# Patient Record
Sex: MALE | Race: WHITE | HISPANIC OR LATINO
[De-identification: names, ages, dates, MRNs, and addresses within clinical notes are randomized per-mention and may not be internally consistent; named-entity substitution may affect disease eponyms.]

---

## 2017-02-17 ENCOUNTER — APPOINTMENT (OUTPATIENT)
Dept: NEUROLOGY | Facility: CLINIC | Age: 82
End: 2017-02-17

## 2017-02-17 VITALS
WEIGHT: 192 LBS | SYSTOLIC BLOOD PRESSURE: 112 MMHG | TEMPERATURE: 97.6 F | HEIGHT: 66 IN | OXYGEN SATURATION: 98 % | HEART RATE: 52 BPM | BODY MASS INDEX: 30.86 KG/M2 | DIASTOLIC BLOOD PRESSURE: 76 MMHG

## 2017-04-18 ENCOUNTER — RX RENEWAL (OUTPATIENT)
Age: 82
End: 2017-04-18

## 2017-06-30 ENCOUNTER — APPOINTMENT (OUTPATIENT)
Dept: NEUROLOGY | Facility: CLINIC | Age: 82
End: 2017-06-30

## 2017-06-30 VITALS
HEIGHT: 66 IN | BODY MASS INDEX: 31.5 KG/M2 | SYSTOLIC BLOOD PRESSURE: 100 MMHG | HEART RATE: 70 BPM | DIASTOLIC BLOOD PRESSURE: 76 MMHG | WEIGHT: 196 LBS | TEMPERATURE: 97.3 F

## 2017-06-30 RX ORDER — CHOLECALCIFEROL (VITAMIN D3) 1250 MCG
1.25 MG CAPSULE ORAL
Qty: 12 | Refills: 0 | Status: ACTIVE | COMMUNITY
Start: 2016-12-29

## 2017-07-18 ENCOUNTER — RX RENEWAL (OUTPATIENT)
Age: 82
End: 2017-07-18

## 2017-09-06 ENCOUNTER — OUTPATIENT (OUTPATIENT)
Dept: OUTPATIENT SERVICES | Facility: HOSPITAL | Age: 82
LOS: 1 days | End: 2017-09-06

## 2017-09-06 ENCOUNTER — APPOINTMENT (OUTPATIENT)
Dept: MRI IMAGING | Facility: HOSPITAL | Age: 82
End: 2017-09-06

## 2017-09-06 DIAGNOSIS — M54.5 LOW BACK PAIN: ICD-10-CM

## 2017-09-08 ENCOUNTER — APPOINTMENT (OUTPATIENT)
Dept: NEUROLOGY | Facility: CLINIC | Age: 82
End: 2017-09-08
Payer: MEDICARE

## 2017-09-08 PROCEDURE — 95886 MUSC TEST DONE W/N TEST COMP: CPT

## 2017-09-08 PROCEDURE — 95911 NRV CNDJ TEST 9-10 STUDIES: CPT

## 2017-09-11 ENCOUNTER — OTHER (OUTPATIENT)
Age: 82
End: 2017-09-11

## 2017-09-16 ENCOUNTER — OUTPATIENT (OUTPATIENT)
Dept: OUTPATIENT SERVICES | Facility: HOSPITAL | Age: 82
LOS: 1 days | End: 2017-09-16
Payer: MEDICARE

## 2017-09-16 ENCOUNTER — APPOINTMENT (OUTPATIENT)
Dept: MRI IMAGING | Facility: HOSPITAL | Age: 82
End: 2017-09-16
Payer: MEDICARE

## 2017-09-16 DIAGNOSIS — M54.9 DORSALGIA, UNSPECIFIED: ICD-10-CM

## 2017-09-16 PROCEDURE — 72148 MRI LUMBAR SPINE W/O DYE: CPT | Mod: 26

## 2017-09-16 PROCEDURE — 72148 MRI LUMBAR SPINE W/O DYE: CPT

## 2017-10-25 ENCOUNTER — APPOINTMENT (OUTPATIENT)
Dept: NEUROLOGY | Facility: CLINIC | Age: 82
End: 2017-10-25
Payer: MEDICARE

## 2017-10-25 VITALS
DIASTOLIC BLOOD PRESSURE: 86 MMHG | WEIGHT: 185 LBS | TEMPERATURE: 98.6 F | HEIGHT: 66 IN | HEART RATE: 64 BPM | BODY MASS INDEX: 29.73 KG/M2 | SYSTOLIC BLOOD PRESSURE: 132 MMHG

## 2017-10-25 DIAGNOSIS — M54.5 LOW BACK PAIN: ICD-10-CM

## 2017-10-25 DIAGNOSIS — R20.2 ANESTHESIA OF SKIN: ICD-10-CM

## 2017-10-25 DIAGNOSIS — R20.0 ANESTHESIA OF SKIN: ICD-10-CM

## 2017-10-25 PROCEDURE — 99214 OFFICE O/P EST MOD 30 MIN: CPT

## 2017-10-30 ENCOUNTER — APPOINTMENT (OUTPATIENT)
Dept: NEUROLOGY | Facility: CLINIC | Age: 82
End: 2017-10-30
Payer: MEDICARE

## 2017-10-30 PROCEDURE — 95912 NRV CNDJ TEST 11-12 STUDIES: CPT

## 2017-10-30 PROCEDURE — 95885 MUSC TST DONE W/NERV TST LIM: CPT

## 2018-01-09 ENCOUNTER — LABORATORY RESULT (OUTPATIENT)
Age: 83
End: 2018-01-09

## 2018-01-09 ENCOUNTER — APPOINTMENT (OUTPATIENT)
Dept: INTERNAL MEDICINE | Facility: CLINIC | Age: 83
End: 2018-01-09
Payer: MEDICARE

## 2018-01-09 VITALS
SYSTOLIC BLOOD PRESSURE: 130 MMHG | WEIGHT: 191 LBS | HEART RATE: 59 BPM | OXYGEN SATURATION: 98 % | TEMPERATURE: 97.4 F | RESPIRATION RATE: 16 BRPM | BODY MASS INDEX: 30.7 KG/M2 | DIASTOLIC BLOOD PRESSURE: 80 MMHG | HEIGHT: 66 IN

## 2018-01-09 PROCEDURE — 90686 IIV4 VACC NO PRSV 0.5 ML IM: CPT

## 2018-01-09 PROCEDURE — 90670 PCV13 VACCINE IM: CPT

## 2018-01-09 PROCEDURE — G0009: CPT

## 2018-01-09 PROCEDURE — 99213 OFFICE O/P EST LOW 20 MIN: CPT | Mod: 25

## 2018-01-09 PROCEDURE — G0008: CPT

## 2018-01-10 LAB
25(OH)D3 SERPL-MCNC: 48.6 NG/ML
ALBUMIN SERPL ELPH-MCNC: 4.3 G/DL
ALP BLD-CCNC: 75 U/L
ALT SERPL-CCNC: 8 U/L
ANION GAP SERPL CALC-SCNC: 17 MMOL/L
APPEARANCE: CLEAR
AST SERPL-CCNC: 15 U/L
BASOPHILS # BLD AUTO: 0.08 K/UL
BASOPHILS NFR BLD AUTO: 0.9 %
BILIRUB SERPL-MCNC: 0.6 MG/DL
BILIRUBIN URINE: NEGATIVE
BLOOD URINE: ABNORMAL
BUN SERPL-MCNC: 25 MG/DL
CALCIUM SERPL-MCNC: 10.2 MG/DL
CHLORIDE SERPL-SCNC: 99 MMOL/L
CHOLEST SERPL-MCNC: 210 MG/DL
CHOLEST/HDLC SERPL: 3 RATIO
CO2 SERPL-SCNC: 22 MMOL/L
COLOR: YELLOW
CREAT SERPL-MCNC: 1.02 MG/DL
EOSINOPHIL # BLD AUTO: 0.26 K/UL
EOSINOPHIL NFR BLD AUTO: 2.8 %
GLUCOSE QUALITATIVE U: NEGATIVE MG/DL
GLUCOSE SERPL-MCNC: 101 MG/DL
HBA1C MFR BLD HPLC: 6 %
HCT VFR BLD CALC: 42.8 %
HDLC SERPL-MCNC: 70 MG/DL
HGB BLD-MCNC: 13.7 G/DL
IMM GRANULOCYTES NFR BLD AUTO: 0.2 %
KETONES URINE: NEGATIVE
LDLC SERPL CALC-MCNC: 123 MG/DL
LEUKOCYTE ESTERASE URINE: NEGATIVE
LYMPHOCYTES # BLD AUTO: 1.92 K/UL
LYMPHOCYTES NFR BLD AUTO: 20.5 %
MAN DIFF?: NORMAL
MCHC RBC-ENTMCNC: 28.8 PG
MCHC RBC-ENTMCNC: 32 GM/DL
MCV RBC AUTO: 90.1 FL
MONOCYTES # BLD AUTO: 0.57 K/UL
MONOCYTES NFR BLD AUTO: 6.1 %
NEUTROPHILS # BLD AUTO: 6.53 K/UL
NEUTROPHILS NFR BLD AUTO: 69.5 %
NITRITE URINE: NEGATIVE
PH URINE: 5
PLATELET # BLD AUTO: 293 K/UL
POTASSIUM SERPL-SCNC: 4.6 MMOL/L
PROT SERPL-MCNC: 7.3 G/DL
PROTEIN URINE: NEGATIVE MG/DL
RBC # BLD: 4.75 M/UL
RBC # FLD: 13.5 %
SODIUM SERPL-SCNC: 138 MMOL/L
SPECIFIC GRAVITY URINE: 1.02
TRIGL SERPL-MCNC: 85 MG/DL
TSH SERPL-ACNC: 3.57 UIU/ML
UROBILINOGEN URINE: NEGATIVE MG/DL
WBC # FLD AUTO: 9.38 K/UL

## 2018-01-23 ENCOUNTER — APPOINTMENT (OUTPATIENT)
Dept: NEUROLOGY | Facility: CLINIC | Age: 83
End: 2018-01-23
Payer: MEDICARE

## 2018-01-23 VITALS
WEIGHT: 192 LBS | HEART RATE: 68 BPM | TEMPERATURE: 97.4 F | DIASTOLIC BLOOD PRESSURE: 68 MMHG | SYSTOLIC BLOOD PRESSURE: 100 MMHG | HEIGHT: 66 IN | BODY MASS INDEX: 30.86 KG/M2 | OXYGEN SATURATION: 97 %

## 2018-01-23 PROCEDURE — 99213 OFFICE O/P EST LOW 20 MIN: CPT

## 2018-02-06 ENCOUNTER — APPOINTMENT (OUTPATIENT)
Dept: UROLOGY | Facility: CLINIC | Age: 83
End: 2018-02-06
Payer: MEDICARE

## 2018-02-06 VITALS
WEIGHT: 190 LBS | HEART RATE: 71 BPM | HEIGHT: 66 IN | BODY MASS INDEX: 30.53 KG/M2 | SYSTOLIC BLOOD PRESSURE: 140 MMHG | TEMPERATURE: 97.9 F | DIASTOLIC BLOOD PRESSURE: 80 MMHG | RESPIRATION RATE: 16 BRPM

## 2018-02-06 PROCEDURE — 99204 OFFICE O/P NEW MOD 45 MIN: CPT

## 2018-02-26 ENCOUNTER — RX RENEWAL (OUTPATIENT)
Age: 83
End: 2018-02-26

## 2018-03-06 ENCOUNTER — APPOINTMENT (OUTPATIENT)
Dept: UROLOGY | Facility: CLINIC | Age: 83
End: 2018-03-06
Payer: MEDICARE

## 2018-03-06 DIAGNOSIS — G47.00 INSOMNIA, UNSPECIFIED: ICD-10-CM

## 2018-03-06 DIAGNOSIS — M19.90 UNSPECIFIED OSTEOARTHRITIS, UNSPECIFIED SITE: ICD-10-CM

## 2018-03-06 PROCEDURE — 99213 OFFICE O/P EST LOW 20 MIN: CPT

## 2018-04-17 ENCOUNTER — APPOINTMENT (OUTPATIENT)
Dept: UROLOGY | Facility: CLINIC | Age: 83
End: 2018-04-17
Payer: MEDICARE

## 2018-04-17 VITALS
HEART RATE: 66 BPM | OXYGEN SATURATION: 98 % | SYSTOLIC BLOOD PRESSURE: 110 MMHG | BODY MASS INDEX: 30.22 KG/M2 | DIASTOLIC BLOOD PRESSURE: 80 MMHG | WEIGHT: 188 LBS | TEMPERATURE: 98.2 F | HEIGHT: 66 IN

## 2018-04-17 DIAGNOSIS — R31.29 OTHER MICROSCOPIC HEMATURIA: ICD-10-CM

## 2018-04-17 DIAGNOSIS — R35.1 NOCTURIA: ICD-10-CM

## 2018-04-17 PROCEDURE — 99213 OFFICE O/P EST LOW 20 MIN: CPT

## 2018-04-17 PROCEDURE — 51741 ELECTRO-UROFLOWMETRY FIRST: CPT

## 2018-07-18 ENCOUNTER — APPOINTMENT (OUTPATIENT)
Dept: NEUROLOGY | Facility: CLINIC | Age: 83
End: 2018-07-18
Payer: MEDICARE

## 2018-07-18 VITALS
TEMPERATURE: 97.3 F | HEIGHT: 66 IN | SYSTOLIC BLOOD PRESSURE: 116 MMHG | BODY MASS INDEX: 30.22 KG/M2 | WEIGHT: 188 LBS | HEART RATE: 56 BPM | OXYGEN SATURATION: 98 % | DIASTOLIC BLOOD PRESSURE: 66 MMHG

## 2018-07-18 DIAGNOSIS — G20 PARKINSON'S DISEASE: ICD-10-CM

## 2018-07-18 DIAGNOSIS — K59.00 CONSTIPATION, UNSPECIFIED: ICD-10-CM

## 2018-07-18 DIAGNOSIS — M25.562 PAIN IN LEFT KNEE: ICD-10-CM

## 2018-07-18 DIAGNOSIS — M54.9 DORSALGIA, UNSPECIFIED: ICD-10-CM

## 2018-07-18 DIAGNOSIS — G89.29 PAIN IN LEFT KNEE: ICD-10-CM

## 2018-07-18 PROCEDURE — 99213 OFFICE O/P EST LOW 20 MIN: CPT

## 2018-08-30 ENCOUNTER — RX RENEWAL (OUTPATIENT)
Age: 83
End: 2018-08-30

## 2018-11-13 ENCOUNTER — APPOINTMENT (OUTPATIENT)
Dept: INTERNAL MEDICINE | Facility: CLINIC | Age: 83
End: 2018-11-13
Payer: MEDICARE

## 2018-11-13 ENCOUNTER — LABORATORY RESULT (OUTPATIENT)
Age: 83
End: 2018-11-13

## 2018-11-13 ENCOUNTER — APPOINTMENT (OUTPATIENT)
Dept: UROLOGY | Facility: CLINIC | Age: 83
End: 2018-11-13
Payer: MEDICARE

## 2018-11-13 VITALS
HEIGHT: 66 IN | DIASTOLIC BLOOD PRESSURE: 80 MMHG | WEIGHT: 185 LBS | SYSTOLIC BLOOD PRESSURE: 128 MMHG | OXYGEN SATURATION: 98 % | HEART RATE: 57 BPM | BODY MASS INDEX: 29.73 KG/M2

## 2018-11-13 VITALS
DIASTOLIC BLOOD PRESSURE: 90 MMHG | WEIGHT: 185 LBS | OXYGEN SATURATION: 98 % | SYSTOLIC BLOOD PRESSURE: 156 MMHG | BODY MASS INDEX: 29.73 KG/M2 | HEART RATE: 56 BPM | TEMPERATURE: 97.2 F | HEIGHT: 66 IN

## 2018-11-13 DIAGNOSIS — E78.5 HYPERLIPIDEMIA, UNSPECIFIED: ICD-10-CM

## 2018-11-13 DIAGNOSIS — N13.8 BENIGN PROSTATIC HYPERPLASIA WITH LOWER URINARY TRACT SYMPMS: ICD-10-CM

## 2018-11-13 DIAGNOSIS — R79.89 OTHER SPECIFIED ABNORMAL FINDINGS OF BLOOD CHEMISTRY: ICD-10-CM

## 2018-11-13 DIAGNOSIS — I10 ESSENTIAL (PRIMARY) HYPERTENSION: ICD-10-CM

## 2018-11-13 DIAGNOSIS — G47.00 INSOMNIA, UNSPECIFIED: ICD-10-CM

## 2018-11-13 DIAGNOSIS — N40.1 BENIGN PROSTATIC HYPERPLASIA WITH LOWER URINARY TRACT SYMPMS: ICD-10-CM

## 2018-11-13 DIAGNOSIS — E03.9 HYPOTHYROIDISM, UNSPECIFIED: ICD-10-CM

## 2018-11-13 DIAGNOSIS — R73.02 IMPAIRED GLUCOSE TOLERANCE (ORAL): ICD-10-CM

## 2018-11-13 DIAGNOSIS — Z00.00 ENCOUNTER FOR GENERAL ADULT MEDICAL EXAMINATION W/OUT ABNORMAL FINDINGS: ICD-10-CM

## 2018-11-13 DIAGNOSIS — R35.0 FREQUENCY OF MICTURITION: ICD-10-CM

## 2018-11-13 DIAGNOSIS — G25.9 EXTRAPYRAMIDAL AND MOVEMENT DISORDER, UNSPECIFIED: ICD-10-CM

## 2018-11-13 PROCEDURE — 99213 OFFICE O/P EST LOW 20 MIN: CPT

## 2018-11-13 RX ORDER — TAMSULOSIN HYDROCHLORIDE 0.4 MG/1
0.4 CAPSULE ORAL
Qty: 30 | Refills: 5 | Status: ACTIVE | OUTPATIENT
Start: 2018-02-06

## 2018-11-13 NOTE — ASSESSMENT
[FreeTextEntry1] : 83-year-old patient with well-controlled hypertension, mild hyperlipidemia controlled on statin, mild hypothyroidism, on adequate replacement therapy,  Parkinson disease, partial response to carbidopa levodopa.\par Physical exam is remarkable for obesity. Labs taken.\par Patient had colonoscopy 2 years ago, negative.\par Will follow up.

## 2018-11-13 NOTE — REVIEW OF SYSTEMS
[Constipation] : constipation [Nocturia] : nocturia [see HPI] : see HPI [Difficulty Walking] : difficulty walking [Sleep Disturbances] : sleep disturbances [Negative] : Heme/Lymph [Headache] : no headache [Confusion] : no confusion [Unsteady Walk] : ataxia [Memory Loss] : no memory loss [de-identified] : rigidity [Confused] : no confusion [Convulsions] : no convulsions [Dizziness] : no dizziness [Fainting] : no fainting [Limb Weakness] : no limb weakness [Suicidal] : not suicidal [Anxiety] : no anxiety [Depression] : no depression [Change In Personality] : no personality change [Emotional Problems] : no emotional problems

## 2018-11-13 NOTE — COUNSELING
[Healthy eating counseling provided] : healthy eating [Activity counseling provided] : activity [Low Salt Diet] : Low salt diet [___ min/wk activity recommended] : [unfilled] min/wk activity recommended [Walking] : Walking [None] : None [Good understanding] : Patient has a good understanding of lifestyle changes and the steps needed to achieve self management goals [Weight management counseling provided] : Weight management [Low Fat Diet] : Low fat diet

## 2018-11-13 NOTE — HISTORY OF PRESENT ILLNESS
[Spouse] : spouse [FreeTextEntry1] : f/u [de-identified] : 83-year-old patient for routine exam. Presents  no systemic, no cardiovascular symptoms.history of well-controlled hypertension, hypothyroidism, hyperlipidemia, glucose intolerance,  obesity. No evidence of significant CAD.\par On levodopa carbidopa for mild Parkinson disease with partial therapeutic effect.

## 2018-11-13 NOTE — PHYSICAL EXAM
[General Appearance - Alert] : alert [General Appearance - In No Acute Distress] : in no acute distress [Sclera] : the sclera and conjunctiva were normal [PERRL With Normal Accommodation] : pupils were equal in size, round, and reactive to light [Extraocular Movements] : extraocular movements were intact [Outer Ear] : the ears and nose were normal in appearance [Oropharynx] : the oropharynx was normal [Neck Appearance] : the appearance of the neck was normal [Neck Cervical Mass (___cm)] : no neck mass was observed [Jugular Venous Distention Increased] : there was no jugular-venous distention [Thyroid Diffuse Enlargement] : the thyroid was not enlarged [Thyroid Nodule] : there were no palpable thyroid nodules [Auscultation Breath Sounds / Voice Sounds] : lungs were clear to auscultation bilaterally [Heart Sounds] : normal S1 and S2 [Full Pulse] : the pedal pulses are present [Edema] : there was no peripheral edema [Bowel Sounds] : normal bowel sounds [Abdomen Soft] : soft [Abdomen Tenderness] : non-tender [Abdomen Mass (___ Cm)] : no abdominal mass palpated [Patient Refused] : rectal exam was refused by the patient [Cervical Lymph Nodes Enlarged Posterior Bilaterally] : posterior cervical [Cervical Lymph Nodes Enlarged Anterior Bilaterally] : anterior cervical [Supraclavicular Lymph Nodes Enlarged Bilaterally] : supraclavicular [Axillary Lymph Nodes Enlarged Bilaterally] : axillary [Femoral Lymph Nodes Enlarged Bilaterally] : femoral [Inguinal Lymph Nodes Enlarged Bilaterally] : inguinal [No CVA Tenderness] : no ~M costovertebral angle tenderness [No Spinal Tenderness] : no spinal tenderness [Abnormal Walk] : normal gait [Nail Clubbing] : no clubbing  or cyanosis of the fingernails [Musculoskeletal - Swelling] : no joint swelling seen [Motor Tone] : muscle strength and tone were normal [Skin Color & Pigmentation] : normal skin color and pigmentation [Skin Turgor] : normal skin turgor [] : no rash [Skin Lesions] : no skin lesions [No Focal Deficits] : no focal deficits [Oriented To Time, Place, And Person] : oriented to person, place, and time [Impaired Insight] : insight and judgment were intact [Affect] : the affect was normal [FreeTextEntry1] : slow motion,amimia [Over the Past 2 Weeks, Have You Felt Down, Depressed, or Hopeless?] : 1.) Over the past 2 weeks, have you felt down, depressed, or hopeless? No [Over the Past 2 Weeks, Have You Felt Little Interest or Pleasure Doing Things?] : 2.) Over the past 2 weeks, have you felt little interest or pleasure doing things? No

## 2018-11-14 PROBLEM — R79.89 ABNORMAL CBC: Status: ACTIVE | Noted: 2018-11-14

## 2018-11-14 LAB
25(OH)D3 SERPL-MCNC: 32.7 NG/ML
ALBUMIN SERPL ELPH-MCNC: 4.2 G/DL
ALP BLD-CCNC: 119 U/L
ALT SERPL-CCNC: 9 U/L
ANION GAP SERPL CALC-SCNC: 14 MMOL/L
APPEARANCE: CLEAR
AST SERPL-CCNC: 16 U/L
BASOPHILS # BLD AUTO: 0.14 K/UL
BASOPHILS NFR BLD AUTO: 1.2 %
BILIRUB SERPL-MCNC: 0.5 MG/DL
BILIRUBIN URINE: NEGATIVE
BLOOD URINE: ABNORMAL
BUN SERPL-MCNC: 24 MG/DL
CALCIUM SERPL-MCNC: 10.1 MG/DL
CHLORIDE SERPL-SCNC: 101 MMOL/L
CHOLEST SERPL-MCNC: 168 MG/DL
CHOLEST/HDLC SERPL: 2.4 RATIO
CO2 SERPL-SCNC: 23 MMOL/L
COLOR: YELLOW
CREAT SERPL-MCNC: 0.9 MG/DL
EOSINOPHIL # BLD AUTO: 0.3 K/UL
EOSINOPHIL NFR BLD AUTO: 2.6 %
GLUCOSE QUALITATIVE U: NEGATIVE MG/DL
GLUCOSE SERPL-MCNC: 98 MG/DL
HBA1C MFR BLD HPLC: 6.3 %
HCT VFR BLD CALC: 39.7 %
HDLC SERPL-MCNC: 69 MG/DL
HGB BLD-MCNC: 12.5 G/DL
IMM GRANULOCYTES NFR BLD AUTO: 0.3 %
KETONES URINE: NEGATIVE
LDLC SERPL CALC-MCNC: 85 MG/DL
LEUKOCYTE ESTERASE URINE: NEGATIVE
LYMPHOCYTES # BLD AUTO: 2.48 K/UL
LYMPHOCYTES NFR BLD AUTO: 21.3 %
MAN DIFF?: NORMAL
MCHC RBC-ENTMCNC: 26.4 PG
MCHC RBC-ENTMCNC: 31.5 GM/DL
MCV RBC AUTO: 83.8 FL
MONOCYTES # BLD AUTO: 0.86 K/UL
MONOCYTES NFR BLD AUTO: 7.4 %
NEUTROPHILS # BLD AUTO: 7.81 K/UL
NEUTROPHILS NFR BLD AUTO: 67.2 %
NITRITE URINE: NEGATIVE
PH URINE: 5.5
PLATELET # BLD AUTO: 413 K/UL
POTASSIUM SERPL-SCNC: 4.4 MMOL/L
PROT SERPL-MCNC: 8.1 G/DL
PROTEIN URINE: NEGATIVE MG/DL
RBC # BLD: 4.74 M/UL
RBC # FLD: 14.4 %
SODIUM SERPL-SCNC: 138 MMOL/L
SPECIFIC GRAVITY URINE: 1.02
TRIGL SERPL-MCNC: 72 MG/DL
TSH SERPL-ACNC: 2.49 UIU/ML
UROBILINOGEN URINE: NEGATIVE MG/DL
WBC # FLD AUTO: 11.63 K/UL

## 2018-12-03 ENCOUNTER — MEDICATION RENEWAL (OUTPATIENT)
Age: 83
End: 2018-12-03

## 2019-01-09 ENCOUNTER — OTHER (OUTPATIENT)
Age: 84
End: 2019-01-09

## 2019-03-06 ENCOUNTER — INPATIENT (INPATIENT)
Facility: HOSPITAL | Age: 84
LOS: 12 days | Discharge: NURSING, REHAB OR LONG-TERM CARE | DRG: 374 | End: 2019-03-19
Attending: INTERNAL MEDICINE | Admitting: INTERNAL MEDICINE
Payer: MEDICARE

## 2019-03-06 VITALS
DIASTOLIC BLOOD PRESSURE: 60 MMHG | WEIGHT: 186.07 LBS | HEART RATE: 66 BPM | SYSTOLIC BLOOD PRESSURE: 88 MMHG | RESPIRATION RATE: 17 BRPM

## 2019-03-06 DIAGNOSIS — Z98.890 OTHER SPECIFIED POSTPROCEDURAL STATES: Chronic | ICD-10-CM

## 2019-03-06 DIAGNOSIS — E03.9 HYPOTHYROIDISM, UNSPECIFIED: ICD-10-CM

## 2019-03-06 DIAGNOSIS — Z29.9 ENCOUNTER FOR PROPHYLACTIC MEASURES, UNSPECIFIED: ICD-10-CM

## 2019-03-06 DIAGNOSIS — L89.152 PRESSURE ULCER OF SACRAL REGION, STAGE 2: ICD-10-CM

## 2019-03-06 DIAGNOSIS — I50.9 HEART FAILURE, UNSPECIFIED: ICD-10-CM

## 2019-03-06 DIAGNOSIS — G20 PARKINSON'S DISEASE: ICD-10-CM

## 2019-03-06 DIAGNOSIS — E78.5 HYPERLIPIDEMIA, UNSPECIFIED: ICD-10-CM

## 2019-03-06 DIAGNOSIS — N17.9 ACUTE KIDNEY FAILURE, UNSPECIFIED: ICD-10-CM

## 2019-03-06 DIAGNOSIS — T68.XXXA HYPOTHERMIA, INITIAL ENCOUNTER: ICD-10-CM

## 2019-03-06 DIAGNOSIS — I10 ESSENTIAL (PRIMARY) HYPERTENSION: ICD-10-CM

## 2019-03-06 LAB
ALBUMIN SERPL ELPH-MCNC: 2.1 G/DL — LOW (ref 3.5–5)
ALP SERPL-CCNC: 277 U/L — HIGH (ref 40–120)
ALT FLD-CCNC: 47 U/L DA — SIGNIFICANT CHANGE UP (ref 10–60)
ANION GAP SERPL CALC-SCNC: 9 MMOL/L — SIGNIFICANT CHANGE UP (ref 5–17)
APPEARANCE UR: CLEAR — SIGNIFICANT CHANGE UP
AST SERPL-CCNC: 49 U/L — HIGH (ref 10–40)
BASE EXCESS BLDV CALC-SCNC: -2.3 MMOL/L — LOW (ref -2–2)
BASOPHILS # BLD AUTO: 0.02 K/UL — SIGNIFICANT CHANGE UP (ref 0–0.2)
BASOPHILS NFR BLD AUTO: 0.2 % — SIGNIFICANT CHANGE UP (ref 0–2)
BILIRUB SERPL-MCNC: 1.2 MG/DL — SIGNIFICANT CHANGE UP (ref 0.2–1.2)
BILIRUB UR-MCNC: NEGATIVE — SIGNIFICANT CHANGE UP
BLOOD GAS COMMENTS, VENOUS: SIGNIFICANT CHANGE UP
BUN SERPL-MCNC: 34 MG/DL — HIGH (ref 7–18)
CALCIUM SERPL-MCNC: 7.7 MG/DL — LOW (ref 8.4–10.5)
CHLORIDE SERPL-SCNC: 107 MMOL/L — SIGNIFICANT CHANGE UP (ref 96–108)
CO2 SERPL-SCNC: 22 MMOL/L — SIGNIFICANT CHANGE UP (ref 22–31)
COLOR SPEC: YELLOW — SIGNIFICANT CHANGE UP
CREAT SERPL-MCNC: 1.18 MG/DL — SIGNIFICANT CHANGE UP (ref 0.5–1.3)
DIFF PNL FLD: ABNORMAL
EOSINOPHIL # BLD AUTO: 0 K/UL — SIGNIFICANT CHANGE UP (ref 0–0.5)
EOSINOPHIL NFR BLD AUTO: 0 % — SIGNIFICANT CHANGE UP (ref 0–6)
GLUCOSE SERPL-MCNC: 151 MG/DL — HIGH (ref 70–99)
GLUCOSE UR QL: 250
HCO3 BLDV-SCNC: 22 MMOL/L — SIGNIFICANT CHANGE UP (ref 21–29)
HCT VFR BLD CALC: 30.9 % — LOW (ref 39–50)
HGB BLD-MCNC: 9.5 G/DL — LOW (ref 13–17)
HOROWITZ INDEX BLDV+IHG-RTO: 21 — SIGNIFICANT CHANGE UP
IMM GRANULOCYTES NFR BLD AUTO: 0.7 % — SIGNIFICANT CHANGE UP (ref 0–1.5)
KETONES UR-MCNC: ABNORMAL
LACTATE SERPL-SCNC: 1.4 MMOL/L — SIGNIFICANT CHANGE UP (ref 0.7–2)
LEUKOCYTE ESTERASE UR-ACNC: NEGATIVE — SIGNIFICANT CHANGE UP
LIDOCAIN IGE QN: 45 U/L — LOW (ref 73–393)
LYMPHOCYTES # BLD AUTO: 0.53 K/UL — LOW (ref 1–3.3)
LYMPHOCYTES # BLD AUTO: 4.5 % — LOW (ref 13–44)
MCHC RBC-ENTMCNC: 24.5 PG — LOW (ref 27–34)
MCHC RBC-ENTMCNC: 30.7 GM/DL — LOW (ref 32–36)
MCV RBC AUTO: 79.6 FL — LOW (ref 80–100)
MONOCYTES # BLD AUTO: 1.15 K/UL — HIGH (ref 0–0.9)
MONOCYTES NFR BLD AUTO: 9.7 % — SIGNIFICANT CHANGE UP (ref 2–14)
NEUTROPHILS # BLD AUTO: 10.06 K/UL — HIGH (ref 1.8–7.4)
NEUTROPHILS NFR BLD AUTO: 84.9 % — HIGH (ref 43–77)
NITRITE UR-MCNC: NEGATIVE — SIGNIFICANT CHANGE UP
NRBC # BLD: 0 /100 WBCS — SIGNIFICANT CHANGE UP (ref 0–0)
NT-PROBNP SERPL-SCNC: 3051 PG/ML — HIGH (ref 0–450)
OB PNL STL: NEGATIVE — SIGNIFICANT CHANGE UP
PCO2 BLDV: 38 MMHG — SIGNIFICANT CHANGE UP (ref 35–50)
PH BLDV: 7.38 — SIGNIFICANT CHANGE UP (ref 7.35–7.45)
PH UR: 5 — SIGNIFICANT CHANGE UP (ref 5–8)
PLATELET # BLD AUTO: 193 K/UL — SIGNIFICANT CHANGE UP (ref 150–400)
PO2 BLDV: 26 MMHG — SIGNIFICANT CHANGE UP (ref 25–45)
POTASSIUM SERPL-MCNC: 3.5 MMOL/L — SIGNIFICANT CHANGE UP (ref 3.5–5.3)
POTASSIUM SERPL-SCNC: 3.5 MMOL/L — SIGNIFICANT CHANGE UP (ref 3.5–5.3)
PROT SERPL-MCNC: 6.3 G/DL — SIGNIFICANT CHANGE UP (ref 6–8.3)
PROT UR-MCNC: 30 MG/DL
RBC # BLD: 3.88 M/UL — LOW (ref 4.2–5.8)
RBC # FLD: 15.5 % — HIGH (ref 10.3–14.5)
SAO2 % BLDV: 36 % — LOW (ref 67–88)
SODIUM SERPL-SCNC: 138 MMOL/L — SIGNIFICANT CHANGE UP (ref 135–145)
SP GR SPEC: 1.01 — SIGNIFICANT CHANGE UP (ref 1.01–1.02)
TROPONIN I SERPL-MCNC: 0.04 NG/ML — SIGNIFICANT CHANGE UP (ref 0–0.04)
TSH SERPL-MCNC: 2.38 UU/ML — SIGNIFICANT CHANGE UP (ref 0.34–4.82)
UROBILINOGEN FLD QL: NEGATIVE — SIGNIFICANT CHANGE UP
WBC # BLD: 11.84 K/UL — HIGH (ref 3.8–10.5)
WBC # FLD AUTO: 11.84 K/UL — HIGH (ref 3.8–10.5)

## 2019-03-06 PROCEDURE — 74177 CT ABD & PELVIS W/CONTRAST: CPT | Mod: 26

## 2019-03-06 PROCEDURE — 70450 CT HEAD/BRAIN W/O DYE: CPT | Mod: 26

## 2019-03-06 PROCEDURE — 93010 ELECTROCARDIOGRAM REPORT: CPT

## 2019-03-06 PROCEDURE — 99285 EMERGENCY DEPT VISIT HI MDM: CPT

## 2019-03-06 PROCEDURE — 71045 X-RAY EXAM CHEST 1 VIEW: CPT | Mod: 26

## 2019-03-06 PROCEDURE — 99223 1ST HOSP IP/OBS HIGH 75: CPT | Mod: GC

## 2019-03-06 RX ORDER — SODIUM CHLORIDE 9 MG/ML
1000 INJECTION, SOLUTION INTRAVENOUS
Qty: 0 | Refills: 0 | Status: DISCONTINUED | OUTPATIENT
Start: 2019-03-06 | End: 2019-03-12

## 2019-03-06 RX ORDER — SODIUM CHLORIDE 9 MG/ML
2000 INJECTION INTRAMUSCULAR; INTRAVENOUS; SUBCUTANEOUS ONCE
Qty: 0 | Refills: 0 | Status: COMPLETED | OUTPATIENT
Start: 2019-03-06 | End: 2019-03-06

## 2019-03-06 RX ORDER — ERYTHROMYCIN BASE 5 MG/GRAM
1 OINTMENT (GRAM) OPHTHALMIC (EYE)
Qty: 0 | Refills: 0 | Status: DISCONTINUED | OUTPATIENT
Start: 2019-03-06 | End: 2019-03-19

## 2019-03-06 RX ORDER — TAMSULOSIN HYDROCHLORIDE 0.4 MG/1
1 CAPSULE ORAL
Qty: 0 | Refills: 0 | COMMUNITY

## 2019-03-06 RX ORDER — CARBIDOPA AND LEVODOPA 25; 100 MG/1; MG/1
1 TABLET ORAL
Qty: 0 | Refills: 0 | Status: DISCONTINUED | OUTPATIENT
Start: 2019-03-06 | End: 2019-03-19

## 2019-03-06 RX ORDER — SODIUM CHLORIDE 9 MG/ML
1000 INJECTION INTRAMUSCULAR; INTRAVENOUS; SUBCUTANEOUS
Qty: 0 | Refills: 0 | Status: DISCONTINUED | OUTPATIENT
Start: 2019-03-06 | End: 2019-03-06

## 2019-03-06 RX ORDER — CARBIDOPA AND LEVODOPA 25; 100 MG/1; MG/1
1 TABLET ORAL
Qty: 0 | Refills: 0 | COMMUNITY

## 2019-03-06 RX ORDER — PIPERACILLIN AND TAZOBACTAM 4; .5 G/20ML; G/20ML
3.38 INJECTION, POWDER, LYOPHILIZED, FOR SOLUTION INTRAVENOUS EVERY 8 HOURS
Qty: 0 | Refills: 0 | Status: DISCONTINUED | OUTPATIENT
Start: 2019-03-06 | End: 2019-03-06

## 2019-03-06 RX ORDER — LEVOTHYROXINE SODIUM 125 MCG
1 TABLET ORAL
Qty: 0 | Refills: 0 | COMMUNITY

## 2019-03-06 RX ORDER — PIPERACILLIN AND TAZOBACTAM 4; .5 G/20ML; G/20ML
3.38 INJECTION, POWDER, LYOPHILIZED, FOR SOLUTION INTRAVENOUS ONCE
Qty: 0 | Refills: 0 | Status: COMPLETED | OUTPATIENT
Start: 2019-03-06 | End: 2019-03-06

## 2019-03-06 RX ORDER — LEVOTHYROXINE SODIUM 125 MCG
50 TABLET ORAL ONCE
Qty: 0 | Refills: 0 | Status: COMPLETED | OUTPATIENT
Start: 2019-03-06 | End: 2019-03-06

## 2019-03-06 RX ORDER — TRAMADOL HYDROCHLORIDE 50 MG/1
25 TABLET ORAL EVERY 8 HOURS
Qty: 0 | Refills: 0 | Status: DISCONTINUED | OUTPATIENT
Start: 2019-03-06 | End: 2019-03-06

## 2019-03-06 RX ORDER — HYDROCORTISONE 20 MG
50 TABLET ORAL ONCE
Qty: 0 | Refills: 0 | Status: COMPLETED | OUTPATIENT
Start: 2019-03-06 | End: 2019-03-06

## 2019-03-06 RX ORDER — TAMSULOSIN HYDROCHLORIDE 0.4 MG/1
0.4 CAPSULE ORAL AT BEDTIME
Qty: 0 | Refills: 0 | Status: DISCONTINUED | OUTPATIENT
Start: 2019-03-06 | End: 2019-03-19

## 2019-03-06 RX ORDER — ASPIRIN/CALCIUM CARB/MAGNESIUM 324 MG
81 TABLET ORAL DAILY
Qty: 0 | Refills: 0 | Status: DISCONTINUED | OUTPATIENT
Start: 2019-03-06 | End: 2019-03-07

## 2019-03-06 RX ORDER — ASPIRIN/CALCIUM CARB/MAGNESIUM 324 MG
1 TABLET ORAL
Qty: 0 | Refills: 0 | COMMUNITY

## 2019-03-06 RX ORDER — HEPARIN SODIUM 5000 [USP'U]/ML
5000 INJECTION INTRAVENOUS; SUBCUTANEOUS EVERY 8 HOURS
Qty: 0 | Refills: 0 | Status: DISCONTINUED | OUTPATIENT
Start: 2019-03-06 | End: 2019-03-07

## 2019-03-06 RX ORDER — LEVOTHYROXINE SODIUM 125 MCG
100 TABLET ORAL DAILY
Qty: 0 | Refills: 0 | Status: DISCONTINUED | OUTPATIENT
Start: 2019-03-07 | End: 2019-03-19

## 2019-03-06 RX ADMIN — Medication 1 APPLICATION(S): at 22:33

## 2019-03-06 RX ADMIN — PIPERACILLIN AND TAZOBACTAM 200 GRAM(S): 4; .5 INJECTION, POWDER, LYOPHILIZED, FOR SOLUTION INTRAVENOUS at 18:31

## 2019-03-06 RX ADMIN — CARBIDOPA AND LEVODOPA 1 TABLET(S): 25; 100 TABLET ORAL at 22:33

## 2019-03-06 RX ADMIN — TAMSULOSIN HYDROCHLORIDE 0.4 MILLIGRAM(S): 0.4 CAPSULE ORAL at 22:33

## 2019-03-06 RX ADMIN — Medication 50 MILLIGRAM(S): at 19:37

## 2019-03-06 RX ADMIN — SODIUM CHLORIDE 75 MILLILITER(S): 9 INJECTION, SOLUTION INTRAVENOUS at 18:48

## 2019-03-06 RX ADMIN — Medication 50 MICROGRAM(S): at 18:29

## 2019-03-06 RX ADMIN — SODIUM CHLORIDE 8000 MILLILITER(S): 9 INJECTION INTRAMUSCULAR; INTRAVENOUS; SUBCUTANEOUS at 14:00

## 2019-03-06 RX ADMIN — Medication 1 DROP(S): at 22:32

## 2019-03-06 RX ADMIN — HEPARIN SODIUM 5000 UNIT(S): 5000 INJECTION INTRAVENOUS; SUBCUTANEOUS at 22:33

## 2019-03-06 RX ADMIN — SODIUM CHLORIDE 2000 MILLILITER(S): 9 INJECTION INTRAMUSCULAR; INTRAVENOUS; SUBCUTANEOUS at 14:35

## 2019-03-06 NOTE — H&P ADULT - PROBLEM SELECTOR PLAN 2
c/w synthroid 100mcg daily PO, if patient unable to tolerate PO, c/w synthroid IV 50mcg daily until able to tolerate  TSH within normal limits

## 2019-03-06 NOTE — H&P ADULT - PROBLEM SELECTOR PLAN 3
BUN 34 and creatinine 1.18 on admission  likely secondary to poor oral intake in the past 2 days  c/w D5NS for now  f/u BMP in AM

## 2019-03-06 NOTE — H&P ADULT - NSHPPHYSICALEXAM_GEN_ALL_CORE
Vital Signs Last 24 Hrs  T(C): 34.8 (06 Mar 2019 13:46), Max: 34.8 (06 Mar 2019 13:46)  T(F): 94.7 (06 Mar 2019 13:46), Max: 94.7 (06 Mar 2019 13:46)  HR: 66 (06 Mar 2019 16:43) (61 - 66)  BP: 98/64 (06 Mar 2019 16:43) (88/60 - 98/64)  RR: 18 (06 Mar 2019 16:43) (17 - 18)  SpO2: 100% (06 Mar 2019 16:43) (100% - 100%)  ________________________________________________  PHYSICAL EXAM:  GENERAL: NAD  HEENT: Normocephalic;  conjunctivae of right eye slightly erythematous with copious thick mucous discharge; dry mucous membranes;   NECK : supple, no JVD  CHEST/LUNG: Clear to auscultation bilaterally with good air entry   HEART: S1 S2  regular; no murmurs, gallops or rubs  ABDOMEN: Soft, Nontender, Nondistended; Bowel sounds present  EXTREMITIES: no cyanosis; no edema; no calf tenderness  NERVOUS SYSTEM:  Awake and alert; Oriented  to place, person

## 2019-03-06 NOTE — ED PROVIDER NOTE - CARE PLAN
Principal Discharge DX:	Hypothermia, initial encounter  Secondary Diagnosis:	Failure to thrive in adult  Secondary Diagnosis:	Pressure injury of sacral region, stage 2

## 2019-03-06 NOTE — H&P ADULT - PROBLEM SELECTOR PLAN 9
IMPROVE VTE Individual Risk Assessment          RISK                                                          Points  [  ] Previous VTE                                                3  [  ] Thrombophilia                                             2  [  ] Lower limb paralysis                                   2        (unable to hold up >15 seconds)    [  ] Current Cancer                                             2         (within 6 months)  [  x] Immobilization > 24 hrs                              1  [  ] ICU/CCU stay > 24 hours                             1  [ x ] Age > 60                                                         1    IMPROVE VTE Score: 2    c/w heparin for vte prophylaxis

## 2019-03-06 NOTE — H&P ADULT - ASSESSMENT
Patient admitted for fatigue, increased lethargy, suspected sepsis given hypothermia and clinical condition, s/p Iv fluids and Zosyn in ED, will obtain cortisol and treat for suspected UTI vs diarrheal illness Patient admitted for fatigue, increased lethargy, suspected sepsis given hypothermia and clinical condition, s/p Iv fluids and Zosyn in ED, will obtain cortisol and treat for suspected hypothyroid state, continue with Parkinson medication; patient also noted to have colonic mass with multiple masses in liver, suspected for liver metastasis

## 2019-03-06 NOTE — H&P ADULT - PROBLEM SELECTOR PLAN 1
may be adrenal crisis vs hypothyroid state vs sepsis  s/p 1 dose of Zosyn, IVF in ED  f/u blood cultures, urine culture  UA and CXR negative on admission  patient has not been able to take medications for the last 2 days  f/u PM cortisol level  will try 1 dose of IV hydrocortisone, IV synthroid 50mcg  TSH within normal limits - however may be acute hypothyroid state without sufficient TSH adjustment  c/w synthroid 100mcg daily  f/u GI consult - Dr. Abreu - patient may need colonoscopy for further evaluation of mass  **** PRIMARY TEAM TO CONSULT IR IN AM FOR BIOPSY OF MASS

## 2019-03-06 NOTE — ED PROVIDER NOTE - SKIN, MLM
Skin normal color for race, warm, dry. No evidence of rash. stage 2 decubitus ulcer. no surrounding redness or fluctuance

## 2019-03-06 NOTE — ED ADULT TRIAGE NOTE - WEIGHT IN LBS
Pt sitting in semi-fowlers position in bed, with  visiting at the bedside. All 
comfort and safety measures met. All personal belongings and call light within reach. Pt 
clean and dry. Pt reports pain being manageable through scheduled and PRN medications. Will 
endorse to on coming night shift. 330

## 2019-03-06 NOTE — ED ADULT NURSE REASSESSMENT NOTE - NS ED NURSE REASSESS COMMENT FT1
Pt's blood pressure improved slightly after 2 L from EMS and 2 L in the ED. Pt is lethargic but responsive to verbal stimuli. Pt speaks softly due to parkinson's affecting speech. Wife and Son at bedside.

## 2019-03-06 NOTE — ED ADULT NURSE NOTE - PMH
CHF (congestive heart failure)    HLD (hyperlipidemia)    Hypertension    Hypothyroidism    Parkinson's disease    Sacral decubitus ulcer, stage II

## 2019-03-06 NOTE — H&P ADULT - HISTORY OF PRESENT ILLNESS
83M with h/o HTN, HL, CHF with preserved EF, comes in by EMS after visiting phlebotomist called 911. Per chart, patient was noted to be very dehydrated, and very weak. Wife at bedside shares that patient has not been eating or drinking liquids for the past several days and has been having waxing/waning confusion for the past day. Patient has had decreased movement, however unable to bear weight. Patient baseline. ED physician on admission initiated goals of care however wife Initiated goals of care, but wife has not decided. Pt too sick to have discussion about goals of care at present. 83M with PMH of HTN, HLD, hypothyroidism, CHF with preserved EF, Parkinson's disease, comes in by EMS after visiting phlebotomist called 911. Per chart, patient was noted to be very dehydrated, and very weak. Wife at bedside shares that patient has not been eating or drinking liquids for the past several days and has been having waxing/waning confusion for the past day. Patient has had decreased movement, however unable to bear weight. Pt has been able to whispering, however overall much more weak, has not taken medication for the past 2 days. ED physician on admission initiated goals of care however wife Initiated goals of care, but wife has not decided. Pt too sick to have discussion about goals of care at present, will defer until patient more improved.

## 2019-03-06 NOTE — ED PROVIDER NOTE - OBJECTIVE STATEMENT
83M with h/o HTN, HL, CHF with preserved EF, comes in by ems after visiting phlebotomist called 911. Pt dehydrated and very weak. Wife at bedside shares that pt has not been eating or drinking liquids. Waxing and waning confusion. Moves all extremities but unable to bear weight. Initiated goals of care, but wife has not decided. Pt too sick to have discussion about goals of care at present.

## 2019-03-06 NOTE — ED ADULT NURSE NOTE - OBJECTIVE STATEMENT
Pt came found unresponsive on the couch. hypotensive, diaphoretic. Pt had 2 incidents of diarrhea. Pt has a hx of parkinson's, as per son pt was ambulating with a walker till a few days ago. For 3 days pt has not be able to walk. Pt has a sacral hematoma with right and left buttock excoriation.

## 2019-03-06 NOTE — H&P ADULT - NSHPLABSRESULTS_GEN_ALL_CORE
LABS:      CBC Full  -  ( 06 Mar 2019 14:24 )  WBC Count : 11.84 K/uL  Hemoglobin : 9.5 g/dL  Hematocrit : 30.9 %  Platelet Count - Automated : 193 K/uL  Mean Cell Volume : 79.6 fl  Mean Cell Hemoglobin : 24.5 pg  Mean Cell Hemoglobin Concentration : 30.7 gm/dL  Auto Neutrophil # : 10.06 K/uL  Auto Lymphocyte # : 0.53 K/uL  Auto Monocyte # : 1.15 K/uL  Auto Eosinophil # : 0.00 K/uL  Auto Basophil # : 0.02 K/uL  Auto Neutrophil % : 84.9 %  Auto Lymphocyte % : 4.5 %  Auto Monocyte % : 9.7 %  Auto Eosinophil % : 0.0 %  Auto Basophil % : 0.2 %        138  |  107  |  34<H>  ----------------------------<  151<H>  3.5   |  22  |  1.18    Ca    7.7<L>      06 Mar 2019 14:24    TPro  6.3  /  Alb  2.1<L>  /  TBili  1.2  /  DBili  x   /  AST  49<H>  /  ALT  47  /  AlkPhos  277<H>      Urinalysis Basic - ( 06 Mar 2019 16:48 )    Color: Yellow / Appearance: Clear / S.010 / pH: x  Gluc: x / Ketone: Small  / Bili: Negative / Urobili: Negative   Blood: x / Protein: 30 mg/dL / Nitrite: Negative   Leuk Esterase: Negative / RBC: x / WBC x   Sq Epi: x / Non Sq Epi: x / Bacteria: x    RADIOLOGY & ADDITIONAL STUDIES (The following images were personally reviewed):    EXAM:  CT BRAIN                        PROCEDURE DATE:  2019    INTERPRETATION:  HISTORY: Confusion  Comparison 11/01/15  Impression:  1. Unremarkable noncontrast CT scan of the brain.        EXAM:  CT ABDOMEN AND PELVIS IV contrast                        PROCEDURE DATE:  2019    INTERPRETATION:  CLINICAL STATEMENT: r/o colitis  COMPARISON: None.    FINDINGS:    The lower chest is unremarkable.    The liver is remarkable for a numerous hypodense lesions consistent with   metastatic disease. The fluid-filled gallbladder is appreciated.    The spleen, pancreas and adrenal glands are unremarkable.The kidneys are   unremarkable. The fluid-filled bladder appears intact.    There is a large heterogeneous mass in the lower abdomen measuring 2.5 cm   transverse x 9.2 cm AP x 8.8 cm in length. This mass circumferentially   envelops the sigmoid colon without intraluminal extension or obstruction.   Differential diagnosis includes lymphoma, just tumor or adenocarcinoma.    There is no intraperitoneal free air.  There is no free fluid. The aorta   is not aneurysmal.    There is no significant abdominal, retroperitoneal or pelvic   lymphadenopathy. The pelvic structures demonstrate enlarged prostate   gland.    The osseous structures demonstrate degenerative changes of the spine and   right total hip replacement.    IMPRESSION: Multiple liver lesions consistent with metastases; large mass in the lower abdomen/upper pelvis surrounding the sigmoid   colon which may be secondary to just tumor, lymphoma or adenocarcinoma.   There is no bowel obstruction.    CXR: No evidence for focal infiltrate or lobar consolidation.   Interstitial prominence is identified bilaterally, likely related to   shallow inspiration.

## 2019-03-06 NOTE — H&P ADULT - PMH
CHF (congestive heart failure)    HLD (hyperlipidemia)    Hypertension    Parkinson's disease    Sacral decubitus ulcer, stage II CHF (congestive heart failure)    HLD (hyperlipidemia)    Hypertension    Hypothyroidism    Parkinson's disease    Sacral decubitus ulcer, stage II

## 2019-03-06 NOTE — H&P ADULT - ATTENDING COMMENTS
Patient seen and examined; Agree with PGY 2 MAR A/P above with editing as needed. Discussed with DONNIE Lee    Plan:  Will give Hydrocortisone 50 mg IV x 1 dose then  Give IV synthroid 50 microgm then oral from tomorrow  IV Fluids with Dextrose  Resume Sinemet    Full note to follow Patient seen and examined; Agree with PGY 2 MAR A/P above with editing as needed. Discussed with DONNIE Lee    83M with PMH of HTN, HLD, hypothyroidism, CHF with preserved EF, Parkinson's disease, comes in by EMS after visiting phlebotomist called 911. Per chart, patient was noted to be very dehydrated, and very weak. Wife at bedside shares that patient has not been eating or drinking liquids for the past several days and has been having waxing/waning confusion for the past day. Patient was found to have a temp of 94.7 rectally on presentation. Code sepsis was called and was given Zosyn and Saline boluses. BP was low 90s systolic.     Patient is extremely weak, lethargic but arousable and oriented but hard to even speak. after review of outpatient records from PCP found patient on Synthroid. As per patient he may not have taken for 4 days and also has missed couple of days of PD medication. I was informed by Radiologist later in the evening of CT Abdomen showing multiple hepatic masses concerning for mets with a mass in abdomen.     ROS: As above  FH: Unable to obtain at this time  SH: No smoking, lives with wife and Son; occasionally use wine; ambulate with cane/walker    Vitals; Reviewed as above    Labs:                        9.5    11.84 )-----------( 193      ( 06 Mar 2019 14:24 )             30.9   03-06    138  |  107  |  34<H>  ----------------------------<  151<H>  3.5   |  22  |  1.18    Ca    7.7<L>      06 Mar 2019 14:24    TPro  6.3  /  Alb  2.1<L>  /  TBili  1.2  /  DBili  x   /  AST  49<H>  /  ALT  47  /  AlkPhos  277<H>  03-06    < from: CT Abdomen and Pelvis w/ IV Cont (03.06.19 @ 16:07) >    FINDINGS: The lower chest is unremarkable.  The liver is remarkable for a numerous hypodense lesions consistent with metastatic disease. The fluid-filled gallbladder is appreciated.  The spleen, pancreas and adrenal glands are unremarkable. The kidneys are unremarkable. The fluid-filled bladder appears intact.  There is a large heterogeneous mass in the lower abdomen measuring 2.5 cm transverse x 9.2 cm AP x 8.8 cm in length. This mass circumferentially   envelops the sigmoid colon without intraluminal extension or obstruction. Differential diagnosis includes lymphoma, just tumor or adenocarcinoma.  There is no intraperitoneal free air.  There is no free fluid. The aorta is not aneurysmal.  There is no significant abdominal, retroperitoneal or pelvic lymphadenopathy. The pelvic structures demonstrate enlarged prostate gland.  The osseous structures demonstrate degenerative changes of the spine and right total hip replacement.    IMPRESSION: Multiple liver lesions consistent with metastases    large mass in the lower abdomen/upper pelvis surrounding the sigmoid colon which may be secondary to just tumor, lymphoma or adenocarcinoma.   There is no bowel obstruction.    P/E:  Gen: Elderly male, appears severely dehydrated with dry mucosa  Neuro: Lethargic but arousable  Eyes: Right eye appears congested and erythema  CVS: S1S2 present  Resp: BLAE+, No wheeze or Rhonchi  GI: Soft. 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 Abreu  Add Ensure MH if patient able to tolerate  TSH could be just normal as it takes few weeks to affect the TSH levels. May get Free T4 in AM  Hypothermia could also be attributed to Liver mets and underlying malignancy  Need Oncology evaluation in AM    Discussed with Patient wife and Son (visiting from [place]) at bedside at length findings, likely etiology and plan of care  Patient lives with other Son who is more uptodate with details about patient medical condition

## 2019-03-07 DIAGNOSIS — E11.9 TYPE 2 DIABETES MELLITUS WITHOUT COMPLICATIONS: ICD-10-CM

## 2019-03-07 DIAGNOSIS — D64.9 ANEMIA, UNSPECIFIED: ICD-10-CM

## 2019-03-07 DIAGNOSIS — E87.6 HYPOKALEMIA: ICD-10-CM

## 2019-03-07 DIAGNOSIS — K63.9 DISEASE OF INTESTINE, UNSPECIFIED: ICD-10-CM

## 2019-03-07 DIAGNOSIS — R78.81 BACTEREMIA: ICD-10-CM

## 2019-03-07 LAB
-  CANDIDA ALBICANS: SIGNIFICANT CHANGE UP
-  CANDIDA GLABRATA: SIGNIFICANT CHANGE UP
-  CANDIDA KRUSEI: SIGNIFICANT CHANGE UP
-  CANDIDA PARAPSILOSIS: SIGNIFICANT CHANGE UP
-  CANDIDA TROPICALIS: SIGNIFICANT CHANGE UP
-  COAGULASE NEGATIVE STAPHYLOCOCCUS: SIGNIFICANT CHANGE UP
-  K. PNEUMONIAE GROUP: SIGNIFICANT CHANGE UP
-  KPC RESISTANCE GENE: SIGNIFICANT CHANGE UP
-  STREPTOCOCCUS SP. (NOT GRP A, B OR S PNEUMONIAE): SIGNIFICANT CHANGE UP
24R-OH-CALCIDIOL SERPL-MCNC: 28.5 NG/ML — LOW (ref 30–80)
A BAUMANNII DNA SPEC QL NAA+PROBE: SIGNIFICANT CHANGE UP
ANION GAP SERPL CALC-SCNC: 7 MMOL/L — SIGNIFICANT CHANGE UP (ref 5–17)
BUN SERPL-MCNC: 27 MG/DL — HIGH (ref 7–18)
CALCIUM SERPL-MCNC: 8.3 MG/DL — LOW (ref 8.4–10.5)
CHLORIDE SERPL-SCNC: 108 MMOL/L — SIGNIFICANT CHANGE UP (ref 96–108)
CK SERPL-CCNC: 264 U/L — HIGH (ref 35–232)
CO2 SERPL-SCNC: 22 MMOL/L — SIGNIFICANT CHANGE UP (ref 22–31)
CORTIS F PM SERPL-MCNC: 32.7 UG/DL — HIGH (ref 2.7–10.5)
CREAT SERPL-MCNC: 0.75 MG/DL — SIGNIFICANT CHANGE UP (ref 0.5–1.3)
CULTURE RESULTS: NO GROWTH — SIGNIFICANT CHANGE UP
E CLOAC COMP DNA BLD POS QL NAA+PROBE: SIGNIFICANT CHANGE UP
E COLI DNA BLD POS QL NAA+NON-PROBE: SIGNIFICANT CHANGE UP
ENTEROCOC DNA BLD POS QL NAA+NON-PROBE: SIGNIFICANT CHANGE UP
ENTEROCOC DNA BLD POS QL NAA+NON-PROBE: SIGNIFICANT CHANGE UP
FERRITIN SERPL-MCNC: 653 NG/ML — HIGH (ref 30–400)
GLUCOSE SERPL-MCNC: 178 MG/DL — HIGH (ref 70–99)
GP B STREP DNA BLD POS QL NAA+NON-PROBE: SIGNIFICANT CHANGE UP
GRAM STN FLD: SIGNIFICANT CHANGE UP
GRAM STN FLD: SIGNIFICANT CHANGE UP
HAEM INFLU DNA BLD POS QL NAA+NON-PROBE: SIGNIFICANT CHANGE UP
HAPTOGLOB SERPL-MCNC: 327 MG/DL — HIGH (ref 34–200)
HBA1C BLD-MCNC: 6.7 % — HIGH (ref 4–5.6)
HCT VFR BLD CALC: 27.6 % — LOW (ref 39–50)
HGB BLD-MCNC: 8.8 G/DL — LOW (ref 13–17)
IRON SATN MFR SERPL: 10 UG/DL — LOW (ref 65–170)
IRON SATN MFR SERPL: 6 % — LOW (ref 20–55)
K OXYTOCA DNA BLD POS QL NAA+NON-PROBE: SIGNIFICANT CHANGE UP
L MONOCYTOG DNA BLD POS QL NAA+NON-PROBE: SIGNIFICANT CHANGE UP
LDH SERPL L TO P-CCNC: 204 U/L — SIGNIFICANT CHANGE UP (ref 120–225)
MAGNESIUM SERPL-MCNC: 2.4 MG/DL — SIGNIFICANT CHANGE UP (ref 1.6–2.6)
MCHC RBC-ENTMCNC: 24.9 PG — LOW (ref 27–34)
MCHC RBC-ENTMCNC: 31.9 GM/DL — LOW (ref 32–36)
MCV RBC AUTO: 78 FL — LOW (ref 80–100)
METHOD TYPE: SIGNIFICANT CHANGE UP
MRSA SPEC QL CULT: SIGNIFICANT CHANGE UP
MSSA DNA SPEC QL NAA+PROBE: SIGNIFICANT CHANGE UP
N MEN ISLT CULT: SIGNIFICANT CHANGE UP
NRBC # BLD: 0 /100 WBCS — SIGNIFICANT CHANGE UP (ref 0–0)
P AERUGINOSA DNA BLD POS NAA+NON-PROBE: SIGNIFICANT CHANGE UP
PHOSPHATE SERPL-MCNC: 2.5 MG/DL — SIGNIFICANT CHANGE UP (ref 2.5–4.5)
PLATELET # BLD AUTO: 188 K/UL — SIGNIFICANT CHANGE UP (ref 150–400)
POTASSIUM SERPL-MCNC: 3.1 MMOL/L — LOW (ref 3.5–5.3)
POTASSIUM SERPL-SCNC: 3.1 MMOL/L — LOW (ref 3.5–5.3)
RBC # BLD: 3.54 M/UL — LOW (ref 4.2–5.8)
RBC # BLD: 3.54 M/UL — LOW (ref 4.2–5.8)
RBC # FLD: 15.6 % — HIGH (ref 10.3–14.5)
RETICS #: 14.5 K/UL — LOW (ref 25–125)
RETICS/RBC NFR: 0.4 % — LOW (ref 0.5–2.5)
S MARCESCENS DNA BLD POS NAA+NON-PROBE: SIGNIFICANT CHANGE UP
S PNEUM DNA BLD POS QL NAA+NON-PROBE: SIGNIFICANT CHANGE UP
S PYO DNA BLD POS QL NAA+NON-PROBE: SIGNIFICANT CHANGE UP
SODIUM SERPL-SCNC: 137 MMOL/L — SIGNIFICANT CHANGE UP (ref 135–145)
SPECIMEN SOURCE: SIGNIFICANT CHANGE UP
TIBC SERPL-MCNC: 162 UG/DL — LOW (ref 250–450)
TRANSFERRIN SERPL-MCNC: 128 MG/DL — LOW (ref 200–360)
UIBC SERPL-MCNC: 152 UG/DL — SIGNIFICANT CHANGE UP (ref 110–370)
VIT B12 SERPL-MCNC: 524 PG/ML — SIGNIFICANT CHANGE UP (ref 232–1245)
WBC # BLD: 14.29 K/UL — HIGH (ref 3.8–10.5)
WBC # FLD AUTO: 14.29 K/UL — HIGH (ref 3.8–10.5)

## 2019-03-07 PROCEDURE — 99223 1ST HOSP IP/OBS HIGH 75: CPT

## 2019-03-07 PROCEDURE — 93306 TTE W/DOPPLER COMPLETE: CPT | Mod: 26

## 2019-03-07 PROCEDURE — 99233 SBSQ HOSP IP/OBS HIGH 50: CPT | Mod: GC

## 2019-03-07 RX ORDER — PIPERACILLIN AND TAZOBACTAM 4; .5 G/20ML; G/20ML
3.38 INJECTION, POWDER, LYOPHILIZED, FOR SOLUTION INTRAVENOUS EVERY 8 HOURS
Qty: 0 | Refills: 0 | Status: DISCONTINUED | OUTPATIENT
Start: 2019-03-07 | End: 2019-03-19

## 2019-03-07 RX ORDER — POTASSIUM CHLORIDE 20 MEQ
40 PACKET (EA) ORAL ONCE
Qty: 0 | Refills: 0 | Status: COMPLETED | OUTPATIENT
Start: 2019-03-07 | End: 2019-03-07

## 2019-03-07 RX ORDER — ENOXAPARIN SODIUM 100 MG/ML
40 INJECTION SUBCUTANEOUS DAILY
Qty: 0 | Refills: 0 | Status: DISCONTINUED | OUTPATIENT
Start: 2019-03-07 | End: 2019-03-11

## 2019-03-07 RX ORDER — IRON SUCROSE 20 MG/ML
200 INJECTION, SOLUTION INTRAVENOUS EVERY 24 HOURS
Qty: 0 | Refills: 0 | Status: DISCONTINUED | OUTPATIENT
Start: 2019-03-07 | End: 2019-03-08

## 2019-03-07 RX ORDER — CEFTRIAXONE 500 MG/1
1 INJECTION, POWDER, FOR SOLUTION INTRAMUSCULAR; INTRAVENOUS ONCE
Qty: 0 | Refills: 0 | Status: DISCONTINUED | OUTPATIENT
Start: 2019-03-07 | End: 2019-03-07

## 2019-03-07 RX ORDER — POTASSIUM CHLORIDE 20 MEQ
40 PACKET (EA) ORAL ONCE
Qty: 0 | Refills: 0 | Status: DISCONTINUED | OUTPATIENT
Start: 2019-03-07 | End: 2019-03-07

## 2019-03-07 RX ORDER — INSULIN LISPRO 100/ML
VIAL (ML) SUBCUTANEOUS
Qty: 0 | Refills: 0 | Status: DISCONTINUED | OUTPATIENT
Start: 2019-03-07 | End: 2019-03-19

## 2019-03-07 RX ORDER — CEFTRIAXONE 500 MG/1
INJECTION, POWDER, FOR SOLUTION INTRAMUSCULAR; INTRAVENOUS
Qty: 0 | Refills: 0 | Status: DISCONTINUED | OUTPATIENT
Start: 2019-03-07 | End: 2019-03-07

## 2019-03-07 RX ADMIN — PIPERACILLIN AND TAZOBACTAM 25 GRAM(S): 4; .5 INJECTION, POWDER, LYOPHILIZED, FOR SOLUTION INTRAVENOUS at 18:25

## 2019-03-07 RX ADMIN — TAMSULOSIN HYDROCHLORIDE 0.4 MILLIGRAM(S): 0.4 CAPSULE ORAL at 22:52

## 2019-03-07 RX ADMIN — Medication 1 APPLICATION(S): at 05:32

## 2019-03-07 RX ADMIN — Medication 1 DROP(S): at 05:31

## 2019-03-07 RX ADMIN — HEPARIN SODIUM 5000 UNIT(S): 5000 INJECTION INTRAVENOUS; SUBCUTANEOUS at 05:31

## 2019-03-07 RX ADMIN — CARBIDOPA AND LEVODOPA 1 TABLET(S): 25; 100 TABLET ORAL at 05:31

## 2019-03-07 RX ADMIN — Medication 30 MILLILITER(S): at 22:52

## 2019-03-07 RX ADMIN — CARBIDOPA AND LEVODOPA 1 TABLET(S): 25; 100 TABLET ORAL at 18:26

## 2019-03-07 RX ADMIN — Medication 100 MICROGRAM(S): at 05:31

## 2019-03-07 RX ADMIN — Medication 1 DROP(S): at 14:10

## 2019-03-07 RX ADMIN — PIPERACILLIN AND TAZOBACTAM 25 GRAM(S): 4; .5 INJECTION, POWDER, LYOPHILIZED, FOR SOLUTION INTRAVENOUS at 22:52

## 2019-03-07 RX ADMIN — Medication 1 DROP(S): at 22:56

## 2019-03-07 RX ADMIN — IRON SUCROSE 110 MILLIGRAM(S): 20 INJECTION, SOLUTION INTRAVENOUS at 12:39

## 2019-03-07 RX ADMIN — CARBIDOPA AND LEVODOPA 1 TABLET(S): 25; 100 TABLET ORAL at 14:11

## 2019-03-07 RX ADMIN — Medication 40 MILLIEQUIVALENT(S): at 12:41

## 2019-03-07 RX ADMIN — Medication 1 APPLICATION(S): at 18:26

## 2019-03-07 RX ADMIN — Medication 40 MILLIEQUIVALENT(S): at 22:53

## 2019-03-07 NOTE — ADVANCED PRACTICE NURSE CONSULT - RECOMMEDATIONS
-Clean the affected areas with normal saline and apply skin prep to the surrounding skin  -Apply Adaptic gauze to the wound bed and cover with a Foam dressing Q 72hrs PRN  -Elevate/float the patients heels using heel protectors and reposition the patient Q 2hrs using wedges or pillows

## 2019-03-07 NOTE — PROGRESS NOTE ADULT - PROBLEM SELECTOR PLAN 5
patient on atenolol at home, will hold for now in setting of hypotension  f/u echocardiogram continue with sinemet if patient able to tolerate PO at home dose  sinemet 10/100 TID

## 2019-03-07 NOTE — PROGRESS NOTE ADULT - PROBLEM SELECTOR PLAN 3
continue with sinemet if patient able to tolerate PO at home dose  sinemet 10/100 TID p/w hypothermia of  temp of 94.7 rectally and hypotension, likely 2/2 hypothyroid state precipitated by missed dose and malignancy  s/p Hydrocortisone 50 mg IV x 1 dose then IV synthroid 50 microgm,  c/w synthroid 100mcg daily PO, (home dose)  pt. missed dose for 4 days  TSH within normal limits  FU free T4

## 2019-03-07 NOTE — PROGRESS NOTE ADULT - SUBJECTIVE AND OBJECTIVE BOX
PGY 1 Note discussed with supervising resident and primary attending    Patient is a 83y old  Male who presents with a chief complaint of weakness, lethargy (07 Mar 2019 11:40)      INTERVAL HPI/ OVERNIGHT EVENTS: no acute overnight events, AAO X3, still weak and lethargic, complaints of mild pain in Rt. lower quadrant of abdomen.    MEDICATIONS  (STANDING):  artificial  tears Solution 1 Drop(s) Left EYE three times a day  carbidopa/levodopa  10/100 1 Tablet(s) Oral <User Schedule>  dextrose 5% + sodium chloride 0.9%. 1000 milliLiter(s) (75 mL/Hr) IV Continuous <Continuous>  erythromycin   Ointment 1 Application(s) Right EYE two times a day  iron sucrose IVPB 200 milliGRAM(s) IV Intermittent every 24 hours  levothyroxine 100 MICROGram(s) Oral daily  potassium chloride   Powder 40 milliEquivalent(s) Oral once  tamsulosin 0.4 milliGRAM(s) Oral at bedtime    MEDICATIONS  (PRN):      __________________________________________________  REVIEW OF SYSTEMS:    CONSTITUTIONAL: No fever,   EYES: no acute visual disturbances  NECK: No pain or stiffness  RESPIRATORY: No cough; No shortness of breath  CARDIOVASCULAR: No chest pain, no palpitations  GASTROINTESTINAL: +mild pain on Rt. lower quadrant. No nausea or vomiting; No diarrhea   NEUROLOGICAL: No headache or numbness, no tremors  MUSCULOSKELETAL: No joint pain, no muscle pain  GENITOURINARY: no dysuria, no frequency, no hesitancy  PSYCHIATRY: no depression , no anxiety  ALL OTHER  ROS negative        Vital Signs Last 24 Hrs  T(C): 36.3 (07 Mar 2019 07:54), Max: 36.5 (07 Mar 2019 02:37)  T(F): 97.3 (07 Mar 2019 07:54), Max: 97.7 (07 Mar 2019 02:37)  HR: 60 (07 Mar 2019 11:42) (56 - 71)  BP: 105/55 (07 Mar 2019 11:42) (91/48 - 105/55)  BP(mean): --  RR: 16 (07 Mar 2019 07:54) (16 - 18)  SpO2: 99% (07 Mar 2019 11:42) (95% - 100%)    ________________________________________________  PHYSICAL EXAM:  GENERAL: NAD  HEENT: Normocephalic; conjunctivae and sclerae clear; moist mucous membranes;   NECK : supple  CHEST/LUNG: Clear to auscultation bilaterally with good air entry   HEART: S1 S2  regular; no murmurs, gallops or rubs  ABDOMEN: Soft, + mild tender and mass on Rt. lower quadrant, Nondistended; Bowel sounds present  EXTREMITIES: no cyanosis; no edema; no calf tenderness  SKIN: warm and dry; no rash, DTI encompassing the Bilateral Gluteus and Sacrum;  NERVOUS SYSTEM:  Awake and alert; Oriented  to place, person and time ; no new deficits    _________________________________________________  LABS:                        8.8    14.29 )-----------( 188      ( 07 Mar 2019 06:57 )             27.6     03-07    137  |  108  |  27<H>  ----------------------------<  178<H>  3.1<L>   |  22  |  0.75    Ca    8.3<L>      07 Mar 2019 06:57  Phos  2.5     03-07  Mg     2.4     -07    TPro  6.3  /  Alb  2.1<L>  /  TBili  1.2  /  DBili  x   /  AST  49<H>  /  ALT  47  /  AlkPhos  277<H>  03-      Urinalysis Basic - ( 06 Mar 2019 16:48 )    Color: Yellow / Appearance: Clear / S.010 / pH: x  Gluc: x / Ketone: Small  / Bili: Negative / Urobili: Negative   Blood: x / Protein: 30 mg/dL / Nitrite: Negative   Leuk Esterase: Negative / RBC: 10-25 /HPF / WBC 0-2 /HPF   Sq Epi: x / Non Sq Epi: Few /HPF / Bacteria: Few /HPF      CAPILLARY BLOOD GLUCOSE            RADIOLOGY & ADDITIONAL TESTS:    < from: CT Abdomen and Pelvis w/ IV Cont (19 @ 16:07) >  IMPRESSION:    Multiple liver lesions consistent with metastases    large mass in the lower abdomen/upper pelvis surrounding the sigmoid   colon which may be secondary to just tumor, lymphoma or adenocarcinoma.   There is no bowel obstruction.            Imaging Personally Reviewed:  YES    Consultant(s) Notes Reviewed:  YES  Care Discussed with Consultants : YES    Plan of care was discussed with patient and /or primary care giver; all questions and concerns were addressed and care was aligned with patient's wishes. PGY 1 Note discussed with supervising resident and primary attending    Patient is a 83y old  Male who presents with a chief complaint of weakness, lethargy (07 Mar 2019 11:40)      INTERVAL HPI/ OVERNIGHT EVENTS: no acute overnight events, AAO X3, still weak and lethargic, complaints of mild pain in Rt. lower quadrant of abdomen.    MEDICATIONS  (STANDING):  artificial  tears Solution 1 Drop(s) Left EYE three times a day  carbidopa/levodopa  10/100 1 Tablet(s) Oral <User Schedule>  dextrose 5% + sodium chloride 0.9%. 1000 milliLiter(s) (75 mL/Hr) IV Continuous <Continuous>  erythromycin   Ointment 1 Application(s) Right EYE two times a day  iron sucrose IVPB 200 milliGRAM(s) IV Intermittent every 24 hours  levothyroxine 100 MICROGram(s) Oral daily  potassium chloride   Powder 40 milliEquivalent(s) Oral once  tamsulosin 0.4 milliGRAM(s) Oral at bedtime    MEDICATIONS  (PRN):      __________________________________________________  REVIEW OF SYSTEMS:    CONSTITUTIONAL: No fever,   EYES: no acute visual disturbances  NECK: No pain or stiffness  RESPIRATORY: No cough; No shortness of breath  CARDIOVASCULAR: No chest pain, no palpitations  GASTROINTESTINAL: +mild pain on Rt. lower quadrant. No nausea or vomiting; No diarrhea   NEUROLOGICAL: No headache or numbness, no tremors  MUSCULOSKELETAL: No joint pain, no muscle pain  GENITOURINARY: no dysuria, no frequency, no hesitancy  PSYCHIATRY: no depression , no anxiety  ALL OTHER  ROS negative        Vital Signs Last 24 Hrs  T(C): 36.3 (07 Mar 2019 07:54), Max: 36.5 (07 Mar 2019 02:37)  T(F): 97.3 (07 Mar 2019 07:54), Max: 97.7 (07 Mar 2019 02:37)  HR: 60 (07 Mar 2019 11:42) (56 - 71)  BP: 105/55 (07 Mar 2019 11:42) (91/48 - 105/55)  BP(mean): --  RR: 16 (07 Mar 2019 07:54) (16 - 18)  SpO2: 99% (07 Mar 2019 11:42) (95% - 100%)    ________________________________________________  PHYSICAL EXAM:  GENERAL: NAD  HEENT: Normocephalic; conjunctivae and sclerae clear; moist mucous membranes;   NECK : supple  CHEST/LUNG: Clear to auscultation bilaterally with good air entry   HEART: S1 S2  regular; no murmurs, gallops or rubs  ABDOMEN: Soft, + mild tender and mass on Rt. lower quadrant, Nondistended; Bowel sounds present  EXTREMITIES: no cyanosis; no edema; no calf tenderness  SKIN: warm and dry; no rash, DTI encompassing the Bilateral Gluteus and Sacrum;  NERVOUS SYSTEM:  Awake and alert; Oriented  to place, person and time ; no new deficits    _________________________________________________  LABS:                        8.8    14.29 )-----------( 188      ( 07 Mar 2019 06:57 )             27.6     03-07    137  |  108  |  27<H>  ----------------------------<  178<H>  3.1<L>   |  22  |  0.75    Ca    8.3<L>      07 Mar 2019 06:57  Phos  2.5     03-07  Mg     2.4     -07    TPro  6.3  /  Alb  2.1<L>  /  TBili  1.2  /  DBili  x   /  AST  49<H>  /  ALT  47  /  AlkPhos  277<H>  03-      Urinalysis Basic - ( 06 Mar 2019 16:48 )    Color: Yellow / Appearance: Clear / S.010 / pH: x  Gluc: x / Ketone: Small  / Bili: Negative / Urobili: Negative   Blood: x / Protein: 30 mg/dL / Nitrite: Negative   Leuk Esterase: Negative / RBC: 10-25 /HPF / WBC 0-2 /HPF   Sq Epi: x / Non Sq Epi: Few /HPF / Bacteria: Few /HPF      CAPILLARY BLOOD GLUCOSE            RADIOLOGY & ADDITIONAL TESTS:    < from: CT Abdomen and Pelvis w/ IV Cont (19 @ 16:07) >    FINDINGS:    The lower chest is unremarkable.    The liver is remarkable fora numerous hypodense lesions consistent with   metastatic disease. The fluid-filled gallbladder is appreciated.    The spleen, pancreas and adrenal glands are unremarkable.The kidneys are   unremarkable. The fluid-filled bladder appears intact.    There is a large heterogeneous mass in the lower abdomen measuring 2.5 cm   transverse x 9.2 cm AP x 8.8 cm in length. This mass circumferentially   envelops the sigmoid colon without intraluminal extension or obstruction.   Differential diagnosis includes lymphoma, just tumor or adenocarcinoma.    There is no intraperitoneal free air.  There is no free fluid. The aorta   is not aneurysmal.    There is no significant abdominal, retroperitoneal or pelvic   lymphadenopathy. The pelvic structures demonstrate enlarged prostate   gland.    The osseous structures demonstrate degenerative changes of the spine and   right total hip replacement.    IMPRESSION:    Multiple liver lesions consistent with metastases    large mass in the lower abdomen/upper pelvis surrounding the sigmoid   colon which may be secondary to just tumor, lymphoma or adenocarcinoma.   There is no bowel obstruction.              Imaging Personally Reviewed:  YES    Consultant(s) Notes Reviewed:  YES  Care Discussed with Consultants : YES    Plan of care was discussed with patient and /or primary care giver; all questions and concerns were addressed and care was aligned with patient's wishes.

## 2019-03-07 NOTE — CONSULT NOTE ADULT - SUBJECTIVE AND OBJECTIVE BOX
Patient is a 83y old  Male who presents with a chief complaint of weakness, lethargy (07 Mar 2019 11:40)    HPI: 83M with PMH of HTN, HLD, hypothyroidism, CHF with preserved EF, Parkinson's disease, comes in by EMS after visiting phlebotomist called 911 as patient has not been eating or drinking for the past several days and has been having waxing/waning confusion. Wife reports that patient with decreased movement and unable to walk. Pt has also been unable to talk just whispering, and overall much more weak and hasn't taken medication for the past 2 days.      REVIEW OF SYSTEMS  Constitutional:   No fever, +fatigue, no pallor, no night sweats, no weight loss.  HEENT:   No eye pain, no vision changes, no icterus, no mouth ulcers.  Respiratory:   No shortness of breath, no cough, no respiratory distress.   Cardiovascular:   No chest pain, no palpitations.   Gastrointestinal: No abdominal pain, no nausea, no vomiting , no diarrhea,  no constipation, + hematochezia, no melena.  Skin:   No rashes, no jaundice, no eczema.   Musculoskeletal:   No joint pain, no swelling, no myalgia.   Neurologic:   No headache, no seizure, no weakness.   Genitourinary:   No dysuria, no decreased urine output.  Psychiatric:  No depression, no anxiety,   Endocrine:   No thyroid disease, no diabetes.  Heme/Lymphatic:   No anemia, no blood transfusions, no lymph node enlargement, no bleeding, no bruising.  ___________________________________________________________________________________________  Allergies    No Known Allergies    Intolerances      MEDICATIONS  (STANDING):  artificial  tears Solution 1 Drop(s) Left EYE three times a day  carbidopa/levodopa  10/100 1 Tablet(s) Oral <User Schedule>  dextrose 5% + sodium chloride 0.9%. 1000 milliLiter(s) (75 mL/Hr) IV Continuous <Continuous>  erythromycin   Ointment 1 Application(s) Right EYE two times a day  iron sucrose IVPB 200 milliGRAM(s) IV Intermittent every 24 hours  levothyroxine 100 MICROGram(s) Oral daily  potassium chloride   Powder 40 milliEquivalent(s) Oral once  tamsulosin 0.4 milliGRAM(s) Oral at bedtime    MEDICATIONS  (PRN):      PAST MEDICAL & SURGICAL HISTORY:  Hypothyroidism  Sacral decubitus ulcer, stage II  HLD (hyperlipidemia)  CHF (congestive heart failure)  Parkinson's disease  Hypertension  H/O hernia repair    FAMILY HISTORY:  No pertinent family history in first degree relatives    Social History: No hsitory of : Tobacco use, IVDA, EToH  ______________________________________________________________________________________    PHYSICAL EXAM    Daily     Daily Weight in k.7 (07 Mar 2019 07:42)  BMI:   Change in Weight:  Vital Signs Last 24 Hrs  T(C): 36.3 (07 Mar 2019 07:54), Max: 36.5 (07 Mar 2019 02:37)  T(F): 97.3 (07 Mar 2019 07:54), Max: 97.7 (07 Mar 2019 02:37)  HR: 60 (07 Mar 2019 11:42) (56 - 71)  BP: 105/55 (07 Mar 2019 11:42) (91/48 - 105/55)  BP(mean): --  RR: 16 (07 Mar 2019 07:54) (16 - 18)  SpO2: 99% (07 Mar 2019 11:42) (95% - 100%)    General:  alert, awake, chronically ill appearing in NAD.  HEENT:    Normal appearance of conjunctiva, ears, nose, lips, oropharynx, and oral mucosa, anicteric.  Neck:  No masses, no asymmetry.  Lymph Nodes:  No lymphadenopathy.   Cardiovascular:  RRR normal S1/S2, no murmur.  Respiratory:  CTA B/L, normal respiratory effort.   Abdominal:   soft, no masses or tenderness, normoactive BS, NT/ND, no HSM.  Extremities:   No clubbing or cyanosis, normal capillary refill, no edema.   Skin:   No rash, jaundice, lesions, eczema.   Musculoskeletal:  No joint swelling, erythema or tenderness.   Neuro: No focal deficits.   Other:   _______________________________________________________________________________________________  Lab Results:                          8.8    14.29 )-----------( 188      ( 07 Mar 2019 06:57 )             27.6     03-07    137  |  108  |  27<H>  ----------------------------<  178<H>  3.1<L>   |  22  |  0.75    Ca    8.3<L>      07 Mar 2019 06:57  Phos  2.5       Mg     2.4         TPro  6.3  /  Alb  2.1<L>  /  TBili  1.2  /  DBili  x   /  AST  49<H>  /  ALT  47  /  AlkPhos  277<H>  03    LIVER FUNCTIONS - ( 06 Mar 2019 14:24 )  Alb: 2.1 g/dL / Pro: 6.3 g/dL / ALK PHOS: 277 U/L / ALT: 47 U/L DA / AST: 49 U/L / GGT: x               CARDIAC MARKERS ( 07 Mar 2019 09:43 )  x     / x     / 264 U/L / x     / x      CARDIAC MARKERS ( 06 Mar 2019 14:24 )  0.042 ng/mL / x     / x     / x     / x          Stool Results:          RADIOLOGY RESULTS:    SURGICAL PATHOLOGY:

## 2019-03-07 NOTE — PROGRESS NOTE ADULT - PROBLEM SELECTOR PLAN 7
does not look infected, cover daily, clean with NS  wound care consulted not in exacerbation  patient on atenolol at home, will hold for now in setting of hypotension  normal echo with GII DD

## 2019-03-07 NOTE — PROGRESS NOTE ADULT - PROBLEM SELECTOR PLAN 8
IMPROVE VTE Individual Risk Assessment          RISK                                                          Points  [  ] Previous VTE                                                3  [  ] Thrombophilia                                             2  [  ] Lower limb paralysis                                   2        (unable to hold up >15 seconds)    [  ] Current Cancer                                             2         (within 6 months)  [  x] Immobilization > 24 hrs                              1  [  ] ICU/CCU stay > 24 hours                             1  [ x ] Age > 60                                                         1    IMPROVE VTE Score: 2    heparin for vte prophylaxis does not look infected, cover daily, clean with NS  wound care consulted

## 2019-03-07 NOTE — CONSULT NOTE ADULT - SUBJECTIVE AND OBJECTIVE BOX
MEDICAL ONCOLOGY CONSULTATION    HPI: Wife at bedside. Both patient and wife are poor historian. Pt is a 82y/o man with PMH of HTN, HLD, hypothyroidism, CHF, Parkinson's disease (bedbound), after home draw phlebotomist found patient to be very ill while seeing the patient. In ED pt found with clinical sepsis and ID work up has found bacteremia with positive blood culture. Pt also found with CT image c/w metastatic disease with possible mass.  In ED patient was noted to be dehydrated and weak with poor appetite. Wife at bedside shares that patient has not been eating or drinking liquids for the past several days and has been having waxing/waning confusion for the past day.  Pt denies nausea and vomiting, diarrhea and constipation, denies blood in his stools. Last colonoscopy from 2008 w/ no findings.     Currently pt feels weak     NKDA  PAST MEDICAL & SURGICAL HISTORY:  as above  H/O hernia repair  Soc Hx NC  Fm Hx NC  ROS pt is unable to provide detailed review of systems    Home Medications:  aspirin 81 mg oral tablet: 1 tab(s) orally once a day (06 Mar 2019 18:17)  atenolol 25 mg oral tablet: 1 tab(s) orally once a day (06 Mar 2019 18:17)  Flomax 0.4 mg oral capsule: 1 cap(s) orally once a day (06 Mar 2019 18:17)  levothyroxine 100 mcg (0.1 mg) oral tablet: 1 tab(s) orally once a day (06 Mar 2019 18:17)  losartan 100 mg oral tablet: 1 tab(s) orally once a day (06 Mar 2019 18:17)  Sinemet 10 mg-100 mg oral tablet: 1 tab(s) orally 3 times a day (06 Mar 2019 18:17)    03-07    137  |  108  |  27<H>  ----------------------------<  178<H>  3.1<L>   |  22  |  0.75    Ca    8.3<L>      07 Mar 2019 06:57  Phos  2.5     03-07  Mg     2.4     03-07    TPro  6.3  /  Alb  2.1<L>  /  TBili  1.2  /  DBili  x   /  AST  49<H>  /  ALT  47  /  AlkPhos  277<H>  03-06  MEDICATIONS  (STANDING):  artificial  tears Solution 1 Drop(s) Left EYE three times a day  carbidopa/levodopa  10/100 1 Tablet(s) Oral <User Schedule>  dextrose 5% + sodium chloride 0.9%. 1000 milliLiter(s) (75 mL/Hr) IV Continuous <Continuous>  enoxaparin Injectable 40 milliGRAM(s) SubCutaneous daily  erythromycin   Ointment 1 Application(s) Right EYE two times a day  insulin lispro (HumaLOG) corrective regimen sliding scale   SubCutaneous three times a day before meals  iron sucrose IVPB 200 milliGRAM(s) IV Intermittent every 24 hours  levothyroxine 100 MICROGram(s) Oral daily  piperacillin/tazobactam IVPB. 3.375 Gram(s) IV Intermittent every 8 hours  potassium chloride   Powder 40 milliEquivalent(s) Oral once  tamsulosin 0.4 milliGRAM(s) Oral at bedtime    Vital Signs Last 24 Hrs  T(C): 36.2 (07 Mar 2019 15:51), Max: 36.5 (07 Mar 2019 02:37)  T(F): 97.1 (07 Mar 2019 15:51), Max: 97.7 (07 Mar 2019 02:37)  HR: 66 (07 Mar 2019 15:51) (56 - 71)  BP: 108/53 (07 Mar 2019 15:51) (91/48 - 108/53)  RR: 16 (07 Mar 2019 15:51) (16 - 16)  SpO2: 97% (07 Mar 2019 15:51) (95% - 99%)    NC/AT; cachetic; mild distress; alert and obeys simple commands  PERRLA; EOMI  no JVD; supple; MMM  Nl S1 S2 RR; no M  CTA b/l; no W  Ab soft; no guarding; decreased BS  no CCE; warm and dry  motor 3/5 in the RUE and RLE; 2/5 in LUE and LLE    LABS                        8.8    14.29 )-----------( 188      ( 07 Mar 2019 06:57 )             27.6   03-07    137  |  108  |  27<H>  ----------------------------<  178<H>  3.1<L>   |  22  |  0.75    Ca    8.3<L>      07 Mar 2019 06:57  Phos  2.5     03-07  Mg     2.4     03-07    TPro  6.3  /  Alb  2.1<L>  /  TBili  1.2  /  DBili  x   /  AST  49<H>  /  ALT  47  /  AlkPhos  277<H>  03-06  < from: CT Abdomen and Pelvis w/ IV Cont (03.06.19 @ 16:07) >  IMPRESSION:    Multiple liver lesions consistent with metastases    large mass in the lower abdomen/upper pelvis surrounding the sigmoid   colon which may be secondary to just tumor, lymphoma or adenocarcinoma.   There is no bowel obstruction.    < end of copied text >

## 2019-03-07 NOTE — PROGRESS NOTE ADULT - PROBLEM SELECTOR PLAN 2
c/w synthroid 100mcg daily PO, if patient unable to tolerate PO, c/w synthroid IV 50mcg daily until able to tolerate  TSH within normal limits - has palpable mass in Rt. lower abdomen, mildly tender, had bloody bowel movement before 3 nights, now no bowel complaints  - CT abdomen showed Multiple liver lesions consistent with metastases and large mass in the lower abdomen/upper pelvis surrounding the sigmoid   colon which may be secondary to just tumor, lymphoma or adenocarcinoma.   - occult negative  - FU CEA, CA 19-9  - GI Dr. Murdock consulted

## 2019-03-07 NOTE — PROGRESS NOTE ADULT - ATTENDING COMMENTS
Patient seen/evaluated at bedside 3/7/2019. I agree with the resident progress note/outlined plan of care. My independent findings and conclusions are documented.    Chart documents reviewed. Care assumed from Dr. Snow  BP and temperature improved today. No recurrence of hypothermia, fevers/chills, nausea/vomiting  Pt is very soft spoken but appropriate in responses.  Update provided to son at bedside, Terell who would like to coordinate discussion with heme onc and palliative care services    1. Sepsis present on admission (with hypothermia, hypotension)  2. Gram negative bacteremia  3. infectious encephalopathy  4. Sigmoid mass with suspected metastatic disease  5. debility/ deconditioning  6. hypothyroidism  7. DM T II    zosyn, ID consult, f/u ID and sensitivities of cultures  s/p IV synthroid and dose of steroids in the ED  repeat blood cultures tomorrow  appreciate discussion with hematology-oncology  check CT chest  c/w home dose of synthroid  dvt ppx Patient seen/evaluated at bedside 3/7/2019. I agree with the resident progress note/outlined plan of care. My independent findings and conclusions are documented.    Chart documents reviewed. Care assumed from Dr. Snow  BP and temperature improved today. No recurrence of hypothermia, fevers/chills, nausea/vomiting  Pt is very soft spoken but appropriate in responses.  Update provided to son at bedside, Terell who would like to coordinate discussion with heme onc and palliative care services    PE: chronically ill appearing  S1S2 RRR  CTAB/l no accessory muscle use  soft, NT, ND, + BS  significant for mild RUQ and bilateral lower quadrant tenderness on palpation    1. Sepsis present on admission (with hypothermia, hypotension)  2. Gram negative bacteremia  3. infectious encephalopathy  4. Sigmoid mass with suspected metastatic disease  5. debility/ deconditioning  6. hypothyroidism  7. DM T II    zosyn, ID consult, f/u ID and sensitivities of cultures  s/p IV synthroid and dose of steroids in the ED  repeat blood cultures tomorrow  appreciate discussion with hematology-oncology  check CT chest  c/w home dose of synthroid  dvt ppx

## 2019-03-07 NOTE — CONSULT NOTE ADULT - ASSESSMENT
GI asked to evaluate  this 84 yo male with past medical hx as previously mentions admitted for fatigue, increased lethargy and strong suspicion for sepsis.  Patient seen and examined lying in bed with family at D. Denies nausea, vomiting, constipation or abdominal pain. Patient reports on soft BM today and states he had bloody BM 3 days ago. Speech barely audible as patient speaks in whisper.  CT A/P showing colonic mass with multiple masses in liver, suspected for liver metastasis. Patient reports having a colonoscopy 5 years ago. He does not recall abnormal findings. Will need goals of care discussion with patient and family.  Primary team arranging for IR bx tomorrow. GI asked to evaluate  this 82 yo male with past medical hx as previously mentions admitted for fatigue, increased lethargy and strong suspicion for sepsis.  Patient seen and examined lying in bed with family at D. Denies nausea, vomiting, constipation or abdominal pain. Patient reports on soft BM today and states he had bloody BM 3 days ago. Speech barely audible as patient speaks in whisper.  CT A/P showing colonic mass with multiple masses in liver, suspected for liver metastasis. Patient reports having a colonoscopy 5 years ago. He does not recall abnormal findings. Will need goals of care discussion with patient and family.  Primary team arranging for IR bx.

## 2019-03-07 NOTE — CONSULT NOTE ADULT - ASSESSMENT
83 year old man with Parkinson disease, CHF, and multiple co-morbidities p/w sepsis from gram negative bacteremia and found with sigmoid mass and hepatic lesions on CT scan    Problem #1 Sigmoid mass/hepatic mets  -reviewed CT scan and suspicious for malignant disease but tissue confirmation is needed once patient is stable from sepsis  -work up of the metastatic disease including diagnostic colonoscopy (preferred) or CT guided bx can be done once pt recovers completely from the current infection  -obtain CEA and CT chest w/o contrast   -pt may still benefit from palliative chemotherapy despite Parkinson disease but goals of care discussion including tx options as well as hospiece should be planned once pt recovers from the acute infection  -it is appropriate to obtain hospiece evaluation during this hospitalization given gravity of pt's condition    Problem #2 Anemia   -multifactorial including anemia of chronic disease as well as possible GI bleed  -transfuse to keep hg > 7    Problem #3 Sepsis  -continue with abx as per primary team and ID team

## 2019-03-07 NOTE — PHYSICAL THERAPY INITIAL EVALUATION ADULT - CRITERIA FOR SKILLED THERAPEUTIC INTERVENTIONS
predicted duration of therapy intervention/therapy frequency/impairments found/anticipated discharge recommendation/rehab potential

## 2019-03-07 NOTE — CONSULT NOTE ADULT - ASSESSMENT
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 Arteaga

## 2019-03-07 NOTE — PROGRESS NOTE ADULT - PROBLEM SELECTOR PLAN 1
may be adrenal crisis vs hypothyroid state vs sepsis  s/p 1 dose of Zosyn, IVF in ED  f/u blood cultures, urine culture  UA and CXR negative on admission  patient has not been able to take medications for the last 2 days  f/u PM cortisol level  will try 1 dose of IV hydrocortisone, IV synthroid 50mcg  TSH within normal limits - however may be acute hypothyroid state without sufficient TSH adjustment  c/w synthroid 100mcg daily  f/u GI consult - Dr. Abreu - patient may need colonoscopy for further evaluation of mass  **** PRIMARY TEAM TO CONSULT IR IN AM FOR BIOPSY OF MASS - blood culture -p/w hypothermia and hypotension, s/p code sepsis  - mild leukocytosis( also could be 2/2 steroid), afebrile  - blood culture growing gm -ve bacteria, FU final result  - started on zosyn D1  - UA and CXR, CT head negative  - will send repeat blood culture after 48hrs  - ID Dr. Artis

## 2019-03-07 NOTE — ADVANCED PRACTICE NURSE CONSULT - ASSESSMENT
This is a 83yr old male patient admitted for Hypothermia, presenting with a DTI encompassing the Bilateral Gluteus and Sacrum; with epidermal separation, red tissue, and scant drainage

## 2019-03-07 NOTE — PROGRESS NOTE ADULT - ASSESSMENT
Patient admitted for fatigue, increased lethargy, suspected sepsis given hypothermia and clinical condition, s/p Iv fluids and Zosyn in ED, will obtain cortisol and treat for suspected hypothyroid state, continue with Parkinson medication; patient also noted to have colonic mass with multiple masses in liver, suspected for liver metastasis Patient admitted for fatigue, increased lethargy, suspected sepsis given hypothermia and clinical condition, s/p Iv fluids and Zosyn in ED, and treated for suspected hypothyroid state, patient also noted to have colonic mass with multiple masses in liver, suspected for liver metastasis. Blood culture grew gm -ve bacteria, pending final result.

## 2019-03-07 NOTE — PROGRESS NOTE ADULT - PROBLEM SELECTOR PLAN 4
hold off antihypertensives for now   BP getting better  continue to monitor BP occult negative  iron profile shows anemia of chronic disease likely due to malignancy  will give venofer

## 2019-03-07 NOTE — CONSULT NOTE ADULT - PROBLEM SELECTOR RECOMMENDATION 9
Hgb 9.5-->8.8  stool occult blood negative  likely chronic disease vs malignancy  GI ppx  monitor CBC

## 2019-03-07 NOTE — PATIENT PROFILE ADULT - FLU SEASON?
Andres Zamarripa was admitted to  from PACU via cart accompanied by wife.   Reason for hospitalization is Vaporization laser of prostatebladder.   Upon arrival, patient is stable. Patient has history significant for   Past Medical History:   Diagnosis Date   • Acute CHF (CMS/HCC)    • Allergy    • Anesthesia complication     During most recent pacemaker procedure had 2 episodes of apnea- per spouse- had to \"wake pt up a bit\"   • Arthritis    • Atrial fibrillation (CMS/HCC)    • Cardiac pacemaker 08/01/2014    [employer] Advisa   • Cataract    • Chronic kidney disease    • Chronic pain    • Congestive cardiac failure (CMS/HCC)    • Coronary artery disease    • Diabetes mellitus (CMS/HCC)    • Drop foot gait    • Esophageal reflux    • Essential (primary) hypertension    • Malignant neoplasm (CMS/HCC)     Skin cancer   • Other and unspecified hyperlipidemia    • Severe sepsis (CMS/HCC) 12/13/2017   • Sleep apnea     no CPAP   • Sleep apnea    • Thyroid disease      .  Patient oriented to bed, call light, , room and unit.  Patient provided with the following educational materials upon admission:safety, advanced directives, infection control and pain.   Level of understanding patient verbalized understanding.   Admission orders received at this time.   Dr. Del Castillo notified of patient arrival.   See Epic documentation for patient individualized nursing care plan.   Yes...

## 2019-03-07 NOTE — CONSULT NOTE ADULT - SUBJECTIVE AND OBJECTIVE BOX
HPI:  83M with PMH of HTN, HLD, hypothyroidism, CHF with preserved EF, Parkinson's disease, comes in by EMS after visiting phlebotomist called 911. Per chart, patient was noted to be very dehydrated, and very weak. Wife at bedside shares that patient has not been eating or drinking liquids for the past several days and has been having waxing/waning confusion for the past day. Patient has had decreased movement, however unable to bear weight. Pt has been able to whispering, however overall much more weak, has not taken medication for the past 2 days. ED physician on admission initiated goals of care however wife Initiated goals of care, but wife has not decided. Pt too sick to have discussion about goals of care at present, will defer until patient more improved. (06 Mar 2019 16:51)    83 year old male with PMH parkinsons disease, hypothyroidism, HTN, HLD, and CHF seen and examined at bedside for surgical consult due to findings of colon mass on CT scan. Patient presented to hospital due to weakness and lethargy. Patient has had decreased oral intake over the past three days. Denies nausea and vomiting, diarrhea and constipation, denies blood in his stools. Last colonoscopy was approximately 4-5 years ago with no significant findings per patient, states it was done in this hospital. Colonoscopy report from 2008 appreciated in EMR - no findings noted on study from 2008. Denies weight loss. Patient only able to whisper at present time, difficult to obtain full history.     PAST MEDICAL & SURGICAL HISTORY:  Hypothyroidism  Sacral decubitus ulcer, stage II  HLD (hyperlipidemia)  CHF (congestive heart failure)  Parkinson's disease  Hypertension  H/O hernia repair    Review of Systems: Contained within HPI    MEDICATIONS  (STANDING):  artificial  tears Solution 1 Drop(s) Left EYE three times a day  carbidopa/levodopa  10/100 1 Tablet(s) Oral <User Schedule>  dextrose 5% + sodium chloride 0.9%. 1000 milliLiter(s) (75 mL/Hr) IV Continuous <Continuous>  erythromycin   Ointment 1 Application(s) Right EYE two times a day  iron sucrose IVPB 200 milliGRAM(s) IV Intermittent every 24 hours  levothyroxine 100 MICROGram(s) Oral daily  potassium chloride   Powder 40 milliEquivalent(s) Oral once  potassium chloride   Powder 40 milliEquivalent(s) Oral once  tamsulosin 0.4 milliGRAM(s) Oral at bedtime    FAMILY HISTORY:  No pertinent family history in first degree relatives    Vital Signs Last 24 Hrs  T(C): 36.3 (07 Mar 2019 07:54), Max: 36.5 (07 Mar 2019 02:37)  T(F): 97.3 (07 Mar 2019 07:54), Max: 97.7 (07 Mar 2019 02:37)  HR: 56 (07 Mar 2019 07:54) (56 - 71)  BP: 99/62 (07 Mar 2019 07:54) (88/60 - 99/62)  RR: 16 (07 Mar 2019 07:54) (16 - 18)  SpO2: 99% (07 Mar 2019 07:54) (95% - 100%)    Physical Exam:    General:  Appears stated age, well-groomed, well-nourished, no distress  Eyes: EOMI  HENT:  WNL, no JVD  Chest: respirations nonlabored  Abdomen:  Soft, nondistended, palpable right sided abdominal mass slightly tender to palpation  Extremities: no edema bilaterally  Skin: warm and dry  Musculoskeletal: no calf tenderness  Neuro:  Alert, oriented to time, place and person   Psych: normal affect    LABS:                        8.8    14.29 )-----------( 188      ( 07 Mar 2019 06:57 )             27.6     03-07    137  |  108  |  27<H>  ----------------------------<  178<H>  3.1<L>   |  22  |  0.75    Ca    8.3<L>      07 Mar 2019 06:57  Phos  2.5     03-07  Mg     2.4     -07    TPro  6.3  /  Alb  2.1<L>  /  TBili  1.2  /  DBili  x   /  AST  49<H>  /  ALT  47  /  AlkPhos  277<H>  03-06    Urinalysis Basic - ( 06 Mar 2019 16:48 )    Color: Yellow / Appearance: Clear / S.010 / pH: x  Gluc: x / Ketone: Small  / Bili: Negative / Urobili: Negative   Blood: x / Protein: 30 mg/dL / Nitrite: Negative   Leuk Esterase: Negative / RBC: 10-25 /HPF / WBC 0-2 /HPF   Sq Epi: x / Non Sq Epi: Few /HPF / Bacteria: Few /HPF    RADIOLOGY & ADDITIONAL STUDIES:  < from: CT Abdomen and Pelvis w/ IV Cont (19 @ 16:07) >  IMPRESSION:    Multiple liver lesions consistent with metastases    large mass in the lower abdomen/upper pelvis surrounding the sigmoid   colon which may be secondary to just tumor, lymphoma or adenocarcinoma.   There is no bowel obstruction.    Results are discussed with Dr. Snow at 4:40 PM on 3/6/2019.    < end of copied text >

## 2019-03-07 NOTE — PROGRESS NOTE ADULT - PROBLEM SELECTOR PLAN 6
resume statin once patient able to tolerate PO  f/u lipid profile in AM hold off antihypertensives for now   BP getting better  continue to monitor BP

## 2019-03-08 DIAGNOSIS — R53.81 OTHER MALAISE: ICD-10-CM

## 2019-03-08 DIAGNOSIS — Z51.5 ENCOUNTER FOR PALLIATIVE CARE: ICD-10-CM

## 2019-03-08 DIAGNOSIS — E43 UNSPECIFIED SEVERE PROTEIN-CALORIE MALNUTRITION: ICD-10-CM

## 2019-03-08 LAB
ANION GAP SERPL CALC-SCNC: 6 MMOL/L — SIGNIFICANT CHANGE UP (ref 5–17)
BASOPHILS # BLD AUTO: 0.02 K/UL — SIGNIFICANT CHANGE UP (ref 0–0.2)
BASOPHILS NFR BLD AUTO: 0.2 % — SIGNIFICANT CHANGE UP (ref 0–2)
BUN SERPL-MCNC: 26 MG/DL — HIGH (ref 7–18)
CALCIUM SERPL-MCNC: 8.3 MG/DL — LOW (ref 8.4–10.5)
CANCER AG19-9 SERPL-ACNC: 33 U/ML — SIGNIFICANT CHANGE UP
CEA SERPL-MCNC: 2.6 NG/ML — SIGNIFICANT CHANGE UP (ref 0–3.8)
CHLORIDE SERPL-SCNC: 110 MMOL/L — HIGH (ref 96–108)
CHOLEST SERPL-MCNC: 76 MG/DL — SIGNIFICANT CHANGE UP (ref 10–199)
CO2 SERPL-SCNC: 24 MMOL/L — SIGNIFICANT CHANGE UP (ref 22–31)
CREAT SERPL-MCNC: 0.71 MG/DL — SIGNIFICANT CHANGE UP (ref 0.5–1.3)
CULTURE RESULTS: SIGNIFICANT CHANGE UP
CULTURE RESULTS: SIGNIFICANT CHANGE UP
EOSINOPHIL # BLD AUTO: 0.01 K/UL — SIGNIFICANT CHANGE UP (ref 0–0.5)
EOSINOPHIL NFR BLD AUTO: 0.1 % — SIGNIFICANT CHANGE UP (ref 0–6)
GLUCOSE SERPL-MCNC: 167 MG/DL — HIGH (ref 70–99)
HCT VFR BLD CALC: 27.6 % — LOW (ref 39–50)
HDLC SERPL-MCNC: 25 MG/DL — LOW
HGB BLD-MCNC: 8.8 G/DL — LOW (ref 13–17)
IMM GRANULOCYTES NFR BLD AUTO: 0.8 % — SIGNIFICANT CHANGE UP (ref 0–1.5)
INR BLD: 1.28 RATIO — HIGH (ref 0.88–1.16)
LIPID PNL WITH DIRECT LDL SERPL: 32 MG/DL — SIGNIFICANT CHANGE UP
LYMPHOCYTES # BLD AUTO: 0.65 K/UL — LOW (ref 1–3.3)
LYMPHOCYTES # BLD AUTO: 5.5 % — LOW (ref 13–44)
MAGNESIUM SERPL-MCNC: 2.3 MG/DL — SIGNIFICANT CHANGE UP (ref 1.6–2.6)
MCHC RBC-ENTMCNC: 24.9 PG — LOW (ref 27–34)
MCHC RBC-ENTMCNC: 31.9 GM/DL — LOW (ref 32–36)
MCV RBC AUTO: 78 FL — LOW (ref 80–100)
MONOCYTES # BLD AUTO: 0.49 K/UL — SIGNIFICANT CHANGE UP (ref 0–0.9)
MONOCYTES NFR BLD AUTO: 4.1 % — SIGNIFICANT CHANGE UP (ref 2–14)
NEUTROPHILS # BLD AUTO: 10.55 K/UL — HIGH (ref 1.8–7.4)
NEUTROPHILS NFR BLD AUTO: 89.3 % — HIGH (ref 43–77)
NRBC # BLD: 0 /100 WBCS — SIGNIFICANT CHANGE UP (ref 0–0)
ORGANISM # SPEC MICROSCOPIC CNT: SIGNIFICANT CHANGE UP
ORGANISM # SPEC MICROSCOPIC CNT: SIGNIFICANT CHANGE UP
PHOSPHATE SERPL-MCNC: 1.6 MG/DL — LOW (ref 2.5–4.5)
PLATELET # BLD AUTO: 167 K/UL — SIGNIFICANT CHANGE UP (ref 150–400)
POTASSIUM SERPL-MCNC: 3.5 MMOL/L — SIGNIFICANT CHANGE UP (ref 3.5–5.3)
POTASSIUM SERPL-SCNC: 3.5 MMOL/L — SIGNIFICANT CHANGE UP (ref 3.5–5.3)
PROCALCITONIN SERPL-MCNC: 5.68 NG/ML — HIGH (ref 0.02–0.1)
PROTHROM AB SERPL-ACNC: 14.3 SEC — HIGH (ref 10–12.9)
RBC # BLD: 3.54 M/UL — LOW (ref 4.2–5.8)
RBC # FLD: 15.5 % — HIGH (ref 10.3–14.5)
SODIUM SERPL-SCNC: 140 MMOL/L — SIGNIFICANT CHANGE UP (ref 135–145)
SPECIMEN SOURCE: SIGNIFICANT CHANGE UP
SPECIMEN SOURCE: SIGNIFICANT CHANGE UP
T4 FREE SERPL-MCNC: 1.2 NG/DL — SIGNIFICANT CHANGE UP (ref 0.9–1.8)
TOTAL CHOLESTEROL/HDL RATIO MEASUREMENT: 3 RATIO — LOW (ref 3.4–9.6)
TRIGL SERPL-MCNC: 93 MG/DL — SIGNIFICANT CHANGE UP (ref 10–149)
WBC # BLD: 11.82 K/UL — HIGH (ref 3.8–10.5)
WBC # FLD AUTO: 11.82 K/UL — HIGH (ref 3.8–10.5)

## 2019-03-08 PROCEDURE — 99222 1ST HOSP IP/OBS MODERATE 55: CPT

## 2019-03-08 PROCEDURE — 99497 ADVNCD CARE PLAN 30 MIN: CPT | Mod: 25

## 2019-03-08 PROCEDURE — 71250 CT THORAX DX C-: CPT | Mod: 26

## 2019-03-08 PROCEDURE — 99233 SBSQ HOSP IP/OBS HIGH 50: CPT | Mod: GC

## 2019-03-08 PROCEDURE — 99223 1ST HOSP IP/OBS HIGH 75: CPT

## 2019-03-08 PROCEDURE — 99232 SBSQ HOSP IP/OBS MODERATE 35: CPT

## 2019-03-08 RX ORDER — ACETAMINOPHEN 500 MG
650 TABLET ORAL EVERY 6 HOURS
Qty: 0 | Refills: 0 | Status: DISCONTINUED | OUTPATIENT
Start: 2019-03-08 | End: 2019-03-19

## 2019-03-08 RX ORDER — POTASSIUM PHOSPHATE, MONOBASIC POTASSIUM PHOSPHATE, DIBASIC 236; 224 MG/ML; MG/ML
15 INJECTION, SOLUTION INTRAVENOUS ONCE
Qty: 0 | Refills: 0 | Status: COMPLETED | OUTPATIENT
Start: 2019-03-08 | End: 2019-03-08

## 2019-03-08 RX ORDER — CHOLECALCIFEROL (VITAMIN D3) 125 MCG
1000 CAPSULE ORAL DAILY
Qty: 0 | Refills: 0 | Status: DISCONTINUED | OUTPATIENT
Start: 2019-03-08 | End: 2019-03-19

## 2019-03-08 RX ADMIN — Medication 1 APPLICATION(S): at 06:19

## 2019-03-08 RX ADMIN — Medication 100 MICROGRAM(S): at 06:19

## 2019-03-08 RX ADMIN — Medication 1 DROP(S): at 17:29

## 2019-03-08 RX ADMIN — TAMSULOSIN HYDROCHLORIDE 0.4 MILLIGRAM(S): 0.4 CAPSULE ORAL at 21:42

## 2019-03-08 RX ADMIN — CARBIDOPA AND LEVODOPA 1 TABLET(S): 25; 100 TABLET ORAL at 12:20

## 2019-03-08 RX ADMIN — PIPERACILLIN AND TAZOBACTAM 25 GRAM(S): 4; .5 INJECTION, POWDER, LYOPHILIZED, FOR SOLUTION INTRAVENOUS at 14:28

## 2019-03-08 RX ADMIN — Medication 1: at 12:19

## 2019-03-08 RX ADMIN — Medication 650 MILLIGRAM(S): at 01:30

## 2019-03-08 RX ADMIN — Medication 1000 UNIT(S): at 12:20

## 2019-03-08 RX ADMIN — POTASSIUM PHOSPHATE, MONOBASIC POTASSIUM PHOSPHATE, DIBASIC 62.5 MILLIMOLE(S): 236; 224 INJECTION, SOLUTION INTRAVENOUS at 12:19

## 2019-03-08 RX ADMIN — CARBIDOPA AND LEVODOPA 1 TABLET(S): 25; 100 TABLET ORAL at 06:19

## 2019-03-08 RX ADMIN — PIPERACILLIN AND TAZOBACTAM 25 GRAM(S): 4; .5 INJECTION, POWDER, LYOPHILIZED, FOR SOLUTION INTRAVENOUS at 21:42

## 2019-03-08 RX ADMIN — ENOXAPARIN SODIUM 40 MILLIGRAM(S): 100 INJECTION SUBCUTANEOUS at 12:21

## 2019-03-08 RX ADMIN — PIPERACILLIN AND TAZOBACTAM 25 GRAM(S): 4; .5 INJECTION, POWDER, LYOPHILIZED, FOR SOLUTION INTRAVENOUS at 06:19

## 2019-03-08 RX ADMIN — Medication 650 MILLIGRAM(S): at 00:43

## 2019-03-08 RX ADMIN — CARBIDOPA AND LEVODOPA 1 TABLET(S): 25; 100 TABLET ORAL at 17:29

## 2019-03-08 RX ADMIN — Medication 1 DROP(S): at 06:18

## 2019-03-08 RX ADMIN — Medication 1 APPLICATION(S): at 17:29

## 2019-03-08 NOTE — CONSULT NOTE ADULT - PROBLEM SELECTOR RECOMMENDATION 5
Met with the patient and his family at the bedside discussed his current clinical status and incidental finding on CT of colonic mass and possibility of malignancy. Met with the patient and his family at the bedside discussed his current clinical status and incidental finding on CT of colonic mass and possibility of malignancy.  Patient wants further work up to find out definitively.    Regarding goals of care, explained CPR, intubation and artificial feeds.    No CPR or artificial life support as per patient's wishes.  MOLST drafted for DNR/DNI.  Patient does not want PEG.  Role of health care proxy discussed. Patient identifies his son Omar Galloway as health care proxy.  Health care proxy documentation completed.  Will follow for clinical course and support conversations re: care planning as extent of disease burden is clarified.  All questions answered.  Support provided.    Advanced care planning time spent 20 minutes.

## 2019-03-08 NOTE — CONSULT NOTE ADULT - RS GEN PE MLT RESP DETAILS PC
no wheezes/no rales/breath sounds equal/good air movement/clear to auscultation bilaterally/no rhonchi

## 2019-03-08 NOTE — PROGRESS NOTE ADULT - PROBLEM SELECTOR PLAN 2
- has palpable mass in Rt. lower abdomen, mildly tender, had bloody bowel movement before 3 nights, now no bowel complaints  - CT abdomen showed Multiple liver lesions consistent with metastases and large mass in the lower abdomen/upper pelvis surrounding the sigmoid   colon which may be secondary to just tumor, lymphoma or adenocarcinoma.   - occult negative  - FU CEA, CA 19-9, CT chest  - GI Dr. Murdock consulted- advised sigmoidoscopy for diagnosis and biopsy on monday if family agrees  - Hem-onc Dr. Camargo consulted  - Palliative consulted

## 2019-03-08 NOTE — PROGRESS NOTE ADULT - PROBLEM SELECTOR PLAN 4
occult negative  iron profile shows anemia of chronic disease with elevated ferritin likely due to malignancy  transfuse if Hb <7

## 2019-03-08 NOTE — DIETITIAN INITIAL EVALUATION ADULT. - NS AS NUTRI INTERV MEALS SNACK
Fat - modified diet/Mineral - modified diet/change diet to Mechanical soft, carbohydrate consistent , DASH/TLC diet given diabetes

## 2019-03-08 NOTE — CONSULT NOTE ADULT - SUBJECTIVE AND OBJECTIVE BOX
HPI:  83M with PMH of HTN, HLD, hypothyroidism, CHF with preserved EF, Parkinson's disease, comes in by EMS after visiting phlebotomist called 911. Per chart, patient was noted to be very dehydrated, and very weak. Wife at bedside shares that patient has not been eating or drinking liquids for the past several days and has been having waxing/waning confusion for the past day. Patient has had decreased movement, however unable to bear weight. Pt has been able to whispering, however overall much more weak, has not taken medication for the past 2 days. ED physician on admission initiated goals of care however wife Initiated goals of care, but wife has not decided. Pt too sick to have discussion about goals of care at present, will defer until patient more improved. (06 Mar 2019 16:51)      PAST MEDICAL & SURGICAL HISTORY:  Hypothyroidism  Sacral decubitus ulcer, stage II  HLD (hyperlipidemia)  CHF (congestive heart failure)  Parkinson's disease  Hypertension  H/O hernia repair      SOCIAL HISTORY:    Admitted from:  home assisted living [facility]   Substance abuse history:              Tobacco hx:                  Alcohol hx:              Home Opioid hx:  Zoroastrian:                                    Preferred Language:    Surrogate/HCP/Guardian:            Phone#:    FAMILY HISTORY:  No pertinent family history in first degree relatives    Baseline ADLs (prior to admission):    Allergies    No Known Allergies    Intolerances      Present Symptoms:   Dyspnea:   Nausea/Vomiting:   Anxiety:  Depressed   Fatigue:  Loss of appetite:   Pain:                                location:          Review of Systems: [All others negative or Unable to obtain due to poor mentation]    MEDICATIONS  (STANDING):  artificial  tears Solution 1 Drop(s) Left EYE three times a day  carbidopa/levodopa  10/100 1 Tablet(s) Oral <User Schedule>  cholecalciferol 1000 Unit(s) Oral daily  dextrose 5% + sodium chloride 0.9%. 1000 milliLiter(s) (75 mL/Hr) IV Continuous <Continuous>  enoxaparin Injectable 40 milliGRAM(s) SubCutaneous daily  erythromycin   Ointment 1 Application(s) Right EYE two times a day  insulin lispro (HumaLOG) corrective regimen sliding scale   SubCutaneous three times a day before meals  levothyroxine 100 MICROGram(s) Oral daily  piperacillin/tazobactam IVPB. 3.375 Gram(s) IV Intermittent every 8 hours  potassium phosphate IVPB 15 milliMole(s) IV Intermittent once  tamsulosin 0.4 milliGRAM(s) Oral at bedtime    MEDICATIONS  (PRN):  acetaminophen   Tablet .. 650 milliGRAM(s) Oral every 6 hours PRN Temp greater or equal to 38C (100.4F)      PHYSICAL EXAM:    Vital Signs Last 24 Hrs  T(C): 36.3 (08 Mar 2019 07:36), Max: 38.2 (08 Mar 2019 00:42)  T(F): 97.4 (08 Mar 2019 07:36), Max: 100.8 (08 Mar 2019 00:42)  HR: 65 (08 Mar 2019 07:36) (60 - 69)  BP: 106/51 (08 Mar 2019 07:36) (105/55 - 108/53)  BP(mean): --  RR: 16 (08 Mar 2019 07:36) (16 - 16)  SpO2: 100% (08 Mar 2019 07:36) (97% - 100%)    General: alert  oriented x ____    [lethargic distressed cachexia  nonverbal  unarousable verbal]  Karnofsky Performance Score/Palliative Performance Status Version2:     %    HEENT: normal  dry mouth  ET tube/trach oral lesions:  Lungs: comfortable tachypnea/labored breathing  excessive secretions  CV: normal  tachycardia  GI: normal  distended  tender  incontinent               PEG/NG/OG tube  constipation  last BM:   : normal  incontinent  oliguria/anuria  travis  Musculoskeletal: normal  weakness  edema             ambulatory  bedbound/wheelchair bound  Skin: normal  pressure ulcers: stage: edema: other:  Neuro: no deficits cognitive impairment dsyphagia/dysarthria paresis: other:  Oral intake ability: unable/only mouth care [minimal moderate full capability]  Diet: [NPO]    LABS:                        8.8    11.82 )-----------( 167      ( 08 Mar 2019 06:16 )             27.6     03-08    140  |  110<H>  |  26<H>  ----------------------------<  167<H>  3.5   |  24  |  0.71    Ca    8.3<L>      08 Mar 2019 06:16  Phos  1.6     03-08  Mg     2.3     03-08    TPro  6.3  /  Alb  2.1<L>  /  TBili  1.2  /  DBili  x   /  AST  49<H>  /  ALT  47  /  AlkPhos  277<H>  03-06    Urinalysis Basic - ( 06 Mar 2019 16:48 )    Color: Yellow / Appearance: Clear / S.010 / pH: x  Gluc: x / Ketone: Small  / Bili: Negative / Urobili: Negative   Blood: x / Protein: 30 mg/dL / Nitrite: Negative   Leuk Esterase: Negative / RBC: 10-25 /HPF / WBC 0-2 /HPF   Sq Epi: x / Non Sq Epi: Few /HPF / Bacteria: Few /HPF        RADIOLOGY & ADDITIONAL STUDIES:    ADVANCE DIRECTIVES: HPI:  83M with PMH of HTN, HLD, hypothyroidism, CHF with preserved EF, Parkinson's disease, comes in by EMS after visiting phlebotomist called 911. Per chart, patient was noted to be very dehydrated, and very weak. Wife at bedside shares that patient has not been eating or drinking liquids for the past several days and has been having waxing/waning confusion for the past day. Patient has had decreased movement, however unable to bear weight. Pt has been able to whispering, however overall much more weak, has not taken medication for the past 2 days. ED physician on admission initiated goals of care however wife Initiated goals of care, but wife has not decided. Pt too sick to have discussion about goals of care at present, will defer until patient more improved. (06 Mar 2019 16:51)    Interval hx:       PAST MEDICAL & SURGICAL HISTORY:  Hypothyroidism  Sacral decubitus ulcer, stage II  HLD (hyperlipidemia)  CHF (congestive heart failure)  Parkinson's disease  Hypertension  H/O hernia repair      SOCIAL HISTORY:    Admitted from:  Lives at home with his wife      Surrogate/HCP/Guardian:   Omar Mackey (son)         Phone#:[phone]     FAMILY HISTORY:  No pertinent family history in first degree relatives    Baseline ADLs (prior to admission):  AOX3, independent    Allergies    No Known Allergies    Intolerances      Present Symptoms:   Dyspnea: denies  Nausea/Vomiting: denies  Anxiety: denies  Fatigue: denies  Loss of appetite: a little  Pain:    denies                                  Review of Systems: [All others negative     MEDICATIONS  (STANDING):  artificial  tears Solution 1 Drop(s) Left EYE three times a day  carbidopa/levodopa  10/100 1 Tablet(s) Oral <User Schedule>  cholecalciferol 1000 Unit(s) Oral daily  dextrose 5% + sodium chloride 0.9%. 1000 milliLiter(s) (75 mL/Hr) IV Continuous <Continuous>  enoxaparin Injectable 40 milliGRAM(s) SubCutaneous daily  erythromycin   Ointment 1 Application(s) Right EYE two times a day  insulin lispro (HumaLOG) corrective regimen sliding scale   SubCutaneous three times a day before meals  levothyroxine 100 MICROGram(s) Oral daily  piperacillin/tazobactam IVPB. 3.375 Gram(s) IV Intermittent every 8 hours  potassium phosphate IVPB 15 milliMole(s) IV Intermittent once  tamsulosin 0.4 milliGRAM(s) Oral at bedtime    MEDICATIONS  (PRN):  acetaminophen   Tablet .. 650 milliGRAM(s) Oral every 6 hours PRN Temp greater or equal to 38C (100.4F)      PHYSICAL EXAM:    Vital Signs Last 24 Hrs  T(C): 36.3 (08 Mar 2019 07:36), Max: 38.2 (08 Mar 2019 00:42)  T(F): 97.4 (08 Mar 2019 07:36), Max: 100.8 (08 Mar 2019 00:42)  HR: 65 (08 Mar 2019 07:36) (60 - 69)  BP: 106/51 (08 Mar 2019 07:36) (105/55 - 108/53)  BP(mean): --  RR: 16 (08 Mar 2019 07:36) (16 - 16)  SpO2: 100% (08 Mar 2019 07:36) (97% - 100%)    General: alert  oriented x _3__, NAD  Karnofsky Performance Score/Palliative Performance Status Version2: 30     %    HEENT: oropharynx clear  Lungs: unlabored  CV: S1S2, RRR  GI: mass RLQ, tender on palpation  : urinating  Musculoskeletal: no edema  Skin: no rash or lesions  Neuro: no deficits cognitive impairment   Oral intake ability: poor po intake  Diet: soft    LABS:                        8.8    11.82 )-----------( 167      ( 08 Mar 2019 06:16 )             27.6     03-08    140  |  110<H>  |  26<H>  ----------------------------<  167<H>  3.5   |  24  |  0.71    Ca    8.3<L>      08 Mar 2019 06:16  Phos  1.6     03-08  Mg     2.3     03-08    TPro  6.3  /  Alb  2.1<L>  /  TBili  1.2  /  DBili  x   /  AST  49<H>  /  ALT  47  /  AlkPhos  277<H>  03-06    Urinalysis Basic - ( 06 Mar 2019 16:48 )    Color: Yellow / Appearance: Clear / S.010 / pH: x  Gluc: x / Ketone: Small  / Bili: Negative / Urobili: Negative   Blood: x / Protein: 30 mg/dL / Nitrite: Negative   Leuk Esterase: Negative / RBC: 10-25 /HPF / WBC 0-2 /HPF   Sq Epi: x / Non Sq Epi: Few /HPF / Bacteria: Few /HPF        RADIOLOGY & ADDITIONAL STUDIES:  Reviewed    ADVANCE DIRECTIVES: MOLST: DNR/DNI HPI:  83M with PMH of HTN, HLD, hypothyroidism, CHF with preserved EF, Parkinson's disease, comes in by EMS after visiting phlebotomist called 911. Per chart, patient was noted to be very dehydrated, and very weak. Wife at bedside shares that patient has not been eating or drinking liquids for the past several days and has been having waxing/waning confusion for the past day. Patient has had decreased movement, however unable to bear weight. Pt has been able to whispering, however overall much more weak, has not taken medication for the past 2 days. ED physician on admission initiated goals of care however wife Initiated goals of care, but wife has not decided. Pt too sick to have discussion about goals of care at present, will defer until patient more improved. (06 Mar 2019 16:51)    Interval hx: Hospital course- incidental finding on  CT abdomen showed Multiple liver lesions consistent with metastases and large mass in the lower abdomen/upper pelvis surrounding the sigmoid colon which may be secondary to just tumor, lymphoma or adenocarcinoma.  Palliative care consulted to address GOC.      PAST MEDICAL & SURGICAL HISTORY:  Hypothyroidism  Sacral decubitus ulcer, stage II  HLD (hyperlipidemia)  CHF (congestive heart failure)  Parkinson's disease  Hypertension  H/O hernia repair      SOCIAL HISTORY:    Admitted from:  Lives at home with his wife      Surrogate/HCP/Guardian:   Omar Reddalysia (son)         Phone#:[phone]   Terell Galloway  (son)  Phone: [phone]  Mallory Galloway (wife)  Phone #[phone]    FAMILY HISTORY:  No pertinent family history in first degree relatives    Baseline ADLs (prior to admission):  AOX3, independent    Allergies    No Known Allergies    Intolerances      Present Symptoms:   Dyspnea: denies  Nausea/Vomiting: denies  Anxiety: denies  Fatigue: denies  Loss of appetite: a little  Pain:    denies                                  Review of Systems: [All others negative     MEDICATIONS  (STANDING):  artificial  tears Solution 1 Drop(s) Left EYE three times a day  carbidopa/levodopa  10/100 1 Tablet(s) Oral <User Schedule>  cholecalciferol 1000 Unit(s) Oral daily  dextrose 5% + sodium chloride 0.9%. 1000 milliLiter(s) (75 mL/Hr) IV Continuous <Continuous>  enoxaparin Injectable 40 milliGRAM(s) SubCutaneous daily  erythromycin   Ointment 1 Application(s) Right EYE two times a day  insulin lispro (HumaLOG) corrective regimen sliding scale   SubCutaneous three times a day before meals  levothyroxine 100 MICROGram(s) Oral daily  piperacillin/tazobactam IVPB. 3.375 Gram(s) IV Intermittent every 8 hours  potassium phosphate IVPB 15 milliMole(s) IV Intermittent once  tamsulosin 0.4 milliGRAM(s) Oral at bedtime    MEDICATIONS  (PRN):  acetaminophen   Tablet .. 650 milliGRAM(s) Oral every 6 hours PRN Temp greater or equal to 38C (100.4F)      PHYSICAL EXAM:    Vital Signs Last 24 Hrs  T(C): 36.3 (08 Mar 2019 07:36), Max: 38.2 (08 Mar 2019 00:42)  T(F): 97.4 (08 Mar 2019 07:36), Max: 100.8 (08 Mar 2019 00:42)  HR: 65 (08 Mar 2019 07:36) (60 - 69)  BP: 106/51 (08 Mar 2019 07:36) (105/55 - 108/53)  BP(mean): --  RR: 16 (08 Mar 2019 07:36) (16 - 16)  SpO2: 100% (08 Mar 2019 07:36) (97% - 100%)    General: alert  oriented x _3__, NAD  Karnofsky Performance Score/Palliative Performance Status Version2: 30     %    HEENT: oropharynx clear  Lungs: unlabored  CV: S1S2, RRR  GI: mass RLQ, tender on palpation  : urinating  Musculoskeletal: no edema  Skin: no rash or lesions  Neuro: no deficits cognitive impairment   Oral intake ability: poor po intake  Diet: soft    LABS:                        8.8    11.82 )-----------( 167      ( 08 Mar 2019 06:16 )             27.6     03-08    140  |  110<H>  |  26<H>  ----------------------------<  167<H>  3.5   |  24  |  0.71    Ca    8.3<L>      08 Mar 2019 06:16  Phos  1.6     03-08  Mg     2.3     03-08    TPro  6.3  /  Alb  2.1<L>  /  TBili  1.2  /  DBili  x   /  AST  49<H>  /  ALT  47  /  AlkPhos  277<H>  03-06    Urinalysis Basic - ( 06 Mar 2019 16:48 )    Color: Yellow / Appearance: Clear / S.010 / pH: x  Gluc: x / Ketone: Small  / Bili: Negative / Urobili: Negative   Blood: x / Protein: 30 mg/dL / Nitrite: Negative   Leuk Esterase: Negative / RBC: 10-25 /HPF / WBC 0-2 /HPF   Sq Epi: x / Non Sq Epi: Few /HPF / Bacteria: Few /HPF        RADIOLOGY & ADDITIONAL STUDIES:  Reviewed    ADVANCE DIRECTIVES: MOLST: DNR/DNI HPI:  83M with PMH of HTN, HLD, hypothyroidism, CHF with preserved EF, Parkinson's disease, comes in by EMS after visiting phlebotomist called 911. Per chart, patient was noted to be very dehydrated, and very weak. Wife at bedside shares that patient has not been eating or drinking liquids for the past several days and has been having waxing/waning confusion for the past day. Patient has had decreased movement, however unable to bear weight. Pt has been able to whispering, however overall much more weak, has not taken medication for the past 2 days. ED physician on admission initiated goals of care however wife Initiated goals of care, but wife has not decided. Pt too sick to have discussion about goals of care at present, will defer until patient more improved. (06 Mar 2019 16:51)    Interval hx: Hospital course- incidental finding on  CT abdomen showed Multiple liver lesions consistent with metastases and large mass in the lower abdomen/upper pelvis surrounding the sigmoid colon which may be secondary to just tumor, lymphoma or adenocarcinoma.  Palliative care consulted to address GOC.      PAST MEDICAL & SURGICAL HISTORY:  Hypothyroidism  Sacral decubitus ulcer, stage II  HLD (hyperlipidemia)  CHF (congestive heart failure)  Parkinson's disease  Hypertension  H/O hernia repair      SOCIAL HISTORY:    Admitted from:  Lives at home with his wife      Surrogate/HCP/Guardian:   Omar Reddalysia (son)         Phone#:[phone]   Terell Galloway  (son)  Phone: [phone]  Mallory Galloway (wife)  Phone #[phone]    FAMILY HISTORY:  No pertinent family history in first degree relatives    Baseline ADLs (prior to admission):  AOX3, ambulated with cane, able to manage ADL's    Allergies    No Known Allergies    Intolerances      Present Symptoms:   Dyspnea: denies  Nausea/Vomiting: denies  Anxiety: denies  Fatigue: denies  Loss of appetite: a little  Pain:    denies                                  Review of Systems: [All others negative     MEDICATIONS  (STANDING):  artificial  tears Solution 1 Drop(s) Left EYE three times a day  carbidopa/levodopa  10/100 1 Tablet(s) Oral <User Schedule>  cholecalciferol 1000 Unit(s) Oral daily  dextrose 5% + sodium chloride 0.9%. 1000 milliLiter(s) (75 mL/Hr) IV Continuous <Continuous>  enoxaparin Injectable 40 milliGRAM(s) SubCutaneous daily  erythromycin   Ointment 1 Application(s) Right EYE two times a day  insulin lispro (HumaLOG) corrective regimen sliding scale   SubCutaneous three times a day before meals  levothyroxine 100 MICROGram(s) Oral daily  piperacillin/tazobactam IVPB. 3.375 Gram(s) IV Intermittent every 8 hours  potassium phosphate IVPB 15 milliMole(s) IV Intermittent once  tamsulosin 0.4 milliGRAM(s) Oral at bedtime    MEDICATIONS  (PRN):  acetaminophen   Tablet .. 650 milliGRAM(s) Oral every 6 hours PRN Temp greater or equal to 38C (100.4F)      PHYSICAL EXAM:    Vital Signs Last 24 Hrs  T(C): 36.3 (08 Mar 2019 07:36), Max: 38.2 (08 Mar 2019 00:42)  T(F): 97.4 (08 Mar 2019 07:36), Max: 100.8 (08 Mar 2019 00:42)  HR: 65 (08 Mar 2019 07:36) (60 - 69)  BP: 106/51 (08 Mar 2019 07:36) (105/55 - 108/53)  BP(mean): --  RR: 16 (08 Mar 2019 07:36) (16 - 16)  SpO2: 100% (08 Mar 2019 07:36) (97% - 100%)    General: alert  oriented x _3__, NAD  Karnofsky Performance Score/Palliative Performance Status Version2: 30     %    HEENT: oropharynx clear  Lungs: unlabored  CV: S1S2, RRR  GI: mass RLQ, tender on palpation  : urinating  Musculoskeletal: no edema  Skin: no rash or lesions  Neuro: no deficits cognitive impairment   Oral intake ability: poor po intake  Diet: soft    LABS:                        8.8    11.82 )-----------( 167      ( 08 Mar 2019 06:16 )             27.6     03-08    140  |  110<H>  |  26<H>  ----------------------------<  167<H>  3.5   |  24  |  0.71    Ca    8.3<L>      08 Mar 2019 06:16  Phos  1.6     03-08  Mg     2.3     03-08    TPro  6.3  /  Alb  2.1<L>  /  TBili  1.2  /  DBili  x   /  AST  49<H>  /  ALT  47  /  AlkPhos  277<H>  03-06    Urinalysis Basic - ( 06 Mar 2019 16:48 )    Color: Yellow / Appearance: Clear / S.010 / pH: x  Gluc: x / Ketone: Small  / Bili: Negative / Urobili: Negative   Blood: x / Protein: 30 mg/dL / Nitrite: Negative   Leuk Esterase: Negative / RBC: 10-25 /HPF / WBC 0-2 /HPF   Sq Epi: x / Non Sq Epi: Few /HPF / Bacteria: Few /HPF        RADIOLOGY & ADDITIONAL STUDIES:  Reviewed    ADVANCE DIRECTIVES: MOLST: DNR/DNI HPI:  83M with PMH of HTN, HLD, hypothyroidism, CHF with preserved EF, Parkinson's disease, comes in by EMS after visiting phlebotomist called 911. Per chart, patient was noted to be very dehydrated, and very weak. Wife at bedside shares that patient has not been eating or drinking liquids for the past several days and has been having waxing/waning confusion for the past day. Patient has had decreased movement, however unable to bear weight. Pt has been able to whispering, however overall much more weak, has not taken medication for the past 2 days. ED physician on admission initiated goals of care however wife Initiated goals of care, but wife has not decided. Pt too sick to have discussion about goals of care at present, will defer until patient more improved. (06 Mar 2019 16:51)    Interval hx: Hospital course- incidental finding on  CT abdomen showed Multiple liver lesions consistent with metastases and large mass in the lower abdomen/upper pelvis surrounding the sigmoid colon which may be secondary to just tumor, lymphoma or adenocarcinoma.  Palliative care consulted to address GOC.      PAST MEDICAL & SURGICAL HISTORY:  Hypothyroidism  Sacral decubitus ulcer, stage II  HLD (hyperlipidemia)  CHF (congestive heart failure)  Parkinson's disease  Hypertension  H/O hernia repair      SOCIAL HISTORY:    Admitted from:  Lives at home with his wife    HCP:   Omar Key (son)         Phone#:[phone]   HCP:      Terell Laverne  (son)  Phone: [phone]  Mallory Galloway (wife)  Phone #[phone]    FAMILY HISTORY:  No pertinent family history in first degree relatives    Baseline ADLs (prior to admission):  AOX3, ambulated with cane, able to manage ADL's    Allergies    No Known Allergies    Intolerances      Present Symptoms:   Dyspnea: denies  Nausea/Vomiting: denies  Anxiety: denies  Fatigue: denies  Loss of appetite: a little  Pain:    denies                                  Review of Systems: [All others negative     MEDICATIONS  (STANDING):  artificial  tears Solution 1 Drop(s) Left EYE three times a day  carbidopa/levodopa  10/100 1 Tablet(s) Oral <User Schedule>  cholecalciferol 1000 Unit(s) Oral daily  dextrose 5% + sodium chloride 0.9%. 1000 milliLiter(s) (75 mL/Hr) IV Continuous <Continuous>  enoxaparin Injectable 40 milliGRAM(s) SubCutaneous daily  erythromycin   Ointment 1 Application(s) Right EYE two times a day  insulin lispro (HumaLOG) corrective regimen sliding scale   SubCutaneous three times a day before meals  levothyroxine 100 MICROGram(s) Oral daily  piperacillin/tazobactam IVPB. 3.375 Gram(s) IV Intermittent every 8 hours  potassium phosphate IVPB 15 milliMole(s) IV Intermittent once  tamsulosin 0.4 milliGRAM(s) Oral at bedtime    MEDICATIONS  (PRN):  acetaminophen   Tablet .. 650 milliGRAM(s) Oral every 6 hours PRN Temp greater or equal to 38C (100.4F)      PHYSICAL EXAM:    Vital Signs Last 24 Hrs  T(C): 36.3 (08 Mar 2019 07:36), Max: 38.2 (08 Mar 2019 00:42)  T(F): 97.4 (08 Mar 2019 07:36), Max: 100.8 (08 Mar 2019 00:42)  HR: 65 (08 Mar 2019 07:36) (60 - 69)  BP: 106/51 (08 Mar 2019 07:36) (105/55 - 108/53)  BP(mean): --  RR: 16 (08 Mar 2019 07:36) (16 - 16)  SpO2: 100% (08 Mar 2019 07:36) (97% - 100%)    General: alert  oriented x _3__, NAD  Karnofsky Performance Score/Palliative Performance Status Version2: 30     %    HEENT: oropharynx clear  Lungs: unlabored  CV: S1S2, RRR  GI: mass RLQ, tender on palpation  : urinating  Musculoskeletal: no edema  Skin: no rash or lesions  Neuro: no deficits cognitive impairment   Oral intake ability: poor po intake  Diet: soft    LABS:                        8.8    11.82 )-----------( 167      ( 08 Mar 2019 06:16 )             27.6     03-08    140  |  110<H>  |  26<H>  ----------------------------<  167<H>  3.5   |  24  |  0.71    Ca    8.3<L>      08 Mar 2019 06:16  Phos  1.6     03-08  Mg     2.3     03-08    TPro  6.3  /  Alb  2.1<L>  /  TBili  1.2  /  DBili  x   /  AST  49<H>  /  ALT  47  /  AlkPhos  277<H>  03-06    Urinalysis Basic - ( 06 Mar 2019 16:48 )    Color: Yellow / Appearance: Clear / S.010 / pH: x  Gluc: x / Ketone: Small  / Bili: Negative / Urobili: Negative   Blood: x / Protein: 30 mg/dL / Nitrite: Negative   Leuk Esterase: Negative / RBC: 10-25 /HPF / WBC 0-2 /HPF   Sq Epi: x / Non Sq Epi: Few /HPF / Bacteria: Few /HPF        RADIOLOGY & ADDITIONAL STUDIES:  Reviewed    ADVANCE DIRECTIVES: MOLST: DNR/DNI

## 2019-03-08 NOTE — CONSULT NOTE ADULT - ASSESSMENT
Bacteremia - appears to bacteroides as both are growing in the anaerobic bottles, probably from mass eroding colonic mucosa  fevers  Leukocytosis    plan - cont zosyn 3.375 gms iv q8 hrs  will be able to do biopsy on Monday onwards.

## 2019-03-08 NOTE — PROGRESS NOTE ADULT - ATTENDING COMMENTS
Patient seen/evaluated at bedside 3/8/3019. I agree with the resident progress note/outlined plan of care. My independent findings and conclusions are documented.    Pt much more awake and interactive today, though still clearly fatigued and debilitated. Pt denies nausea/vomiting, abdominal pain. Had low grade fever to 100.8 overnight. 2 sons and spouse at bedside with update provided and weekend plan of care provided. They are aware of planned limited sigmoidoscopy on Monday.  Pt is now DNR/DNI    PE: Soft spoken, Awake, alert chronically ill appearing  S1S2 RRR  CTAB/l no accessory muscle use  soft, NT, ND, + BS  significant for mild RUQ and bilateral lower quadrant tenderness on palpation    1. Sepsis present on admission (with hypothermia, hypotension)  2. Gram negative bacteremia  3. acute infectious encephalopathy  4. Sigmoid mass with suspected metastatic disease  5. debility/ deconditioning  6. hypothyroidism  7. DM T II  8. Parkinson's Disease    zosyn, ID consult, f/u ID and sensitivities of cultures--> currently w/ 4/4 bottles of gram negative rods, highly suspect colonic mass as source   CEA not elevated. Tentatively planned for liver bx on Tuesday pending clearance of bacteremia  s/p IV synthroid and dose of steroids in the ED  repeat blood cultures to be drawn tomorrow  appreciate discussion with hematology-oncology and palliative care  input and management from GI and general surgery noted, planned for limited sigmoidoscopy on Monday  c/w home dose of synthroid and sinemet  dysphagia diet  dvt ppx, fall precautions with bed alarm  rest of plan as above

## 2019-03-08 NOTE — DIETITIAN INITIAL EVALUATION ADULT. - MD RECOMMEND
change diet to Mechanical soft, carbohydrate consistent , DASH/TLC diet given diabetes/po supplement po supplement/change diet to Mechanical soft, carbohydrate consistent , DASH/TLC diet given diabetes, add multivitamin/minerals 1 tab/d and vitamin C 500mg bid to assist with healing

## 2019-03-08 NOTE — PROGRESS NOTE ADULT - PROBLEM SELECTOR PLAN 1
- p/w hypothermia and hypotension, s/p code sepsis  - mild leukocytosis( also could be 2/2 steroid) trending down, febrile Tmax 100.8  - blood culture growing gm -ve bacteria, FU final result  - on zosyn D2  - UA and CXR, CT head negative  - will send repeat blood culture in AM  - ID Dr. Artis

## 2019-03-08 NOTE — PROGRESS NOTE ADULT - PROBLEM SELECTOR PLAN 3
p/w hypothermia of  temp of 94.7 rectally and hypotension, likely 2/2 hypothyroid state precipitated by missed dose and malignancy vs sepsis  s/p Hydrocortisone 50 mg IV x 1 dose then IV synthroid 50 microgm,  c/w synthroid 100mcg daily PO, (home dose)  pt. missed dose for 4 days  TSH within normal limits  normal free T4

## 2019-03-08 NOTE — CONSULT NOTE ADULT - PROBLEM SELECTOR RECOMMENDATION 2
CT showing large mass in the lower abdomen/upper pelvis surrounding the sigmoid colon. Will need family discussion with patient and family to decide goals of care.
No behavioral issues presently. On Sinemet

## 2019-03-08 NOTE — PROGRESS NOTE ADULT - SUBJECTIVE AND OBJECTIVE BOX
Subjective:   Patient offers no complaints.     Objective:    MEDICATIONS  (STANDING):  artificial  tears Solution 1 Drop(s) Left EYE three times a day  carbidopa/levodopa  10/100 1 Tablet(s) Oral <User Schedule>  cholecalciferol 1000 Unit(s) Oral daily  dextrose 5% + sodium chloride 0.9%. 1000 milliLiter(s) (75 mL/Hr) IV Continuous <Continuous>  enoxaparin Injectable 40 milliGRAM(s) SubCutaneous daily  erythromycin   Ointment 1 Application(s) Right EYE two times a day  insulin lispro (HumaLOG) corrective regimen sliding scale   SubCutaneous three times a day before meals  iron sucrose IVPB 200 milliGRAM(s) IV Intermittent every 24 hours  levothyroxine 100 MICROGram(s) Oral daily  piperacillin/tazobactam IVPB. 3.375 Gram(s) IV Intermittent every 8 hours  potassium phosphate IVPB 15 milliMole(s) IV Intermittent once  tamsulosin 0.4 milliGRAM(s) Oral at bedtime    MEDICATIONS  (PRN):  acetaminophen   Tablet .. 650 milliGRAM(s) Oral every 6 hours PRN Temp greater or equal to 38C (100.4F)              Vital Signs Last 24 Hrs  T(C): 36.3 (08 Mar 2019 07:36), Max: 38.2 (08 Mar 2019 00:42)  T(F): 97.4 (08 Mar 2019 07:36), Max: 100.8 (08 Mar 2019 00:42)  HR: 65 (08 Mar 2019 07:36) (60 - 69)  BP: 106/51 (08 Mar 2019 07:36) (105/55 - 108/53)  BP(mean): --  RR: 16 (08 Mar 2019 07:36) (16 - 16)  SpO2: 100% (08 Mar 2019 07:36) (97% - 100%)      General:  Well developed, well nourished, alert and active, no pallor, NAD.  HEENT:    Normal appearance of conjunctiva, ears, nose, lips, oropharynx, and oral mucosa, anicteric.  Neck:  No masses, no asymmetry.  Lymph Nodes:  No lymphadenopathy.   Cardiovascular:  RRR normal S1/S2, no murmur.  Respiratory:  CTA B/L, normal respiratory effort.   Abdominal:   soft, no masses or tenderness, normoactive BS, NT/ND, no HSM.  Extremities:   No clubbing or cyanosis, normal capillary refill, no edema.   Skin:   No rash, jaundice, lesions, eczema.   Musculoskeletal:  No joint swelling, erythema or tenderness.   Neuro: No focal deficits.   Other:       LABS:                        8.8    11.82 )-----------( 167      ( 08 Mar 2019 06:16 )             27.6     03-08    140  |  110<H>  |  26<H>  ----------------------------<  167<H>  3.5   |  24  |  0.71    Ca    8.3<L>      08 Mar 2019 06:16  Phos  1.6     03-08  Mg     2.3     -08    TPro  6.3  /  Alb  2.1<L>  /  TBili  1.2  /  DBili  x   /  AST  49<H>  /  ALT  47  /  AlkPhos  277<H>  03-06    PT/INR - ( 08 Mar 2019 06:16 )   PT: 14.3 sec;   INR: 1.28 ratio           Urinalysis Basic - ( 06 Mar 2019 16:48 )    Color: Yellow / Appearance: Clear / S.010 / pH: x  Gluc: x / Ketone: Small  / Bili: Negative / Urobili: Negative   Blood: x / Protein: 30 mg/dL / Nitrite: Negative   Leuk Esterase: Negative / RBC: 10-25 /HPF / WBC 0-2 /HPF   Sq Epi: x / Non Sq Epi: Few /HPF / Bacteria: Few /HPF        RADIOLOGY & ADDITIONAL TESTS:    EXAM:  CT ABDOMEN AND PELVIS IC                            *** ADDENDUM 2019  ***    This addendum is performed to correct a typographical error in the   report. The differential diagnosis includes Gist tumor.      *** END OF ADDENDUM 2019  ***      PROCEDURE DATE:  2019          INTERPRETATION:  CLINICAL STATEMENT: r/o colitis    TECHNIQUE: CT of the abdomen and pelvis was performed with IV contrast.   Oral contrast was administered. Approximately 90 cc of Omnipaque 350   administered. The study is limited by patient's inability to cooperate or   raise the arms above the head    COMPARISON: None.    FINDINGS:    The lower chest is unremarkable.    The liver is remarkable for a numerous hypodense lesions consistent with   metastatic disease. The fluid-filled gallbladder is appreciated.    The spleen, pancreas and adrenal glands are unremarkable.The kidneys are   unremarkable. The fluid-filled bladder appears intact.    There is a large heterogeneous mass in the lower abdomen measuring 2.5 cm   transverse x 9.2 cm AP x 8.8 cm in length. This mass circumferentially   envelops the sigmoid colon without intraluminal extension or obstruction.   Differential diagnosis includes lymphoma, just tumor or adenocarcinoma.    There is no intraperitoneal free air.  There is no free fluid. The aorta   is not aneurysmal.    There is no significant abdominal, retroperitoneal or pelvic   lymphadenopathy. The pelvic structures demonstrate enlarged prostate   gland.    The osseous structures demonstrate degenerative changes of the spine and   right total hip replacement.    IMPRESSION:    Multiple liver lesions consistent with metastases    large mass in the lower abdomen/upper pelvis surrounding the sigmoid   colon which may be secondary to just tumor, lymphoma or adenocarcinoma.   There is no bowel obstruction.    Results are discussed with Dr. Snow at 4:40 PM on 3/6/2019.      ***Please see the addendum at the top of this report. It may contain   additional important information or changes.****          TIFF CARLISLE M.D.,ATTENDING RADIOLOGIST  This document has been electronically signed. Mar  6 2019  4:40PM  Addend:  TIFF CARLISLE M.D.,ATTENDING RADIOLOGIST  This addendum was electronically signed on: Mar  7 2019  4:52PM.

## 2019-03-08 NOTE — CONSULT NOTE ADULT - PROBLEM SELECTOR RECOMMENDATION 3
monitor K and replete lyte prn
Patient reports eating well prior to admission.  Now has poor appetite.  Diet as tolerated.  Nutrition eval noted.

## 2019-03-08 NOTE — DIETITIAN INITIAL EVALUATION ADULT. - NUTRITION INTERVENTION
Meals and Snack/Medical Food Supplements/Collaboration and Referral of Nutrition Care Medical Food Supplements/Vitamin/Meals and Snack/Collaboration and Referral of Nutrition Care

## 2019-03-08 NOTE — CONSULT NOTE ADULT - SUBJECTIVE AND OBJECTIVE BOX
83M with PMH of HTN, HLD, hypothyroidism, CHF with preserved EF, Parkinson's disease, BPH, nocturia who presents to ED with dehydration and weakness. Wife at bedside shares that patient has not been eating or drinking liquids for the past several days and has been having waxing/waning confusion for the past day. Patient has had decreased movement, however unable to bear weight. Pt has been much more weak, has not taken medication for the past 2 days. Reports that he is urinating at baseline,  No difficulty urinating.  CT shows large abdominal mass.    PAST MEDICAL & SURGICAL HISTORY:  Hypothyroidism  Sacral decubitus ulcer, stage II  HLD (hyperlipidemia)  CHF (congestive heart failure)  Parkinson's disease  Hypertension  H/O hernia repair    Family History: No family history of  malignancy  Social History: Does not smoke    ROS: All others negative except as noted above.    Home Medications:  aspirin 81 mg oral tablet: 1 tab(s) orally once a day (06 Mar 2019 18:17)  atenolol 25 mg oral tablet: 1 tab(s) orally once a day (06 Mar 2019 18:17)  Flomax 0.4 mg oral capsule: 1 cap(s) orally once a day (06 Mar 2019 18:17)  levothyroxine 100 mcg (0.1 mg) oral tablet: 1 tab(s) orally once a day (06 Mar 2019 18:17)  losartan 100 mg oral tablet: 1 tab(s) orally once a day (06 Mar 2019 18:17)  Sinemet 10 mg-100 mg oral tablet: 1 tab(s) orally 3 times a day (06 Mar 2019 18:17)    Allergies    No Known Allergies                          8.8    11.82 )-----------( 167      ( 08 Mar 2019 06:16 )             27.6   03-08    140  |  110<H>  |  26<H>  ----------------------------<  167<H>  3.5   |  24  |  0.71    Ca    8.3<L>      08 Mar 2019 06:16  Phos  1.6     03-08  Mg     2.3     03-08    ICU Vital Signs Last 24 Hrs  T(C): 37.2 (08 Mar 2019 15:54), Max: 38.2 (08 Mar 2019 00:42)  T(F): 99 (08 Mar 2019 15:54), Max: 100.8 (08 Mar 2019 00:42)  HR: 66 (08 Mar 2019 15:54) (65 - 69)  BP: 110/54 (08 Mar 2019 15:54) (106/51 - 110/54)  BP(mean): --  ABP: --  ABP(mean): --  RR: 16 (08 Mar 2019 15:54) (16 - 16)  SpO2: 97% (08 Mar 2019 15:54) (97% - 100%)      EXAM:  CT ABDOMEN AND PELVIS IC                            *** ADDENDUM 03/07/2019  ***    This addendum is performed to correct a typographical error in the   report. The differential diagnosis includes Gist tumor.      *** END OF ADDENDUM 03/07/2019  ***      PROCEDURE DATE:  03/06/2019          INTERPRETATION:  CLINICAL STATEMENT: r/o colitis    TECHNIQUE: CT of the abdomen and pelvis was performed with IV contrast.   Oral contrast was administered. Approximately 90 cc of Omnipaque 350   administered. The study is limited by patient's inability to cooperate or   raise the arms above the head    COMPARISON: None.    FINDINGS:    The lower chest is unremarkable.    The liver is remarkable for a numerous hypodense lesions consistent with   metastatic disease. The fluid-filled gallbladder is appreciated.    The spleen, pancreas and adrenal glands are unremarkable.The kidneys are   unremarkable. The fluid-filled bladder appears intact.    There is a large heterogeneous mass in the lower abdomen measuring 2.5 cm   transverse x 9.2 cm AP x 8.8 cm in length. This mass circumferentially   envelops the sigmoid colon without intraluminal extension or obstruction.   Differential diagnosis includes lymphoma, just tumor or adenocarcinoma.    There is no intraperitoneal free air.  There is no free fluid. The aorta   is not aneurysmal.    There is no significant abdominal, retroperitoneal or pelvic   lymphadenopathy. The pelvic structures demonstrate enlarged prostate   gland.    The osseousstructures demonstrate degenerative changes of the spine and   right total hip replacement.    IMPRESSION:    Multiple liver lesions consistent with metastases    large mass in the lower abdomen/upper pelvis surrounding the sigmoid   colon which may be secondary to just tumor, lymphoma or adenocarcinoma.   There is no bowel obstruction.    Results are discussed with Dr. Sonw at 4:40 PM on 3/6/2019.      ***Please see the addendum at the top of this report. It may contain   additional important information or changes.****          TIFF CARLISLE M.D.,ATTENDING RADIOLOGIST  This document has been electronically signed. Mar  6 2019  4:40PM  Addend:  TIFF CARLISLE M.D.,ATTENDING RADIOLOGIST  This addendum was electronically signed on: Mar  7 43989:52PM.

## 2019-03-08 NOTE — PROGRESS NOTE ADULT - PROBLEM SELECTOR PLAN 7
not in exacerbation  patient on atenolol at home, will hold for now in setting of hypotension  normal echo with GII DD

## 2019-03-08 NOTE — PROGRESS NOTE ADULT - ASSESSMENT
Patient admitted for fatigue, increased lethargy, suspected sepsis given hypothermia and clinical condition, s/p Iv fluids and Zosyn in ED, and treated for suspected hypothyroid state, patient also noted to have colonic mass with multiple masses in liver, suspected for liver metastasis. Blood culture grew gm -ve bacteria, pending final result.

## 2019-03-08 NOTE — CONSULT NOTE ADULT - PROBLEM SELECTOR RECOMMENDATION 9
palpable mass in Rt. lower abdomen, mildly tender on palpation, had bloody bowel movements at home.    - CT abdomen showed Multiple liver lesions consistent with metastases and large mass in the lower abdomen/upper pelvis surrounding the sigmoid colon which may be secondary to just tumor, lymphoma or adenocarcinoma.  Family agreeable for biopsy. palpable mass in Rt. lower abdomen, mildly tender on palpation, had 1 bloody bowel movements at home.    - CT abdomen showed Multiple liver lesions consistent with metastases and large mass in the lower abdomen/upper pelvis surrounding the sigmoid colon which may be secondary to just tumor, lymphoma or adenocarcinoma.    Family agreeable for biopsy.  Once extent of disease is known, more detailed discussion re: treatment/ further goals of care will be needed.

## 2019-03-08 NOTE — CONSULT NOTE ADULT - SUBJECTIVE AND OBJECTIVE BOX
HPI:  83M with PMH of HTN, HLD, hypothyroidism, CHF with preserved EF, Parkinson's disease, comes in by EMS after visiting phlebotomist called 911. Per chart, patient was noted to be very dehydrated, and very weak. Wife at bedside shares that patient has not been eating or drinking liquids for the past several days and has been having waxing/waning confusion for the past day. Patient has had decreased movement, however unable to bear weight. Pt has been able to whispering, however overall much more weak, has not taken medication for the past 2 days. ED physician on admission initiated goals of care however wife Initiated goals of care, but wife has not decided. Pt too sick to have discussion about goals of care at present, will defer until patient more improved. (06 Mar 2019 16:51)      PAST MEDICAL & SURGICAL HISTORY:  Hypothyroidism  Sacral decubitus ulcer, stage II  HLD (hyperlipidemia)  CHF (congestive heart failure)  Parkinson's disease  Hypertension  H/O hernia repair      No Known Allergies      Meds:  acetaminophen   Tablet .. 650 milliGRAM(s) Oral every 6 hours PRN  artificial  tears Solution 1 Drop(s) Left EYE three times a day  carbidopa/levodopa  10/100 1 Tablet(s) Oral <User Schedule>  cholecalciferol 1000 Unit(s) Oral daily  dextrose 5% + sodium chloride 0.9%. 1000 milliLiter(s) IV Continuous <Continuous>  enoxaparin Injectable 40 milliGRAM(s) SubCutaneous daily  erythromycin   Ointment 1 Application(s) Right EYE two times a day  insulin lispro (HumaLOG) corrective regimen sliding scale   SubCutaneous three times a day before meals  levothyroxine 100 MICROGram(s) Oral daily  piperacillin/tazobactam IVPB. 3.375 Gram(s) IV Intermittent every 8 hours  tamsulosin 0.4 milliGRAM(s) Oral at bedtime      SOCIAL HISTORY:  Smoker:  YES / NO        PACK YEARS:                         WHEN QUIT?  ETOH use:  YES / NO               FREQUENCY / QUANTITY:  Ilicit Drug use:  YES / NO  Occupation:  Assisted device use (Cane / Walker):  Live with:    FAMILY HISTORY:  No pertinent family history in first degree relatives      VITALS:  Vital Signs Last 24 Hrs  T(C): 36.3 (08 Mar 2019 07:36), Max: 38.2 (08 Mar 2019 00:42)  T(F): 97.4 (08 Mar 2019 07:36), Max: 100.8 (08 Mar 2019 00:42)  HR: 65 (08 Mar 2019 07:36) (65 - 69)  BP: 106/51 (08 Mar 2019 07:36) (106/51 - 108/53)  BP(mean): --  RR: 16 (08 Mar 2019 07:36) (16 - 16)  SpO2: 100% (08 Mar 2019 07:36) (97% - 100%)    LABS/DIAGNOSTIC TESTS:                          8.8    11.82 )-----------( 167      ( 08 Mar 2019 06:16 )             27.6     WBC Count: 11.82 K/uL ( @ 06:16)  WBC Count: 14.29 K/uL ( @ 06:57)  WBC Count: 11.84 K/uL ( @ 14:24)          140  |  110<H>  |  26<H>  ----------------------------<  167<H>  3.5   |  24  |  0.71    Ca    8.3<L>      08 Mar 2019 06:16  Phos  1.6       Mg     2.3         TPro  6.3  /  Alb  2.1<L>  /  TBili  1.2  /  DBili  x   /  AST  49<H>  /  ALT  47  /  AlkPhos  277<H>        Urinalysis Basic - ( 06 Mar 2019 16:48 )    Color: Yellow / Appearance: Clear / S.010 / pH: x  Gluc: x / Ketone: Small  / Bili: Negative / Urobili: Negative   Blood: x / Protein: 30 mg/dL / Nitrite: Negative   Leuk Esterase: Negative / RBC: 10-25 /HPF / WBC 0-2 /HPF   Sq Epi: x / Non Sq Epi: Few /HPF / Bacteria: Few /HPF        LIVER FUNCTIONS - ( 06 Mar 2019 14:24 )  Alb: 2.1 g/dL / Pro: 6.3 g/dL / ALK PHOS: 277 U/L / ALT: 47 U/L DA / AST: 49 U/L / GGT: x             PT/INR - ( 08 Mar 2019 06:16 )   PT: 14.3 sec;   INR: 1.28 ratio             LACTATE:    ABG -     CULTURES:   .Urine   @ 00:49   No growth  --  --      .Blood   @ 00:36   Growth in anaerobic bottle: Bacteroides fragilis "Susceptibilities not  performed"        RADIOLOGY:  < from: CT Abdomen and Pelvis w/ IV Cont (19 @ 16:07) >  EXAM:  CT ABDOMEN AND PELVIS IC                            *** ADDENDUM 2019  ***    This addendum is performed to correct a typographical error in the   report. The differential diagnosis includes Gist tumor.      *** END OF ADDENDUM 2019  ***      PROCEDURE DATE:  2019          INTERPRETATION:  CLINICAL STATEMENT: r/o colitis    TECHNIQUE: CT of the abdomen and pelvis was performed with IV contrast.   Oral contrast was administered. Approximately 90 cc of Omnipaque 350   administered. The study is limited by patient's inability to cooperate or   raise the arms above the head    COMPARISON: None.    FINDINGS:    The lower chest is unremarkable.    The liver is remarkable for a numerous hypodense lesions consistent with   metastatic disease. The fluid-filled gallbladder is appreciated.    The spleen, pancreas and adrenal glands are unremarkable.The kidneys are   unremarkable. The fluid-filled bladder appears intact.    There is a large heterogeneous mass in the lower abdomen measuring 2.5 cm   transverse x 9.2 cm AP x 8.8 cm in length. This mass circumferentially   envelops the sigmoid colon without intraluminal extension or obstruction.   Differential diagnosis includes lymphoma, just tumor or adenocarcinoma.    There is no intraperitoneal free air.  There is no free fluid. The aorta   is not aneurysmal.    There is no significant abdominal, retroperitoneal or pelvic   lymphadenopathy. The pelvic structures demonstrate enlarged prostate   gland.    The osseousstructures demonstrate degenerative changes of the spine and   right total hip replacement.    IMPRESSION:    Multiple liver lesions consistent with metastases    large mass in the lower abdomen/upper pelvis surrounding the sigmoid   colon which may be secondary to just tumor, lymphoma or adenocarcinoma.   There is no bowel obstruction.    Results are discussed with Dr. Snow at 4:40 PM on 3/6/2019.    --------------------------------------------------------------------------------------------------------------------------------------------------------------------    < from: CT Chest No Cont (19 @ 11:27) >  EXAM:  CT CHEST                            PROCEDURE DATE:  2019          INTERPRETATION:  CT CHEST WITHOUT CONTRAST    INDICATION: colon mass with liver mets    TECHNIQUE: Unenhanced helical images were obtained of the chest. Coronal   and sagittal images were reconstructed. Maximum intensity projection   images were generated.     COMPARISON: No prior chest CT.    FINDINGS:     AIRWAYS, LUNGS, PLEURA: Patent central airways. No bronchial wall   thickening or bronchiectasis.    No suspicious pulmonary nodule.    Bilateral pleural effusions and subsegmental atelectasis of basilar right   lower lobe.    MEDIASTINUM, LYMPH NODES: Subcentimeter mediastinal and supraclavicular   lymph nodes. Calcified mediastinal lymph nodes.    HEART ANDVASCULATURE: The heart is enlarged without pericardial   effusion. The thoracic aorta and pulmonary artery are normal in course   and caliber. Mild calcific coronary atherosclerosis. Low attenuation of   blood pool likely representing anemia.    UPPER ABDOMEN: Multiple liver metastatic lesions.    BONES AND SOFT TISSUES: No aggressive osseous lesion. Degenerative   changes of the spine.    LOWER NECK: Within normal limits.    IMPRESSION:     1.  No definite evidence of metastatic disease.    2.  Subcentimeter mediastinal and supraclavicular lymph nodes. These can   be further evaluated with PET/CT or follow-up chest CT in 3 months.    3.  Bilateral pleural effusions and subsegmental atelectasis of basilar   right lower lobe.          < end of copied text >    ROS  [  ] UNABLE TO ELICIT HPI:  83M with PMH of HTN, HLD, hypothyroidism, CHF with preserved EF, Parkinson's disease, comes in by EMS after visiting phlebotomist called 911. Per chart, patient was noted to be very dehydrated, and very weak. Wife at bedside shares that patient has not been eating or drinking liquids for the past several days and has been having waxing/waning confusion for the past day. Patient has had decreased movement, however unable to bear weight. Pt has been able to whispering, however overall much more weak, has not taken medication for the past 2 days.     History as above, asked to see pt as he has gram negative rods in his blood cultures. 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       PAST MEDICAL & SURGICAL HISTORY:  Hypothyroidism  Sacral decubitus ulcer, stage II  HLD (hyperlipidemia)  CHF (congestive heart failure)  Parkinson's disease  Hypertension  H/O hernia repair      No Known Allergies      Meds:  acetaminophen   Tablet .. 650 milliGRAM(s) Oral every 6 hours PRN  artificial  tears Solution 1 Drop(s) Left EYE three times a day  carbidopa/levodopa  10/100 1 Tablet(s) Oral <User Schedule>  cholecalciferol 1000 Unit(s) Oral daily  dextrose 5% + sodium chloride 0.9%. 1000 milliLiter(s) IV Continuous <Continuous>  enoxaparin Injectable 40 milliGRAM(s) SubCutaneous daily  erythromycin   Ointment 1 Application(s) Right EYE two times a day  insulin lispro (HumaLOG) corrective regimen sliding scale   SubCutaneous three times a day before meals  levothyroxine 100 MICROGram(s) Oral daily  piperacillin/tazobactam IVPB. 3.375 Gram(s) IV Intermittent every 8 hours  tamsulosin 0.4 milliGRAM(s) Oral at bedtime      SOCIAL HISTORY:  Smoker:  no  ETOH use:  no    FAMILY HISTORY:  No pertinent family history in first degree relatives      VITALS:  Vital Signs Last 24 Hrs  T(C): 36.3 (08 Mar 2019 07:36), Max: 38.2 (08 Mar 2019 00:42)  T(F): 97.4 (08 Mar 2019 07:36), Max: 100.8 (08 Mar 2019 00:42)  HR: 65 (08 Mar 2019 07:36) (65 - 69)  BP: 106/51 (08 Mar 2019 07:36) (106/51 - 108/53)  BP(mean): --  RR: 16 (08 Mar 2019 07:36) (16 - 16)  SpO2: 100% (08 Mar 2019 07:36) (97% - 100%)    LABS/DIAGNOSTIC TESTS:                          8.8    11.82 )-----------( 167      ( 08 Mar 2019 06:16 )             27.6     WBC Count: 11.82 K/uL ( @ 06:16)  WBC Count: 14.29 K/uL ( @ 06:57)  WBC Count: 11.84 K/uL ( @ 14:24)          140  |  110<H>  |  26<H>  ----------------------------<  167<H>  3.5   |  24  |  0.71    Ca    8.3<L>      08 Mar 2019 06:16  Phos  1.6       Mg     2.3         TPro  6.3  /  Alb  2.1<L>  /  TBili  1.2  /  DBili  x   /  AST  49<H>  /  ALT  47  /  AlkPhos  277<H>        Urinalysis Basic - ( 06 Mar 2019 16:48 )    Color: Yellow / Appearance: Clear / S.010 / pH: x  Gluc: x / Ketone: Small  / Bili: Negative / Urobili: Negative   Blood: x / Protein: 30 mg/dL / Nitrite: Negative   Leuk Esterase: Negative / RBC: 10-25 /HPF / WBC 0-2 /HPF   Sq Epi: x / Non Sq Epi: Few /HPF / Bacteria: Few /HPF        LIVER FUNCTIONS - ( 06 Mar 2019 14:24 )  Alb: 2.1 g/dL / Pro: 6.3 g/dL / ALK PHOS: 277 U/L / ALT: 47 U/L DA / AST: 49 U/L / GGT: x             PT/INR - ( 08 Mar 2019 06:16 )   PT: 14.3 sec;   INR: 1.28 ratio             LACTATE:    ABG -     CULTURES:   .Urine   @ 00:49   No growth  --  --      .Blood   @ 00:36   Growth in anaerobic bottle: Bacteroides fragilis "Susceptibilities not  performed"        RADIOLOGY:  < from: CT Abdomen and Pelvis w/ IV Cont (19 @ 16:07) >  EXAM:  CT ABDOMEN AND PELVIS IC                            *** ADDENDUM 2019  ***    This addendum is performed to correct a typographical error in the   report. The differential diagnosis includes Gist tumor.      *** END OF ADDENDUM 2019  ***      PROCEDURE DATE:  2019          INTERPRETATION:  CLINICAL STATEMENT: r/o colitis    TECHNIQUE: CT of the abdomen and pelvis was performed with IV contrast.   Oral contrast was administered. Approximately 90 cc of Omnipaque 350   administered. The study is limited by patient's inability to cooperate or   raise the arms above the head    COMPARISON: None.    FINDINGS:    The lower chest is unremarkable.    The liver is remarkable for a numerous hypodense lesions consistent with   metastatic disease. The fluid-filled gallbladder is appreciated.    The spleen, pancreas and adrenal glands are unremarkable.The kidneys are   unremarkable. The fluid-filled bladder appears intact.    There is a large heterogeneous mass in the lower abdomen measuring 2.5 cm   transverse x 9.2 cm AP x 8.8 cm in length. This mass circumferentially   envelops the sigmoid colon without intraluminal extension or obstruction.   Differential diagnosis includes lymphoma, just tumor or adenocarcinoma.    There is no intraperitoneal free air.  There is no free fluid. The aorta   is not aneurysmal.    There is no significant abdominal, retroperitoneal or pelvic   lymphadenopathy. The pelvic structures demonstrate enlarged prostate   gland.    The osseousstructures demonstrate degenerative changes of the spine and   right total hip replacement.    IMPRESSION:    Multiple liver lesions consistent with metastases    large mass in the lower abdomen/upper pelvis surrounding the sigmoid   colon which may be secondary to just tumor, lymphoma or adenocarcinoma.   There is no bowel obstruction.    Results are discussed with Dr. Snow at 4:40 PM on 3/6/2019.    --------------------------------------------------------------------------------------------------------------------------------------------------------------------    < from: CT Chest No Cont (19 @ 11:27) >  EXAM:  CT CHEST                            PROCEDURE DATE:  2019          INTERPRETATION:  CT CHEST WITHOUT CONTRAST    INDICATION: colon mass with liver mets    TECHNIQUE: Unenhanced helical images were obtained of the chest. Coronal   and sagittal images were reconstructed. Maximum intensity projection   images were generated.     COMPARISON: No prior chest CT.    FINDINGS:     AIRWAYS, LUNGS, PLEURA: Patent central airways. No bronchial wall   thickening or bronchiectasis.    No suspicious pulmonary nodule.    Bilateral pleural effusions and subsegmental atelectasis of basilar right   lower lobe.    MEDIASTINUM, LYMPH NODES: Subcentimeter mediastinal and supraclavicular   lymph nodes. Calcified mediastinal lymph nodes.    HEART ANDVASCULATURE: The heart is enlarged without pericardial   effusion. The thoracic aorta and pulmonary artery are normal in course   and caliber. Mild calcific coronary atherosclerosis. Low attenuation of   blood pool likely representing anemia.    UPPER ABDOMEN: Multiple liver metastatic lesions.    BONES AND SOFT TISSUES: No aggressive osseous lesion. Degenerative   changes of the spine.    LOWER NECK: Within normal limits.    IMPRESSION:     1.  No definite evidence of metastatic disease.    2.  Subcentimeter mediastinal and supraclavicular lymph nodes. These can   be further evaluated with PET/CT or follow-up chest CT in 3 months.    3.  Bilateral pleural effusions and subsegmental atelectasis of basilar   right lower lobe.          < end of copied text >    ROS  [  ] UNABLE TO ELICIT

## 2019-03-08 NOTE — PROGRESS NOTE ADULT - ASSESSMENT
83  year old male with newly diagnosed abdominal/ sigmoid mass.  I agree that the patient would be suitable  for at least a limited sigmoidoscopy for diagnosis and biopsy Would wait for another few days of antibiotics and  leukocytosis is improved. Family discussion will be needed regarding above.  If they agree then plan tentatively for sigmoidoscopy for Monday

## 2019-03-08 NOTE — CONSULT NOTE ADULT - PROBLEM SELECTOR RECOMMENDATION 4
Prior to admission patient was cognitively and functionally.  Currently bedbound.  Dependent on all ADL's.  PT rec NIDHI Prior to admission patient was fully functional.  Currently bedbound.  Dependent on all ADL's.  PT rec NIDHI Prior to admission patient was fully functional. Had recent fall no trauma.  Currently bedbound.  Dependent on all ADL's.  PT rec NIDHI

## 2019-03-08 NOTE — CONSULT NOTE ADULT - CONSULT REASON
Parkinsons's , Sigmoid mass/hepatic mets? malignancy, GOC Parkinson's , Sigmoid mass/hepatic mets? malignancy, GOC

## 2019-03-08 NOTE — DIETITIAN INITIAL EVALUATION ADULT. - PERTINENT LABORATORY DATA
03-08 Na140 mmol/L Glu 167 mg/dL<H> K+ 3.5 mmol/L Cr  0.71 mg/dL BUN 26 mg/dL<H> 03-08 Phos 1.6 mg/dL<L> 03-06 Alb 2.1 g/dL<L> 03-07 [id number] 6.7 %<H> 03-08 Chol 76 mg/dL LDL 32 mg/dL HDL 25 mg/dL<L> Trig 93 mg/dL

## 2019-03-09 LAB
ANION GAP SERPL CALC-SCNC: 7 MMOL/L — SIGNIFICANT CHANGE UP (ref 5–17)
BUN SERPL-MCNC: 19 MG/DL — HIGH (ref 7–18)
CALCIUM SERPL-MCNC: 8.2 MG/DL — LOW (ref 8.4–10.5)
CHLORIDE SERPL-SCNC: 107 MMOL/L — SIGNIFICANT CHANGE UP (ref 96–108)
CO2 SERPL-SCNC: 23 MMOL/L — SIGNIFICANT CHANGE UP (ref 22–31)
CREAT SERPL-MCNC: 0.64 MG/DL — SIGNIFICANT CHANGE UP (ref 0.5–1.3)
GLUCOSE SERPL-MCNC: 125 MG/DL — HIGH (ref 70–99)
HCT VFR BLD CALC: 29.2 % — LOW (ref 39–50)
HGB BLD-MCNC: 9 G/DL — LOW (ref 13–17)
MCHC RBC-ENTMCNC: 24.1 PG — LOW (ref 27–34)
MCHC RBC-ENTMCNC: 30.8 GM/DL — LOW (ref 32–36)
MCV RBC AUTO: 78.3 FL — LOW (ref 80–100)
NRBC # BLD: 0 /100 WBCS — SIGNIFICANT CHANGE UP (ref 0–0)
PLATELET # BLD AUTO: 177 K/UL — SIGNIFICANT CHANGE UP (ref 150–400)
POTASSIUM SERPL-MCNC: 3.8 MMOL/L — SIGNIFICANT CHANGE UP (ref 3.5–5.3)
POTASSIUM SERPL-SCNC: 3.8 MMOL/L — SIGNIFICANT CHANGE UP (ref 3.5–5.3)
RBC # BLD: 3.73 M/UL — LOW (ref 4.2–5.8)
RBC # FLD: 15.6 % — HIGH (ref 10.3–14.5)
SODIUM SERPL-SCNC: 137 MMOL/L — SIGNIFICANT CHANGE UP (ref 135–145)
WBC # BLD: 14.66 K/UL — HIGH (ref 3.8–10.5)
WBC # FLD AUTO: 14.66 K/UL — HIGH (ref 3.8–10.5)

## 2019-03-09 PROCEDURE — 99232 SBSQ HOSP IP/OBS MODERATE 35: CPT

## 2019-03-09 PROCEDURE — 99231 SBSQ HOSP IP/OBS SF/LOW 25: CPT

## 2019-03-09 RX ORDER — SODIUM CHLORIDE 9 MG/ML
1000 INJECTION, SOLUTION INTRAVENOUS
Qty: 0 | Refills: 0 | Status: DISCONTINUED | OUTPATIENT
Start: 2019-03-09 | End: 2019-03-12

## 2019-03-09 RX ADMIN — PIPERACILLIN AND TAZOBACTAM 25 GRAM(S): 4; .5 INJECTION, POWDER, LYOPHILIZED, FOR SOLUTION INTRAVENOUS at 23:18

## 2019-03-09 RX ADMIN — Medication 1 DROP(S): at 23:17

## 2019-03-09 RX ADMIN — PIPERACILLIN AND TAZOBACTAM 25 GRAM(S): 4; .5 INJECTION, POWDER, LYOPHILIZED, FOR SOLUTION INTRAVENOUS at 15:07

## 2019-03-09 RX ADMIN — Medication 1 DROP(S): at 06:29

## 2019-03-09 RX ADMIN — PIPERACILLIN AND TAZOBACTAM 25 GRAM(S): 4; .5 INJECTION, POWDER, LYOPHILIZED, FOR SOLUTION INTRAVENOUS at 06:29

## 2019-03-09 RX ADMIN — Medication 1 APPLICATION(S): at 17:28

## 2019-03-09 RX ADMIN — CARBIDOPA AND LEVODOPA 1 TABLET(S): 25; 100 TABLET ORAL at 15:08

## 2019-03-09 RX ADMIN — Medication 1: at 17:27

## 2019-03-09 RX ADMIN — CARBIDOPA AND LEVODOPA 1 TABLET(S): 25; 100 TABLET ORAL at 17:28

## 2019-03-09 RX ADMIN — Medication 1000 UNIT(S): at 12:09

## 2019-03-09 RX ADMIN — ENOXAPARIN SODIUM 40 MILLIGRAM(S): 100 INJECTION SUBCUTANEOUS at 12:09

## 2019-03-09 RX ADMIN — Medication 1 APPLICATION(S): at 06:29

## 2019-03-09 RX ADMIN — TAMSULOSIN HYDROCHLORIDE 0.4 MILLIGRAM(S): 0.4 CAPSULE ORAL at 23:17

## 2019-03-09 RX ADMIN — Medication 100 MICROGRAM(S): at 06:29

## 2019-03-09 RX ADMIN — CARBIDOPA AND LEVODOPA 1 TABLET(S): 25; 100 TABLET ORAL at 06:29

## 2019-03-09 RX ADMIN — Medication 1: at 12:10

## 2019-03-09 RX ADMIN — Medication 1 DROP(S): at 15:08

## 2019-03-09 NOTE — PROGRESS NOTE ADULT - PROBLEM SELECTOR PLAN 7
not in exacerbation  patient on atenolol at home, hold ,now in setting of hypotension  normal echo with GII DD

## 2019-03-09 NOTE — PROGRESS NOTE ADULT - SUBJECTIVE AND OBJECTIVE BOX
Patient seen/examined.  Feels weak. No events overnight.    Vital Signs Last 24 Hrs  T(C): 36.8 (09 Mar 2019 15:57), Max: 37.2 (09 Mar 2019 07:52)  T(F): 98.3 (09 Mar 2019 15:57), Max: 99 (09 Mar 2019 07:52)  HR: 75 (09 Mar 2019 15:57) (75 - 84)  BP: 115/67 (09 Mar 2019 15:57) (115/67 - 124/60)  BP(mean): --  RR: 16 (09 Mar 2019 15:57) (16 - 17)  SpO2: 97% (09 Mar 2019 15:57) (97% - 98%)    General: NAD. AAOx3  Abd: soft. NT/ND                          9.0    14.66 )-----------( 177      ( 09 Mar 2019 06:42 )             29.2     03-09    137  |  107  |  19<H>  ----------------------------<  125<H>  3.8   |  23  |  0.64    Ca    8.2<L>      09 Mar 2019 06:42  Phos  1.6     03-08  Mg     2.3     03-08    Culture - Urine (03.07.19 @ 00:49)    Specimen Source: .Urine    Culture Results:   No growth

## 2019-03-09 NOTE — PROGRESS NOTE ADULT - ASSESSMENT
Patient admitted for fatigue, increased lethargy, suspected sepsis given hypothermia and clinical condition, s/p Iv fluids and Zosyn  and treated for suspected hypothyroid state, being worked for colonic mass with multiple masses in liver, suspected for liver metastasis. Blood culture grew gm -ve bacteria, on zosyn.

## 2019-03-09 NOTE — PROGRESS NOTE ADULT - PROBLEM SELECTOR PLAN 2
annette - has palpable mass in Rt. lower abdomen, mildly tender, f/u GI for possible sigmoidscopy  - CT abdomen showed Multiple liver lesions consistent with metastases and large mass in the lower abdomen/upper pelvis surrounding the sigmoid   colon which may be secondary to just tumor, lymphoma or adenocarcinoma.   - occult negative  - FU CEA, CA 19-9, CT chest  - GI Dr. Murdock consulted- advised sigmoidoscopy for diagnosis and biopsy on monday if family agrees  - Hem-onc Dr. Camargo   - Palliative consulted

## 2019-03-09 NOTE — PROGRESS NOTE ADULT - PROBLEM SELECTOR PLAN 1
- mild leukocytosis likely due to steroid) trending up, febrile Tmax 100.8  - blood culture growing gm -ve bacteria,   - on zosyn D3  - UA and CXR, CT head negative  - repeat blood culture if temp spike  -f/u ID  pt is DNR/DNI now

## 2019-03-09 NOTE — PROGRESS NOTE ADULT - PROBLEM SELECTOR PLAN 3
s/p Hydrocortisone 50 mg IV x 1 dose then IV synthroid 50 microgm,  c/w synthroid 100mcg daily PO, (home dose)  TSH within normal limits  normal free T4

## 2019-03-09 NOTE — PROGRESS NOTE ADULT - SUBJECTIVE AND OBJECTIVE BOX
HPI:  83M with PMH of HTN, HLD, hypothyroidism, CHF with preserved EF, Parkinson's disease, admitted for weakness and lethargy, being treated for sepsis and hyopthroidism and mass in CT scan abdomen, pending sigmiodscopy,if family agrees.  positive BC being treated with abx.              INTERVAL HPI/OVERNIGHT EVENTS:  T(C): 37.2 (03-09-19 @ 07:52), Max: 37.2 (03-08-19 @ 15:54)  HR: 84 (03-09-19 @ 07:52) (66 - 84)  BP: 124/60 (03-09-19 @ 07:52) (110/54 - 124/60)  RR: 16 (03-09-19 @ 07:52) (16 - 17)  SpO2: 97% (03-09-19 @ 07:52) (97% - 98%)        REVIEW OF SYSTEMS: denies fever, chills, SOB, palpitations, chest pain, abdominal pain, nausea, vomitting, diarrhea, constipation, dizziness    MEDICATIONS  (STANDING):  artificial  tears Solution 1 Drop(s) Left EYE three times a day  carbidopa/levodopa  10/100 1 Tablet(s) Oral <User Schedule>  cholecalciferol 1000 Unit(s) Oral daily  dextrose 5% + sodium chloride 0.45%. 1000 milliLiter(s) (75 mL/Hr) IV Continuous <Continuous>  dextrose 5% + sodium chloride 0.9%. 1000 milliLiter(s) (75 mL/Hr) IV Continuous <Continuous>  enoxaparin Injectable 40 milliGRAM(s) SubCutaneous daily  erythromycin   Ointment 1 Application(s) Right EYE two times a day  insulin lispro (HumaLOG) corrective regimen sliding scale   SubCutaneous three times a day before meals  levothyroxine 100 MICROGram(s) Oral daily  piperacillin/tazobactam IVPB. 3.375 Gram(s) IV Intermittent every 8 hours  tamsulosin 0.4 milliGRAM(s) Oral at bedtime    MEDICATIONS  (PRN):  acetaminophen   Tablet .. 650 milliGRAM(s) Oral every 6 hours PRN Temp greater or equal to 38C (100.4F)      PHYSICAL EXAM:  GENERAL: NAD, soft spoken  HEENT: Normocephalic;  conjunctivae and sclerae clear; moist mucous membranes;   NECK : supple  CHEST/LUNG: Clear to auscultation bilaterally with good air entry   HEART: S1 S2  regular; no murmurs, gallops or rubs  ABDOMEN: Soft, Nontender, mass in Rt. lower quadrant, Nondistended; Bowel sounds present  EXTREMITIES: no cyanosis; no edema; no calf tenderness  SKIN: warm and dry; no rash  NERVOUS SYSTEM:  pt is sleeping, no new deficits                            9.0    14.66 )-----------( 177      ( 09 Mar 2019 06:42 )             29.2     03-09    137  |  107  |  19<H>  ----------------------------<  125<H>  3.8   |  23  |  0.64    Ca    8.2<L>      09 Mar 2019 06:42  Phos  1.6     03-08  Mg     2.3     03-08      PT/INR - ( 08 Mar 2019 06:16 )   PT: 14.3 sec;   INR: 1.28 ratio             CAPILLARY BLOOD GLUCOSE      POCT Blood Glucose.: 184 mg/dL (09 Mar 2019 11:50)  POCT Blood Glucose.: 138 mg/dL (09 Mar 2019 08:15)  POCT Blood Glucose.: 204 mg/dL (08 Mar 2019 21:17)  POCT Blood Glucose.: 136 mg/dL (08 Mar 2019 16:44)

## 2019-03-09 NOTE — PROGRESS NOTE ADULT - ASSESSMENT
Very pleasant 83 year old gentleman with BPH  -Continue Flomax  -UCx negative  - Follow up with Dr. Bell after discharge by calling [phone] or [phone] to schedule an appointment.   [address] [facility]. [address]  [place] [address]

## 2019-03-10 LAB
ANION GAP SERPL CALC-SCNC: 8 MMOL/L — SIGNIFICANT CHANGE UP (ref 5–17)
BUN SERPL-MCNC: 16 MG/DL — SIGNIFICANT CHANGE UP (ref 7–18)
CALCIUM SERPL-MCNC: 8.1 MG/DL — LOW (ref 8.4–10.5)
CHLORIDE SERPL-SCNC: 102 MMOL/L — SIGNIFICANT CHANGE UP (ref 96–108)
CO2 SERPL-SCNC: 24 MMOL/L — SIGNIFICANT CHANGE UP (ref 22–31)
CREAT SERPL-MCNC: 0.6 MG/DL — SIGNIFICANT CHANGE UP (ref 0.5–1.3)
GLUCOSE SERPL-MCNC: 115 MG/DL — HIGH (ref 70–99)
HCT VFR BLD CALC: 29.4 % — LOW (ref 39–50)
HGB BLD-MCNC: 9.2 G/DL — LOW (ref 13–17)
MCHC RBC-ENTMCNC: 24.5 PG — LOW (ref 27–34)
MCHC RBC-ENTMCNC: 31.3 GM/DL — LOW (ref 32–36)
MCV RBC AUTO: 78.4 FL — LOW (ref 80–100)
NRBC # BLD: 0 /100 WBCS — SIGNIFICANT CHANGE UP (ref 0–0)
PLATELET # BLD AUTO: 216 K/UL — SIGNIFICANT CHANGE UP (ref 150–400)
POTASSIUM SERPL-MCNC: 3.4 MMOL/L — LOW (ref 3.5–5.3)
POTASSIUM SERPL-SCNC: 3.4 MMOL/L — LOW (ref 3.5–5.3)
RBC # BLD: 3.75 M/UL — LOW (ref 4.2–5.8)
RBC # FLD: 15.6 % — HIGH (ref 10.3–14.5)
SODIUM SERPL-SCNC: 134 MMOL/L — LOW (ref 135–145)
WBC # BLD: 17.83 K/UL — HIGH (ref 3.8–10.5)
WBC # FLD AUTO: 17.83 K/UL — HIGH (ref 3.8–10.5)

## 2019-03-10 PROCEDURE — 99233 SBSQ HOSP IP/OBS HIGH 50: CPT | Mod: GC

## 2019-03-10 PROCEDURE — 99232 SBSQ HOSP IP/OBS MODERATE 35: CPT

## 2019-03-10 PROCEDURE — 99231 SBSQ HOSP IP/OBS SF/LOW 25: CPT

## 2019-03-10 RX ORDER — POTASSIUM CHLORIDE 20 MEQ
40 PACKET (EA) ORAL ONCE
Qty: 0 | Refills: 0 | Status: COMPLETED | OUTPATIENT
Start: 2019-03-10 | End: 2019-03-10

## 2019-03-10 RX ADMIN — Medication 1 APPLICATION(S): at 07:39

## 2019-03-10 RX ADMIN — Medication 1 APPLICATION(S): at 17:19

## 2019-03-10 RX ADMIN — PIPERACILLIN AND TAZOBACTAM 25 GRAM(S): 4; .5 INJECTION, POWDER, LYOPHILIZED, FOR SOLUTION INTRAVENOUS at 13:46

## 2019-03-10 RX ADMIN — Medication 1 DROP(S): at 22:15

## 2019-03-10 RX ADMIN — CARBIDOPA AND LEVODOPA 1 TABLET(S): 25; 100 TABLET ORAL at 11:34

## 2019-03-10 RX ADMIN — ENOXAPARIN SODIUM 40 MILLIGRAM(S): 100 INJECTION SUBCUTANEOUS at 11:34

## 2019-03-10 RX ADMIN — Medication 2: at 17:19

## 2019-03-10 RX ADMIN — Medication 1: at 11:33

## 2019-03-10 RX ADMIN — PIPERACILLIN AND TAZOBACTAM 25 GRAM(S): 4; .5 INJECTION, POWDER, LYOPHILIZED, FOR SOLUTION INTRAVENOUS at 07:38

## 2019-03-10 RX ADMIN — SODIUM CHLORIDE 75 MILLILITER(S): 9 INJECTION, SOLUTION INTRAVENOUS at 08:34

## 2019-03-10 RX ADMIN — Medication 1 DROP(S): at 13:46

## 2019-03-10 RX ADMIN — CARBIDOPA AND LEVODOPA 1 TABLET(S): 25; 100 TABLET ORAL at 07:37

## 2019-03-10 RX ADMIN — Medication 1 DROP(S): at 07:38

## 2019-03-10 RX ADMIN — Medication 100 MICROGRAM(S): at 07:38

## 2019-03-10 RX ADMIN — TAMSULOSIN HYDROCHLORIDE 0.4 MILLIGRAM(S): 0.4 CAPSULE ORAL at 22:16

## 2019-03-10 RX ADMIN — PIPERACILLIN AND TAZOBACTAM 25 GRAM(S): 4; .5 INJECTION, POWDER, LYOPHILIZED, FOR SOLUTION INTRAVENOUS at 22:15

## 2019-03-10 RX ADMIN — Medication 1000 UNIT(S): at 11:34

## 2019-03-10 RX ADMIN — Medication 40 MILLIEQUIVALENT(S): at 09:27

## 2019-03-10 RX ADMIN — CARBIDOPA AND LEVODOPA 1 TABLET(S): 25; 100 TABLET ORAL at 17:19

## 2019-03-10 NOTE — PROGRESS NOTE ADULT - SUBJECTIVE AND OBJECTIVE BOX
Subjective:   No event overnight     Objective:    MEDICATIONS  (STANDING):  artificial  tears Solution 1 Drop(s) Left EYE three times a day  carbidopa/levodopa  10/100 1 Tablet(s) Oral <User Schedule>  cholecalciferol 1000 Unit(s) Oral daily  dextrose 5% + sodium chloride 0.45%. 1000 milliLiter(s) (75 mL/Hr) IV Continuous <Continuous>  dextrose 5% + sodium chloride 0.9%. 1000 milliLiter(s) (75 mL/Hr) IV Continuous <Continuous>  enoxaparin Injectable 40 milliGRAM(s) SubCutaneous daily  erythromycin   Ointment 1 Application(s) Right EYE two times a day  insulin lispro (HumaLOG) corrective regimen sliding scale   SubCutaneous three times a day before meals  levothyroxine 100 MICROGram(s) Oral daily  piperacillin/tazobactam IVPB. 3.375 Gram(s) IV Intermittent every 8 hours  tamsulosin 0.4 milliGRAM(s) Oral at bedtime    MEDICATIONS  (PRN):  acetaminophen   Tablet .. 650 milliGRAM(s) Oral every 6 hours PRN Temp greater or equal to 38C (100.4F)              Vital Signs Last 24 Hrs  T(C): 36.3 (10 Mar 2019 08:18), Max: 37.4 (10 Mar 2019 00:13)  T(F): 97.4 (10 Mar 2019 08:18), Max: 99.3 (10 Mar 2019 00:13)  HR: 72 (10 Mar 2019 08:18) (72 - 98)  BP: 120/60 (10 Mar 2019 08:18) (115/67 - 128/67)  BP(mean): --  RR: 16 (10 Mar 2019 08:18) (16 - 16)  SpO2: 97% (10 Mar 2019 08:18) (97% - 98%)      General:  Well developed, well nourished, alert and active, no pallor, NAD.  HEENT:    Normal appearance of conjunctiva, ears, nose, lips, oropharynx, and oral mucosa, anicteric.  Neck:  No masses, no asymmetry.  Lymph Nodes:  No lymphadenopathy.   Cardiovascular:  RRR normal S1/S2, no murmur.  Respiratory:  CTA B/L, normal respiratory effort.   Abdominal:   soft, no masses or tenderness, normoactive BS, NT/ND, no HSM.  Extremities:   No clubbing or cyanosis, normal capillary refill, no edema.   Skin:   No rash, jaundice, lesions, eczema.   Musculoskeletal:  No joint swelling, erythema or tenderness.   Neuro: No focal deficits.   Other:       LABS:                        9.2    17.83 )-----------( 216      ( 10 Mar 2019 06:34 )             29.4     03-10    134<L>  |  102  |  16  ----------------------------<  115<H>  3.4<L>   |  24  |  0.60    Ca    8.1<L>      10 Mar 2019 06:34            RADIOLOGY & ADDITIONAL TESTS:

## 2019-03-10 NOTE — PROGRESS NOTE ADULT - PROBLEM SELECTOR PLAN 1
- bacteroides fragilis bacteremia likely due to colonic mass  -  Continue Zosyn  - repeat blood cultures so far negative  - ID follow up ongoing

## 2019-03-10 NOTE — PROGRESS NOTE ADULT - ASSESSMENT
83 year old male with large abdominal/sigmoid mass.     Plan:  Sigmoidoscopy tomorrow pending consent   NPO post midnight   Two fleet enemas tomorrow morning  if we are proceeding with sigmoidoscopy

## 2019-03-10 NOTE — PROGRESS NOTE ADULT - SUBJECTIVE AND OBJECTIVE BOX
PGY 1 Note discussed with supervising resident and primary attending    Patient is a 83y old  Male who presents with a chief complaint of weakness, lethargy (10 Mar 2019 10:19)      INTERVAL HPI/OVERNIGHT EVENTS: no acute overnight events, says he feels sleepy, otherwise offers no complaints, had normal BM and has fair appetite.    MEDICATIONS  (STANDING):  artificial  tears Solution 1 Drop(s) Left EYE three times a day  carbidopa/levodopa  10/100 1 Tablet(s) Oral <User Schedule>  cholecalciferol 1000 Unit(s) Oral daily  dextrose 5% + sodium chloride 0.45%. 1000 milliLiter(s) (75 mL/Hr) IV Continuous <Continuous>  dextrose 5% + sodium chloride 0.9%. 1000 milliLiter(s) (75 mL/Hr) IV Continuous <Continuous>  enoxaparin Injectable 40 milliGRAM(s) SubCutaneous daily  erythromycin   Ointment 1 Application(s) Right EYE two times a day  insulin lispro (HumaLOG) corrective regimen sliding scale   SubCutaneous three times a day before meals  levothyroxine 100 MICROGram(s) Oral daily  piperacillin/tazobactam IVPB. 3.375 Gram(s) IV Intermittent every 8 hours  tamsulosin 0.4 milliGRAM(s) Oral at bedtime    MEDICATIONS  (PRN):  acetaminophen   Tablet .. 650 milliGRAM(s) Oral every 6 hours PRN Temp greater or equal to 38C (100.4F)      __________________________________________________  REVIEW OF SYSTEMS:    CONSTITUTIONAL: No fever,   EYES: no acute visual disturbances  NECK: No pain or stiffness  RESPIRATORY: No cough; No shortness of breath  CARDIOVASCULAR: No chest pain, no palpitations  GASTROINTESTINAL: No pain. No nausea or vomiting; No diarrhea   NEUROLOGICAL: No headache or numbness, no tremors  MUSCULOSKELETAL: No joint pain, no muscle pain  GENITOURINARY: no dysuria, no frequency, no hesitancy  PSYCHIATRY: no depression , no anxiety  ALL OTHER  ROS negative        Vital Signs Last 24 Hrs  T(C): 36.3 (10 Mar 2019 08:18), Max: 37.4 (10 Mar 2019 00:13)  T(F): 97.4 (10 Mar 2019 08:18), Max: 99.3 (10 Mar 2019 00:13)  HR: 72 (10 Mar 2019 08:18) (72 - 98)  BP: 120/60 (10 Mar 2019 08:18) (115/67 - 128/67)  BP(mean): --  RR: 16 (10 Mar 2019 08:18) (16 - 16)  SpO2: 97% (10 Mar 2019 08:18) (97% - 98%)    ________________________________________________  PHYSICAL EXAM:  GENERAL: NAD, soft spoken  HEENT: Normocephalic;  conjunctivae and sclerae clear; moist mucous membranes;   NECK : supple  CHEST/LUNG: Clear to auscultation bilaterally with good air entry   HEART: S1 S2  regular; no murmurs, gallops or rubs  ABDOMEN: Soft, Nontender, mass in Rt. lower quadrant, Nondistended; Bowel sounds present  EXTREMITIES: no cyanosis; no edema; no calf tenderness  SKIN: warm and dry; no rash  NERVOUS SYSTEM:  Awake and alert; Oriented  to place, person and time ; no new deficits  _________________________________________________    LABS:                        9.2    17.83 )-----------( 216      ( 10 Mar 2019 06:34 )             29.4     03-10    134<L>  |  102  |  16  ----------------------------<  115<H>  3.4<L>   |  24  |  0.60    Ca    8.1<L>      10 Mar 2019 06:34          CAPILLARY BLOOD GLUCOSE      POCT Blood Glucose.: 135 mg/dL (10 Mar 2019 08:38)  POCT Blood Glucose.: 184 mg/dL (09 Mar 2019 21:26)  POCT Blood Glucose.: 177 mg/dL (09 Mar 2019 16:37)  POCT Blood Glucose.: 184 mg/dL (09 Mar 2019 11:50)        RADIOLOGY & ADDITIONAL TESTS:    < from: CT Abdomen and Pelvis w/ IV Cont (03.06.19 @ 16:07) >  IMPRESSION:    Multiple liver lesions consistent with metastases    large mass in the lower abdomen/upper pelvis surrounding the sigmoid   colon which may be secondary to just tumor, lymphoma or adenocarcinoma.   There is no bowel obstruction.    < from: CT Chest No Cont (03.08.19 @ 11:27) >  IMPRESSION:     1.  No definite evidence of metastatic disease.    2.  Subcentimeter mediastinal and supraclavicular lymph nodes. These can   be further evaluated with PET/CT or follow-up chest CT in 3 months.    3.  Bilateral pleural effusions and subsegmental atelectasis of basilar   right lower lobe.          Imaging Personally Reviewed:  YES    Consultant(s) Notes Reviewed:   YES    Care Discussed with Consultants : YES      Plan of care was discussed with patient and /or primary care giver; all questions and concerns were addressed and care was aligned with patient's wishes.

## 2019-03-10 NOTE — PROGRESS NOTE ADULT - PROBLEM SELECTOR PLAN 2
- has palpable mass in Rt. lower abdomen- CT abdomen showed Multiple liver lesions consistent with metastases and large mass in the lower abdomen/upper pelvis surrounding the sigmoid colon which  is presumed  neoplasm.  -  CT chest shows no mets with Bilateral pleural effusions and subsegmental atelectasis of basilar right lower lobe.  - occult negative  - CEA, CA 19-9 within normal range  - GI Dr. Murdock consulted- advised sigmoidoscopy for diagnosis and biopsy on Monday   Keep NPO after midnight with plan for sigmoidoscopy  fleet enema in AM  - Hem-onc Dr. Camargo.  - Palliative consulted- PT. DNR/DNI

## 2019-03-10 NOTE — PROGRESS NOTE ADULT - SUBJECTIVE AND OBJECTIVE BOX
83y Male who is still extremely weak, he has a poor appetite and still speaks very softly. His son and wife are at the bedside. The son has a lot of questions. His repeat blood cultures are negative. No fevers or chills, no diarrhea.    Meds:  piperacillin/tazobactam IVPB. 3.375 Gram(s) IV Intermittent every 8 hours    Allergies    No Known Allergies    Intolerances        VITALS:  Vital Signs Last 24 Hrs  T(C): 36.9 (10 Mar 2019 15:57), Max: 37.4 (10 Mar 2019 00:13)  T(F): 98.5 (10 Mar 2019 15:57), Max: 99.3 (10 Mar 2019 00:13)  HR: 72 (10 Mar 2019 15:57) (72 - 98)  BP: 117/73 (10 Mar 2019 15:57) (117/73 - 128/67)  BP(mean): --  RR: 16 (10 Mar 2019 15:57) (16 - 16)  SpO2: 96% (10 Mar 2019 15:57) (96% - 98%)    LABS/DIAGNOSTIC TESTS:                          9.2    17.83 )-----------( 216      ( 10 Mar 2019 06:34 )             29.4         03-10    134<L>  |  102  |  16  ----------------------------<  115<H>  3.4<L>   |  24  |  0.60    Ca    8.1<L>      10 Mar 2019 06:34            CULTURES: .Blood  03-09 @ 09:56   No growth to date.  --  --      .Urine  03-07 @ 00:49   No growth  --  --      .Blood  03-07 @ 00:36   Growth in anaerobic bottle: Bacteroides fragilis "Susceptibilities not  performed"            RADIOLOGY:      ROS:  [  ] UNABLE TO ELICIT

## 2019-03-10 NOTE — PROGRESS NOTE ADULT - PROBLEM SELECTOR PLAN 1
- p/w hypothermia and hypotension, s/p code sepsis  - leukocytosis trending up, afebrile overnight  - blood culture shows bacteroides fragilis likely due to colonic mass  - on zosyn D4  - UA and CXR, CT head negative  - FU repeat blood culture  - ID Dr. Artis - p/w hypothermia and hypotension, s/p code sepsis  - leukocytosis trending up, afebrile overnight  - blood culture shows bacteroides fragilis likely due to colonic mass  - on zosyn D4  - UA and CXR, CT head negative  - negative repeat blood culture  - ID Dr. Artis

## 2019-03-10 NOTE — PROGRESS NOTE ADULT - SUBJECTIVE AND OBJECTIVE BOX
Patient seen/examined.  No events overnight. Feeling somewhat better.    Vital Signs Last 24 Hrs  T(C): 36.3 (10 Mar 2019 08:18), Max: 37.4 (10 Mar 2019 00:13)  T(F): 97.4 (10 Mar 2019 08:18), Max: 99.3 (10 Mar 2019 00:13)  HR: 72 (10 Mar 2019 08:18) (72 - 98)  BP: 120/60 (10 Mar 2019 08:18) (115/67 - 128/67)  BP(mean): --  RR: 16 (10 Mar 2019 08:18) (16 - 16)  SpO2: 97% (10 Mar 2019 08:18) (97% - 98%)    General: NAD. AAOx3  Abd: soft. NT/ND                          9.2    17.83 )-----------( 216      ( 10 Mar 2019 06:34 )             29.4     03-10    134<L>  |  102  |  16  ----------------------------<  115<H>  3.4<L>   |  24  |  0.60    Ca    8.1<L>      10 Mar 2019 06:34

## 2019-03-10 NOTE — PROGRESS NOTE ADULT - SUBJECTIVE AND OBJECTIVE BOX
MEDICAL ATTENDING NOTE    Patient is a 83y old  Male who presents with a chief complaint of weakness, lethargy (09 Mar 2019 16:33)      INTERVAL HPI/OVERNIGHT EVENTS: no new complaints    MEDICATIONS  (STANDING):  artificial  tears Solution 1 Drop(s) Left EYE three times a day  carbidopa/levodopa  10/100 1 Tablet(s) Oral <User Schedule>  cholecalciferol 1000 Unit(s) Oral daily  dextrose 5% + sodium chloride 0.45%. 1000 milliLiter(s) (75 mL/Hr) IV Continuous <Continuous>  dextrose 5% + sodium chloride 0.9%. 1000 milliLiter(s) (75 mL/Hr) IV Continuous <Continuous>  enoxaparin Injectable 40 milliGRAM(s) SubCutaneous daily  erythromycin   Ointment 1 Application(s) Right EYE two times a day  insulin lispro (HumaLOG) corrective regimen sliding scale   SubCutaneous three times a day before meals  levothyroxine 100 MICROGram(s) Oral daily  piperacillin/tazobactam IVPB. 3.375 Gram(s) IV Intermittent every 8 hours  tamsulosin 0.4 milliGRAM(s) Oral at bedtime    MEDICATIONS  (PRN):  acetaminophen   Tablet .. 650 milliGRAM(s) Oral every 6 hours PRN Temp greater or equal to 38C (100.4F)      __________________________________________________  ----------------------------------------------------------------------------------  REVIEW OF SYSTEMS:  fever+, no SOB, No Chest pain; feels ok      Vital Signs Last 24 Hrs  T(C): 36.3 (10 Mar 2019 08:18), Max: 37.4 (10 Mar 2019 00:13)  T(F): 97.4 (10 Mar 2019 08:18), Max: 99.3 (10 Mar 2019 00:13)  HR: 72 (10 Mar 2019 08:18) (72 - 98)  BP: 120/60 (10 Mar 2019 08:18) (115/67 - 128/67)  BP(mean): --  RR: 16 (10 Mar 2019 08:18) (16 - 16)  SpO2: 97% (10 Mar 2019 08:18) (97% - 98%)    _________________  PHYSICAL EXAM:  ---------------------------   NAD; Normocephalic;   LUNGS - no wheezing  HEART: S1 S2+   ABDOMEN: Soft, Nontender, non distended  EXTREMITIES: no cyanosis; no edema  NERVOUS SYSTEM:  Awake and alert; no new deficits    _________________________________________________  LABS:                        9.2    17.83 )-----------( 216      ( 10 Mar 2019 06:34 )             29.4     03-10    134<L>  |  102  |  16  ----------------------------<  115<H>  3.4<L>   |  24  |  0.60    Ca    8.1<L>      10 Mar 2019 06:34          CAPILLARY BLOOD GLUCOSE      POCT Blood Glucose.: 135 mg/dL (10 Mar 2019 08:38)  POCT Blood Glucose.: 184 mg/dL (09 Mar 2019 21:26)  POCT Blood Glucose.: 177 mg/dL (09 Mar 2019 16:37)  POCT Blood Glucose.: 184 mg/dL (09 Mar 2019 11:50)            Care Discussed with Consultants :     Plan of care was discussed with patient ; all questions and concerns were addressed and care was aligned with patient's wishes. MEDICAL ATTENDING NOTE    Patient is a 83y old  Male who presents with a chief complaint of weakness, lethargy (09 Mar 2019 16:33)      INTERVAL HPI/OVERNIGHT EVENTS: no new complaints    MEDICATIONS  (STANDING):  artificial  tears Solution 1 Drop(s) Left EYE three times a day  carbidopa/levodopa  10/100 1 Tablet(s) Oral <User Schedule>  cholecalciferol 1000 Unit(s) Oral daily  dextrose 5% + sodium chloride 0.45%. 1000 milliLiter(s) (75 mL/Hr) IV Continuous <Continuous>  dextrose 5% + sodium chloride 0.9%. 1000 milliLiter(s) (75 mL/Hr) IV Continuous <Continuous>  enoxaparin Injectable 40 milliGRAM(s) SubCutaneous daily  erythromycin   Ointment 1 Application(s) Right EYE two times a day  insulin lispro (HumaLOG) corrective regimen sliding scale   SubCutaneous three times a day before meals  levothyroxine 100 MICROGram(s) Oral daily  piperacillin/tazobactam IVPB. 3.375 Gram(s) IV Intermittent every 8 hours  tamsulosin 0.4 milliGRAM(s) Oral at bedtime    MEDICATIONS  (PRN):  acetaminophen   Tablet .. 650 milliGRAM(s) Oral every 6 hours PRN Temp greater or equal to 38C (100.4F)      __________________________________________________  ----------------------------------------------------------------------------------  REVIEW OF SYSTEMS:  fever+, no SOB, No Chest pain; feels ok      Vital Signs Last 24 Hrs  T(C): 36.3 (10 Mar 2019 08:18), Max: 37.4 (10 Mar 2019 00:13)  T(F): 97.4 (10 Mar 2019 08:18), Max: 99.3 (10 Mar 2019 00:13)  HR: 72 (10 Mar 2019 08:18) (72 - 98)  BP: 120/60 (10 Mar 2019 08:18) (115/67 - 128/67)  BP(mean): --  RR: 16 (10 Mar 2019 08:18) (16 - 16)  SpO2: 97% (10 Mar 2019 08:18) (97% - 98%)    _________________  PHYSICAL EXAM:  ---------------------------   NAD; Normocephalic;   LUNGS - no wheezing  HEART: S1 S2+   ABDOMEN: Soft, Nontender, non distended  EXTREMITIES: no cyanosis; no edema  NERVOUS SYSTEM:  Awake and alert; no new deficits    _________________________________________________  LABS:                        9.2    17.83 )-----------( 216      ( 10 Mar 2019 06:34 )             29.4     03-10    134<L>  |  102  |  16  ----------------------------<  115<H>  3.4<L>   |  24  |  0.60    Ca    8.1<L>      10 Mar 2019 06:34          Culture Results:   No growth to date. (03.09.19 @ 09:56)    Culture - Blood (03.07.19 @ 00:36)    Gram Stain:   Growth in anaerobic bottle: Gram Negative Rods    Specimen Source: .Blood    Culture Results:   Growth in anaerobic bottle: Bacteroides fragilis  "Susceptibilities not performed"        CAPILLARY BLOOD GLUCOSE      POCT Blood Glucose.: 135 mg/dL (10 Mar 2019 08:38)  POCT Blood Glucose.: 184 mg/dL (09 Mar 2019 21:26)  POCT Blood Glucose.: 177 mg/dL (09 Mar 2019 16:37)  POCT Blood Glucose.: 184 mg/dL (09 Mar 2019 11:50)            Care Discussed with Consultants :     Plan of care was discussed with patient ; all questions and concerns were addressed and care was aligned with patient's wishes.

## 2019-03-10 NOTE — PROGRESS NOTE ADULT - ASSESSMENT
Bacteremia - with bacteroides  fevers - improved  Leukocytosis    plan - cont zosyn 3.375 gms iv q8 hrs  cleared from an ID point of view to get a biopsy and or surgery.

## 2019-03-10 NOTE — PROGRESS NOTE ADULT - ASSESSMENT
Very pleasant 83 year old gentleman with BPH  -Continue Flomax  -UCx negative  - Follow up with Dr. Bell after discharge by calling [phone] or [phone] to schedule an appointment.   [address] [facility] [address]  [place] [address]

## 2019-03-10 NOTE — PROGRESS NOTE ADULT - ASSESSMENT
Patient admitted for fatigue, increased lethargy, suspected sepsis given hypothermia and clinical condition, s/p Iv fluids and Zosyn in ED, and treated for suspected hypothyroid state, patient also noted to have colonic mass with multiple masses in liver, suspected for liver metastasis. Blood culture grew gram negative  bacteremia.        !. Gram negative bacteremia with sepsis on admission- sepsis now resolved  2. Colonic mass  3. Hypokalemia  4. Pressure ulcer  5. Hypothyroidism  6. Liver metastasis  7. Debility  8. Leukocytosis

## 2019-03-10 NOTE — PROGRESS NOTE ADULT - PROBLEM SELECTOR PLAN 2
- has palpable mass in Rt. lower abdomen, mildly tender, had bloody bowel movement before 3 nights, now no bowel complaints  - CT abdomen showed Multiple liver lesions consistent with metastases and large mass in the lower abdomen/upper pelvis surrounding the sigmoid   colon which may be secondary to just tumor, lymphoma or adenocarcinoma.   -  CT chest shows no mets with Bilateral pleural effusions and subsegmental atelectasis of basilar right lower lobe.  - occult negative  - normal CEA, CA 19-9,  - GI Dr. Murdock consulted- advised sigmoidoscopy for diagnosis and biopsy on monday if family agrees, NPO after MN, fleet enema in AM  - Hem-onc Dr. Camargo consulted  - Palliative consulted- PT. DNR/DNI

## 2019-03-11 ENCOUNTER — RESULT REVIEW (OUTPATIENT)
Age: 84
End: 2019-03-11

## 2019-03-11 DIAGNOSIS — C18.7 MALIGNANT NEOPLASM OF SIGMOID COLON: ICD-10-CM

## 2019-03-11 LAB
ANION GAP SERPL CALC-SCNC: 7 MMOL/L — SIGNIFICANT CHANGE UP (ref 5–17)
BUN SERPL-MCNC: 14 MG/DL — SIGNIFICANT CHANGE UP (ref 7–18)
CALCIUM SERPL-MCNC: 8.1 MG/DL — LOW (ref 8.4–10.5)
CHLORIDE SERPL-SCNC: 101 MMOL/L — SIGNIFICANT CHANGE UP (ref 96–108)
CO2 SERPL-SCNC: 25 MMOL/L — SIGNIFICANT CHANGE UP (ref 22–31)
CREAT SERPL-MCNC: 0.51 MG/DL — SIGNIFICANT CHANGE UP (ref 0.5–1.3)
GLUCOSE SERPL-MCNC: 113 MG/DL — HIGH (ref 70–99)
GRAM STN FLD: SIGNIFICANT CHANGE UP
GRAM STN FLD: SIGNIFICANT CHANGE UP
HCT VFR BLD CALC: 29.6 % — LOW (ref 39–50)
HGB BLD-MCNC: 9.5 G/DL — LOW (ref 13–17)
MCHC RBC-ENTMCNC: 24.9 PG — LOW (ref 27–34)
MCHC RBC-ENTMCNC: 32.1 GM/DL — SIGNIFICANT CHANGE UP (ref 32–36)
MCV RBC AUTO: 77.5 FL — LOW (ref 80–100)
NRBC # BLD: 0 /100 WBCS — SIGNIFICANT CHANGE UP (ref 0–0)
PLATELET # BLD AUTO: 245 K/UL — SIGNIFICANT CHANGE UP (ref 150–400)
POTASSIUM SERPL-MCNC: 3.3 MMOL/L — LOW (ref 3.5–5.3)
POTASSIUM SERPL-SCNC: 3.3 MMOL/L — LOW (ref 3.5–5.3)
RBC # BLD: 3.82 M/UL — LOW (ref 4.2–5.8)
RBC # FLD: 15.5 % — HIGH (ref 10.3–14.5)
SODIUM SERPL-SCNC: 133 MMOL/L — LOW (ref 135–145)
WBC # BLD: 15.46 K/UL — HIGH (ref 3.8–10.5)
WBC # FLD AUTO: 15.46 K/UL — HIGH (ref 3.8–10.5)

## 2019-03-11 PROCEDURE — 47000 NEEDLE BIOPSY OF LIVER PERQ: CPT

## 2019-03-11 PROCEDURE — 88307 TISSUE EXAM BY PATHOLOGIST: CPT | Mod: 26

## 2019-03-11 PROCEDURE — 76705 ECHO EXAM OF ABDOMEN: CPT | Mod: 26

## 2019-03-11 PROCEDURE — 99233 SBSQ HOSP IP/OBS HIGH 50: CPT | Mod: GC

## 2019-03-11 PROCEDURE — 76942 ECHO GUIDE FOR BIOPSY: CPT | Mod: 26

## 2019-03-11 RX ORDER — POTASSIUM CHLORIDE 20 MEQ
40 PACKET (EA) ORAL EVERY 4 HOURS
Qty: 0 | Refills: 0 | Status: COMPLETED | OUTPATIENT
Start: 2019-03-11 | End: 2019-03-11

## 2019-03-11 RX ORDER — POTASSIUM CHLORIDE 20 MEQ
40 PACKET (EA) ORAL EVERY 4 HOURS
Qty: 0 | Refills: 0 | Status: DISCONTINUED | OUTPATIENT
Start: 2019-03-11 | End: 2019-03-11

## 2019-03-11 RX ADMIN — PIPERACILLIN AND TAZOBACTAM 25 GRAM(S): 4; .5 INJECTION, POWDER, LYOPHILIZED, FOR SOLUTION INTRAVENOUS at 13:27

## 2019-03-11 RX ADMIN — CARBIDOPA AND LEVODOPA 1 TABLET(S): 25; 100 TABLET ORAL at 17:26

## 2019-03-11 RX ADMIN — Medication 2 ENEMA: at 06:54

## 2019-03-11 RX ADMIN — TAMSULOSIN HYDROCHLORIDE 0.4 MILLIGRAM(S): 0.4 CAPSULE ORAL at 22:03

## 2019-03-11 RX ADMIN — PIPERACILLIN AND TAZOBACTAM 25 GRAM(S): 4; .5 INJECTION, POWDER, LYOPHILIZED, FOR SOLUTION INTRAVENOUS at 06:47

## 2019-03-11 RX ADMIN — Medication 1 DROP(S): at 06:48

## 2019-03-11 RX ADMIN — Medication 1 DROP(S): at 13:27

## 2019-03-11 RX ADMIN — ENOXAPARIN SODIUM 40 MILLIGRAM(S): 100 INJECTION SUBCUTANEOUS at 13:27

## 2019-03-11 RX ADMIN — Medication 40 MILLIEQUIVALENT(S): at 17:26

## 2019-03-11 RX ADMIN — PIPERACILLIN AND TAZOBACTAM 25 GRAM(S): 4; .5 INJECTION, POWDER, LYOPHILIZED, FOR SOLUTION INTRAVENOUS at 22:03

## 2019-03-11 RX ADMIN — CARBIDOPA AND LEVODOPA 1 TABLET(S): 25; 100 TABLET ORAL at 13:27

## 2019-03-11 RX ADMIN — Medication 1000 UNIT(S): at 13:26

## 2019-03-11 RX ADMIN — Medication 100 MICROGRAM(S): at 06:48

## 2019-03-11 RX ADMIN — Medication 1 DROP(S): at 22:04

## 2019-03-11 RX ADMIN — Medication 1 APPLICATION(S): at 06:52

## 2019-03-11 RX ADMIN — Medication 1 APPLICATION(S): at 17:26

## 2019-03-11 RX ADMIN — CARBIDOPA AND LEVODOPA 1 TABLET(S): 25; 100 TABLET ORAL at 06:48

## 2019-03-11 NOTE — PROGRESS NOTE ADULT - PROBLEM SELECTOR PLAN 1
- p/w hypothermia and hypotension, s/p code sepsis  - leukocytosis trending up, afebrile overnight  - blood culture shows bacteroides fragilis likely due to colonic mass  - on zosyn D4  - UA and CXR, CT head negative  - negative repeat blood culture  - ID Dr. Artis - p/w hypothermia and hypotension, s/p code sepsis  - leukocytosis trending up, afebrile overnight  - blood culture shows bacteroides fragilis likely due to colonic mass  - on zosyn D4  - UA and CXR, CT head negative  - repeat blood culture from 03/09 growing gram neg rods.  repeat blood cultures sent today  - ID Dr. Artis

## 2019-03-11 NOTE — PROGRESS NOTE ADULT - PROBLEM SELECTOR PLAN 10
IMPROVE VTE Individual Risk Assessment          RISK                                                          Points  [  ] Previous VTE                                                3  [  ] Thrombophilia                                             2  [  ] Lower limb paralysis                                   2        (unable to hold up >15 seconds)    [  ] Current Cancer                                             2         (within 6 months)  [  x] Immobilization > 24 hrs                              1  [  ] ICU/CCU stay > 24 hours                             1  [ x ] Age > 60                                                         1    IMPROVE VTE Score: 2    lovenox for vte prophylaxis
IMPROVE VTE Individual Risk Assessment          RISK                                                          Points  [  ] Previous VTE                                                3  [  ] Thrombophilia                                             2  [  ] Lower limb paralysis                                   2        (unable to hold up >15 seconds)    [  ] Current Cancer                                             2         (within 6 months)  [  x] Immobilization > 24 hrs                              1  [  ] ICU/CCU stay > 24 hours                             1  [ x ] Age > 60                                                         1    IMPROVE VTE Score: 2    heparin for vte prophylaxis

## 2019-03-11 NOTE — CONSULT NOTE ADULT - SUBJECTIVE AND OBJECTIVE BOX
HPI:  83M with PMH of HTN, HLD, hypothyroidism, CHF with preserved EF, Parkinson's disease, comes in by EMS after visiting phlebotomist called 911. Per chart, patient was noted to be very dehydrated, and very weak. Wife at bedside shares that patient has not been eating or drinking liquids for the past several days and has been having waxing/waning confusion for the past day. Patient has had decreased movement, however unable to bear weight. Pt has been able to whispering, however overall much more weak, has not taken medication for the past 2 days. ED physician on admission initiated goals of care however wife Initiated goals of care, but wife has not decided. Pt too sick to have discussion about goals of care at present, will defer until patient more improved. (06 Mar 2019 16:51)    Patient admitted for fatigue, increased lethargy, suspected sepsis given hypothermia and clinical condition, s/p Iv fluids and Zosyn in ED, and treated for suspected hypothyroid state, patient also noted to have colonic mass with multiple masses in liver, suspected for liver metastasis. Blood culture grew gm -ve bacteria, pending final result.      PAST MEDICAL & SURGICAL HISTORY:  Hypothyroidism  Sacral decubitus ulcer, stage II  HLD (hyperlipidemia)  CHF (congestive heart failure)  Parkinson's disease  Hypertension  H/O hernia repair      MEDICATIONS  (STANDING):  artificial  tears Solution 1 Drop(s) Left EYE three times a day  carbidopa/levodopa  10/100 1 Tablet(s) Oral <User Schedule>  cholecalciferol 1000 Unit(s) Oral daily  dextrose 5% + sodium chloride 0.45%. 1000 milliLiter(s) (75 mL/Hr) IV Continuous <Continuous>  dextrose 5% + sodium chloride 0.9%. 1000 milliLiter(s) (75 mL/Hr) IV Continuous <Continuous>  erythromycin   Ointment 1 Application(s) Right EYE two times a day  insulin lispro (HumaLOG) corrective regimen sliding scale   SubCutaneous three times a day before meals  levothyroxine 100 MICROGram(s) Oral daily  piperacillin/tazobactam IVPB. 3.375 Gram(s) IV Intermittent every 8 hours  tamsulosin 0.4 milliGRAM(s) Oral at bedtime    MEDICATIONS  (PRN):  acetaminophen   Tablet .. 650 milliGRAM(s) Oral every 6 hours PRN Temp greater or equal to 38C (100.4F)      Allergies    No Known Allergies    Intolerances        SOCIAL HISTORY:  No drug use    FAMILY HISTORY:  No pertinent family history in first degree relatives    Father without Cardiac history.    REVIEW OF SYSTEMS:  CONSTITUTIONAL: In no acute distress.        Vital Signs Last 24 Hrs  T(C): 36.6 (11 Mar 2019 15:55), Max: 37.2 (11 Mar 2019 08:25)  T(F): 97.9 (11 Mar 2019 15:55), Max: 98.9 (11 Mar 2019 08:25)  HR: 71 (11 Mar 2019 15:55) (68 - 76)  BP: 100/62 (11 Mar 2019 15:55) (100/62 - 126/60)  BP(mean): --  RR: 16 (11 Mar 2019 15:55) (16 - 16)  SpO2: 97% (11 Mar 2019 15:55) (97% - 98%)    Daily     Daily     PHYSICAL EXAM:  CONSTITIONAL/GENERAL: Very weak, whispering voice  HEAD:  Atraumatic, Normocephalic  VISUAL/EYES: EOMI, PERRL, conjunctiva and sclera clear  ENMT: Moist mucous membranes, Good dentition, No lesions  NECK: Supple, No JVD  NERVOUS SYSTEM:  Alert & Oriented X3, Good concentration  RESPIRATORY/CHEST: Clear to percussion bilaterally; Normal respiratory effort  CARDIOVASCULAR/HEART: Regular rate and rhythm  GASTROINTESTINAL/ABDOMEN: Soft, Nontender, Nondistended; Bowel sounds present, Lower abd fullness  GENITOURINARY: normal external genitalia  BREASTS: no breast lumps  MUSCULOSKELETAL/EXTREMITIES:  No clubbing, cyanosis, or edema  VASCULAR: equal peripheral pulses  LYMPHATIC: No lymphadenopathy noted  SKIN: No rashes or lesions  PSYCHIATRIC: normal affect      LABS:                        9.5    15.46 )-----------( 245      ( 11 Mar 2019 06:51 )             29.6     Neutrophil %:   03-11    133<L>  |  101  |  14  ----------------------------<  113<H>  3.3<L>   |  25  |  0.51    Ca    8.1<L>      11 Mar 2019 06:51        RADIOLOGY & ADDITIONAL STUDIES:  < from: CT Abdomen and Pelvis w/ IV Cont (03.06.19 @ 16:07) >  IMPRESSION:    Multiple liver lesions consistent with metastases    large mass in the lower abdomen/upper pelvis surrounding the sigmoid   colon which may be secondary to just tumor, lymphoma or adenocarcinoma.   There is no bowel obstruction.    < end of copied text >  < from: CT Abdomen and Pelvis w/ IV Cont (03.06.19 @ 16:07) >  IMPRESSION:    Multiple liver lesions consistent with metastases    large mass in the lower abdomen/upper pelvis surrounding the sigmoid   colon which may be secondary to just tumor, lymphoma or adenocarcinoma.   There is no bowel obstruction.    < end of copied text >    < from: US Hepatic & Pancreatic (03.11.19 @ 14:15) >  IMPRESSION:    Multiple hepatic masses consistent with metastatic disease.    Gallbladder sludge.    < end of copied text >

## 2019-03-11 NOTE — PROGRESS NOTE ADULT - ATTENDING COMMENTS
Patient seen/evaluated at bedside 3/11/2019. I agree with the resident progress note/outlined plan of care. My independent findings and conclusions are documented.    Pt denies pain at biopsy site. Still very fatigued and weak. 2nd set of blood cx returned positive for gram negative rods. First set consistent with Bacteroides Fragilis    PE: Soft spoken, Awake, alert chronically ill appearing  S1S2 RRR  CTAB/l no accessory muscle use  soft, NT, ND, + BS, anterior right abd biopsy site clean/dry/intact  significant for mild RUQ and bilateral lower quadrant tenderness on palpation    1. Sepsis present on admission (with hypothermia, hypotension)  2. Bacteroides Fragilis Bacteremia  3. acute infectious encephalopathy  4. Sigmoid mass with suspected metastatic disease  5. debility/ deconditioning  6. hypothyroidism  7. DM T II  8. Parkinson's Disease    zosyn, ID consult appreciated  CEA not elevated. s/p liver biopsy today  -repeat blood cultures today  appreciate discussion with hematology-oncology and palliative care  input and management from GI and general surgery noted, planned for limited sigmoidoscopy for tomorrow  surgical oncology consulted  c/w home dose of synthroid and sinemet  dysphagia diet  dvt ppx, fall precautions with bed alarm  rest of plan as above .   *Interesting article : Association Between Bacteremia From Specific Microbes and Subsequent Diagnosis of Colorectal Cancer. Gastroenterology. 2018 Aug;155(2):383-390.e8. doi: [email or web address]. Epub 2018 May 3.

## 2019-03-11 NOTE — PROGRESS NOTE ADULT - PROBLEM SELECTOR PLAN 2
- has palpable mass in Rt. lower abdomen, mildly tender, had bloody bowel movement before 3 nights, now no bowel complaints  - CT abdomen showed Multiple liver lesions consistent with metastases and large mass in the lower abdomen/upper pelvis surrounding the sigmoid   colon which may be secondary to just tumor, lymphoma or adenocarcinoma.   -  CT chest shows no mets with Bilateral pleural effusions and subsegmental atelectasis of basilar right lower lobe.  - occult negative  - normal CEA, CA 19-9,  - GI Dr. Murdock consulted- advised sigmoidoscopy for diagnosis and biopsy on monday if family agrees, NPO after MN, fleet enema in AM  - Hem-onc Dr. Camargo consulted  - Palliative consulted- PT. DNR/DNI - has palpable mass in Rt. lower abdomen, mildly tender, had bloody bowel movement before 3 nights, now no bowel complaints  - CT abdomen showed Multiple liver lesions consistent with metastases and large mass in the lower abdomen/upper pelvis surrounding the sigmoid   colon which may be secondary to just tumor, lymphoma or adenocarcinoma.   -  CT chest shows no mets with Bilateral pleural effusions and subsegmental atelectasis of basilar right lower lobe.  - occult negative  - normal CEA, CA 19-9,  - GI Dr. Murdock consulted- advised sigmoidoscopy for diagnosis and biopsy on monday if family agrees, NPO after MN, fleet enema in AM  - Hem-onc Dr. Camargo consulted  - Palliative consulted- PT. DNR/DNI  Pt to have Sigmoidoscopy tomorrow.

## 2019-03-11 NOTE — CONSULT NOTE ADULT - PROBLEM SELECTOR RECOMMENDATION 9
Sigmoid mass, c/w Cancer  Lymphoma or GIST.  Discussed with Dr Rowell.  Bacteria unlikely to be from Sigmoid mass.  No obstruction, bleeding, fistula.  FU Onc  FU Palliative Med  FU IR Bx of Hepatic Mets

## 2019-03-11 NOTE — PROGRESS NOTE ADULT - SUBJECTIVE AND OBJECTIVE BOX
PGY1 Note discussed with supervising resident and primary attending.    Patient is a 83y old  Male who presents with a chief complaint of weakness, lethargy (10 Mar 2019 19:37)      INTERVAL HPI/OVERNIGHT EVENTS:     MEDICATIONS  (STANDING):  artificial  tears Solution 1 Drop(s) Left EYE three times a day  carbidopa/levodopa  10/100 1 Tablet(s) Oral <User Schedule>  cholecalciferol 1000 Unit(s) Oral daily  dextrose 5% + sodium chloride 0.45%. 1000 milliLiter(s) (75 mL/Hr) IV Continuous <Continuous>  dextrose 5% + sodium chloride 0.9%. 1000 milliLiter(s) (75 mL/Hr) IV Continuous <Continuous>  erythromycin   Ointment 1 Application(s) Right EYE two times a day  insulin lispro (HumaLOG) corrective regimen sliding scale   SubCutaneous three times a day before meals  levothyroxine 100 MICROGram(s) Oral daily  piperacillin/tazobactam IVPB. 3.375 Gram(s) IV Intermittent every 8 hours  potassium chloride    Tablet ER 40 milliEquivalent(s) Oral every 4 hours  tamsulosin 0.4 milliGRAM(s) Oral at bedtime    MEDICATIONS  (PRN):  acetaminophen   Tablet .. 650 milliGRAM(s) Oral every 6 hours PRN Temp greater or equal to 38C (100.4F)      Allergies    No Known Allergies    Intolerances        REVIEW OF SYSTEMS:  CONSTITUTIONAL: No fever, weight loss, or fatigue  RESPIRATORY: No cough, wheezing, chills or hemoptysis; No shortness of breath  CARDIOVASCULAR: No chest pain, palpitations, dizziness, or leg swelling  GASTROINTESTINAL: No abdominal or epigastric pain. No nausea, vomiting, or hematemesis; No diarrhea or constipation. No melena or hematochezia.  NEUROLOGICAL: No headaches, memory loss, loss of strength, numbness, or tremors  SKIN: No itching, burning, rashes, or lesions     Vital Signs Last 24 Hrs  T(C): 37.2 (11 Mar 2019 08:25), Max: 37.2 (11 Mar 2019 08:25)  T(F): 98.9 (11 Mar 2019 08:25), Max: 98.9 (11 Mar 2019 08:25)  HR: 76 (11 Mar 2019 08:25) (68 - 76)  BP: 126/60 (11 Mar 2019 08:25) (111/58 - 126/60)  BP(mean): --  RR: 16 (11 Mar 2019 08:25) (16 - 16)  SpO2: 98% (11 Mar 2019 08:25) (96% - 98%)    PHYSICAL EXAM:  GENERAL: NAD, well-groomed, well-developed  HEAD:  Atraumatic, Normocephalic  EYES: EOMI, PERRLA, conjunctiva and sclera clear  NECK: Supple, No JVD, Normal thyroid  CHEST/LUNG: Clear to percussion bilaterally; No rales, rhonchi, wheezing, or rubs  HEART: Regular rate and rhythm; No murmurs, rubs, or gallops  ABDOMEN: Soft, Nontender, Nondistended; Bowel sounds present  NERVOUS SYSTEM:  Alert & Oriented X3, Good concentration; Motor Strength 5/5 B/L   EXTREMITIES:  2+ Peripheral Pulses, No clubbing, cyanosis, or edema  SKIN;    LABS:                        9.5    15.46 )-----------( 245      ( 11 Mar 2019 06:51 )             29.6     03-11    133<L>  |  101  |  14  ----------------------------<  113<H>  3.3<L>   |  25  |  0.51    Ca    8.1<L>      11 Mar 2019 06:51          CAPILLARY BLOOD GLUCOSE      POCT Blood Glucose.: 114 mg/dL (11 Mar 2019 13:02)  POCT Blood Glucose.: 129 mg/dL (11 Mar 2019 08:46)  POCT Blood Glucose.: 164 mg/dL (10 Mar 2019 21:22)  POCT Blood Glucose.: 210 mg/dL (10 Mar 2019 16:47)      RADIOLOGY & ADDITIONAL TESTS:    Imaging Personally Reviewed:  [ ] YES  [ ] NO    Consultant(s) Notes Reviewed:  [ ] YES  [ ] NO PGY1 Note discussed with supervising resident and primary attending.    Patient is a 83y old  Male who presents with a chief complaint of weakness, lethargy (10 Mar 2019 19:37)      INTERVAL HPI/OVERNIGHT EVENTS:   pt seen and examined at bedside.  Pt underwent IR guided liver biopsy today.  we will hold lovenox for 48 hours.  Pt to undergo sigmoidoscopy tomorrow.  NPO after midnight.  Blood cultures from 03/09/2019 is growing gram negative rods.  pt to continue zosyn and have repeat blood cultures drawn today.  we will monitor site for hematoma.    MEDICATIONS  (STANDING):  artificial  tears Solution 1 Drop(s) Left EYE three times a day  carbidopa/levodopa  10/100 1 Tablet(s) Oral <User Schedule>  cholecalciferol 1000 Unit(s) Oral daily  dextrose 5% + sodium chloride 0.45%. 1000 milliLiter(s) (75 mL/Hr) IV Continuous <Continuous>  dextrose 5% + sodium chloride 0.9%. 1000 milliLiter(s) (75 mL/Hr) IV Continuous <Continuous>  erythromycin   Ointment 1 Application(s) Right EYE two times a day  insulin lispro (HumaLOG) corrective regimen sliding scale   SubCutaneous three times a day before meals  levothyroxine 100 MICROGram(s) Oral daily  piperacillin/tazobactam IVPB. 3.375 Gram(s) IV Intermittent every 8 hours  potassium chloride    Tablet ER 40 milliEquivalent(s) Oral every 4 hours  tamsulosin 0.4 milliGRAM(s) Oral at bedtime    MEDICATIONS  (PRN):  acetaminophen   Tablet .. 650 milliGRAM(s) Oral every 6 hours PRN Temp greater or equal to 38C (100.4F)      Allergies    No Known Allergies    Intolerances        REVIEW OF SYSTEMS:  CONSTITUTIONAL: No fever, weight loss, or fatigue  RESPIRATORY: No cough, wheezing, chills or hemoptysis; No shortness of breath  CARDIOVASCULAR: No chest pain, palpitations, dizziness, or leg swelling  GASTROINTESTINAL: No abdominal or epigastric pain. No nausea, vomiting, or hematemesis; No diarrhea or constipation. No melena or hematochezia.  NEUROLOGICAL: No headaches, memory loss, loss of strength, numbness, or tremors  SKIN: No itching, burning, rashes, or lesions     Vital Signs Last 24 Hrs  T(C): 37.2 (11 Mar 2019 08:25), Max: 37.2 (11 Mar 2019 08:25)  T(F): 98.9 (11 Mar 2019 08:25), Max: 98.9 (11 Mar 2019 08:25)  HR: 76 (11 Mar 2019 08:25) (68 - 76)  BP: 126/60 (11 Mar 2019 08:25) (111/58 - 126/60)  BP(mean): --  RR: 16 (11 Mar 2019 08:25) (16 - 16)  SpO2: 98% (11 Mar 2019 08:25) (96% - 98%)    PHYSICAL EXAM:  GENERAL: NAD,   HEAD:  Atraumatic, Normocephalic  EYES: EOMI, PERRLA, conjunctiva and sclera clear  NECK: Supple, No JVD, Normal thyroid  CHEST/LUNG: Clear to percussion bilaterally;  HEART: Regular rate and rhythm; No murmurs, rubs, or gallops  ABDOMEN: Soft, Nontender, Nondistended; Bowel sounds present  NERVOUS SYSTEM:  Alert & Oriented X3,  EXTREMITIES:  2+ Peripheral Pulses, No clubbing, cyanosis, or edema      LABS:                        9.5    15.46 )-----------( 245      ( 11 Mar 2019 06:51 )             29.6     03-11    133<L>  |  101  |  14  ----------------------------<  113<H>  3.3<L>   |  25  |  0.51    Ca    8.1<L>      11 Mar 2019 06:51          CAPILLARY BLOOD GLUCOSE      POCT Blood Glucose.: 114 mg/dL (11 Mar 2019 13:02)  POCT Blood Glucose.: 129 mg/dL (11 Mar 2019 08:46)  POCT Blood Glucose.: 164 mg/dL (10 Mar 2019 21:22)  POCT Blood Glucose.: 210 mg/dL (10 Mar 2019 16:47)      RADIOLOGY & ADDITIONAL TESTS:< from: IR US Guided Needle Placement (03.11.19 @ 13:18) >    EXAM:  IR PROCEDURE                          EXAM:  SP US GUID PLC NDL SI                            PROCEDURE DATE:  03/11/2019          INTERPRETATION:  DICTATING PHYSICIAN: Marcin Tellez MD    PROCEDURE: Percutaneous ultrasound-guided targeted liver biopsy biopsy     DATE: 3/11/2019  COMPARISON: CT abdomen 3/6/2019     CLINICAL HISTORY:  83-year-old male with a large colonic mass multiple   metastatic lesions throughout the liver.    Physicians:   1.  Marcin Tellez MD     FINDINGS:  1.  Multiple target-like lesions throughout the liver under limited   ultrasound examination.    MEDICATIONS: Lidocaine 1% subcutaneous    TECHNIQUE:   Written informed consent was obtained from the patient after thorough   discussion of the risks, benefits, and alternatives to the procedure. All   questions from the patient were answered appropriately and thoroughly.   The patient was then brought back to the interventional radiology suite   and placed supine on the table. A timeout was performed prior to the   procedure to verify the patient's name, birthdate, and site and type of   procedure to be performed.    The right upper quadrant was studied using ultrasound and demonstrated   multiple intrahepatic lesions. A suitable site for skin puncture was   identified. The skin of the right upper quadrant was then prepped and   draped in the usual sterile fashion. Maximum sterile barrier technique   was observed throughout the procedure. The skin was anesthetized with 1%   lidocaine. Using ultrasound guidance, a 17-gauge coaxial needle was   advanced into a target lesion. Multiple passes were made through the mass   with an 18-gauge spring-loaded biopsy device. Representative ultrasound   images were obtained for documentation. The sample was provided to the   on-site pathologist who determined the adequacy of the sample. A total of   3 core samples were acquired in similar fashion until a technically   adequate sample was obtained. D-Stat hemostatic agent was injected into   theneedle and the needle was subsequently removed. Hemostasis was   achieved through manual compression and a skin adhesive bandage was   applied at the skin puncture site.    There were no immediate complications and the patient tolerated the   procedure well.    Conclusion:  1.  Successful and uncomplicated percutaneous ultrasound-guided biopsy of   an intrahepatic lesion within the right hepatic lobe with a technically   adequate sample provided for analysis.    < end of copied text >      Imaging Personally Reviewed:  [x] YES  [ ] NO    Consultant(s) Notes Reviewed:  [x] YES  [ ] NO

## 2019-03-11 NOTE — CHART NOTE - NSCHARTNOTEFT_GEN_A_CORE
Interventional Radiology Brief- Operative Note    Operators: Marcin Tellez MD    Procedure: US guided targeted liver biopsy    Post-op Dx: Colonic mass with multiple metastatic hepatic lesions    EBL: 5 mL    Medications: 1% lidocaine    Contrast: 0 mL    Complications: no immediate complications    Findings/Plan:  Limited US examination demonstrates multiple hepatic lesions   Successful biopsy of a hepatic lesion with 3 core specimens obtained for analysis    Post-procedure management after liver biopsy  1.	Hemodynamic monitoring for at least 2 hours  2.	Bedrest for 4 hours with biopsy side down  3.	Monitor for potential hematoma at the biopsy site Interventional Radiology Brief- Operative Note    Operators: Marcin Tellez MD    Procedure: US guided targeted liver biopsy    Post-op Dx: Colonic mass with multiple metastatic hepatic lesions    EBL: 5 mL    Medications: 1% lidocaine    Contrast: 0 mL    Complications: no immediate complications    Findings/Plan:  Limited US examination demonstrates multiple hepatic lesions   Successful biopsy of a hepatic lesion with 3 core specimens obtained for analysis    Post-procedure management after liver biopsy  1.	Hemodynamic monitoring for at least 2 hours  2.	Bedrest for 4 hours with biopsy side down  3.	Monitor for potential hematoma at the biopsy site  4.           OK to restart enoxaparin in 48 hrs

## 2019-03-11 NOTE — PROGRESS NOTE ADULT - PROBLEM SELECTOR PLAN 9
- A1C 6.7  - will start on hss  - diabetic education
- no previous history of diabetes  - A1C 6.7  - will start on hss  - diabetic education
Monitor f/s  Continue Insulin  diabetic education
- no previous history of diabetes  - A1C 6.7  - will start on hss

## 2019-03-12 ENCOUNTER — RESULT REVIEW (OUTPATIENT)
Age: 84
End: 2019-03-12

## 2019-03-12 LAB
ANION GAP SERPL CALC-SCNC: 7 MMOL/L — SIGNIFICANT CHANGE UP (ref 5–17)
BUN SERPL-MCNC: 15 MG/DL — SIGNIFICANT CHANGE UP (ref 7–18)
CALCIUM SERPL-MCNC: 8.2 MG/DL — LOW (ref 8.4–10.5)
CHLORIDE SERPL-SCNC: 100 MMOL/L — SIGNIFICANT CHANGE UP (ref 96–108)
CO2 SERPL-SCNC: 26 MMOL/L — SIGNIFICANT CHANGE UP (ref 22–31)
CREAT SERPL-MCNC: 0.57 MG/DL — SIGNIFICANT CHANGE UP (ref 0.5–1.3)
CULTURE RESULTS: SIGNIFICANT CHANGE UP
CULTURE RESULTS: SIGNIFICANT CHANGE UP
GLUCOSE SERPL-MCNC: 109 MG/DL — HIGH (ref 70–99)
HCT VFR BLD CALC: 31.2 % — LOW (ref 39–50)
HGB BLD-MCNC: 9.9 G/DL — LOW (ref 13–17)
MCHC RBC-ENTMCNC: 24.6 PG — LOW (ref 27–34)
MCHC RBC-ENTMCNC: 31.7 GM/DL — LOW (ref 32–36)
MCV RBC AUTO: 77.6 FL — LOW (ref 80–100)
NRBC # BLD: 0 /100 WBCS — SIGNIFICANT CHANGE UP (ref 0–0)
PLATELET # BLD AUTO: 299 K/UL — SIGNIFICANT CHANGE UP (ref 150–400)
POTASSIUM SERPL-MCNC: 3.9 MMOL/L — SIGNIFICANT CHANGE UP (ref 3.5–5.3)
POTASSIUM SERPL-SCNC: 3.9 MMOL/L — SIGNIFICANT CHANGE UP (ref 3.5–5.3)
RBC # BLD: 4.02 M/UL — LOW (ref 4.2–5.8)
RBC # FLD: 15.5 % — HIGH (ref 10.3–14.5)
SODIUM SERPL-SCNC: 133 MMOL/L — LOW (ref 135–145)
SPECIMEN SOURCE: SIGNIFICANT CHANGE UP
SPECIMEN SOURCE: SIGNIFICANT CHANGE UP
WBC # BLD: 12.65 K/UL — HIGH (ref 3.8–10.5)
WBC # FLD AUTO: 12.65 K/UL — HIGH (ref 3.8–10.5)

## 2019-03-12 PROCEDURE — 99233 SBSQ HOSP IP/OBS HIGH 50: CPT | Mod: GC

## 2019-03-12 PROCEDURE — 88305 TISSUE EXAM BY PATHOLOGIST: CPT | Mod: 26

## 2019-03-12 PROCEDURE — 45331 SIGMOIDOSCOPY AND BIOPSY: CPT

## 2019-03-12 RX ADMIN — Medication 100 MICROGRAM(S): at 06:42

## 2019-03-12 RX ADMIN — CARBIDOPA AND LEVODOPA 1 TABLET(S): 25; 100 TABLET ORAL at 17:23

## 2019-03-12 RX ADMIN — PIPERACILLIN AND TAZOBACTAM 25 GRAM(S): 4; .5 INJECTION, POWDER, LYOPHILIZED, FOR SOLUTION INTRAVENOUS at 13:32

## 2019-03-12 RX ADMIN — Medication 1 DROP(S): at 06:42

## 2019-03-12 RX ADMIN — Medication 1000 UNIT(S): at 12:20

## 2019-03-12 RX ADMIN — PIPERACILLIN AND TAZOBACTAM 25 GRAM(S): 4; .5 INJECTION, POWDER, LYOPHILIZED, FOR SOLUTION INTRAVENOUS at 21:46

## 2019-03-12 RX ADMIN — CARBIDOPA AND LEVODOPA 1 TABLET(S): 25; 100 TABLET ORAL at 12:20

## 2019-03-12 RX ADMIN — PIPERACILLIN AND TAZOBACTAM 25 GRAM(S): 4; .5 INJECTION, POWDER, LYOPHILIZED, FOR SOLUTION INTRAVENOUS at 06:42

## 2019-03-12 RX ADMIN — Medication 1 DROP(S): at 13:32

## 2019-03-12 RX ADMIN — Medication 1 APPLICATION(S): at 06:42

## 2019-03-12 RX ADMIN — CARBIDOPA AND LEVODOPA 1 TABLET(S): 25; 100 TABLET ORAL at 06:42

## 2019-03-12 RX ADMIN — Medication 1 DROP(S): at 21:46

## 2019-03-12 RX ADMIN — TAMSULOSIN HYDROCHLORIDE 0.4 MILLIGRAM(S): 0.4 CAPSULE ORAL at 21:46

## 2019-03-12 RX ADMIN — Medication 1 APPLICATION(S): at 17:22

## 2019-03-12 NOTE — PROGRESS NOTE ADULT - SUBJECTIVE AND OBJECTIVE BOX
INTERVAL HPI/OVERNIGHT EVENTS:    Pt seen and examined at bedside. Offers no acute complaints at this time, denies abd pain, no nausea or vomiting. +BM/Flatus.   Pt is NPO for sigmoidoscopy w/ Dr Abreu.       Vital Signs Last 24 Hrs  T(C): 36.9 (12 Mar 2019 05:33), Max: 37.2 (11 Mar 2019 08:25)  T(F): 98.5 (12 Mar 2019 05:33), Max: 98.9 (11 Mar 2019 08:25)  HR: 78 (12 Mar 2019 05:33) (71 - 78)  BP: 113/60 (12 Mar 2019 05:33) (100/62 - 126/60)  BP(mean): --  RR: 16 (12 Mar 2019 00:17) (16 - 16)  SpO2: 97% (12 Mar 2019 00:17) (97% - 98%)  I&O's Detail    piperacillin/tazobactam IVPB. 3.375 Gram(s) IV Intermittent every 8 hours      Physical Exam  General: AAOx3, No acute distress  Skin: No jaundice, no icterus  Abdomen: soft,  nondistended, nontender, no rebound tenderness, no guarding, no palpable masses  Extremities: non edematous, no calf pain bilaterally      Labs:                        9.9    12.65 )-----------( 299      ( 12 Mar 2019 07:12 )             31.2     03-11    133<L>  |  101  |  14  ----------------------------<  113<H>  3.3<L>   |  25  |  0.51    Ca    8.1<L>      11 Mar 2019 06:51

## 2019-03-12 NOTE — PROGRESS NOTE ADULT - SUBJECTIVE AND OBJECTIVE BOX
PGY1 Note discussed with supervising resident and primary attending.    Patient is a 83y old  Male who presents with a chief complaint of weakness, lethargy (12 Mar 2019 07:28)      INTERVAL HPI/OVERNIGHT EVENTS:    MEDICATIONS  (STANDING):  artificial  tears Solution 1 Drop(s) Left EYE three times a day  carbidopa/levodopa  10/100 1 Tablet(s) Oral <User Schedule>  cholecalciferol 1000 Unit(s) Oral daily  erythromycin   Ointment 1 Application(s) Right EYE two times a day  insulin lispro (HumaLOG) corrective regimen sliding scale   SubCutaneous three times a day before meals  levothyroxine 100 MICROGram(s) Oral daily  piperacillin/tazobactam IVPB. 3.375 Gram(s) IV Intermittent every 8 hours  tamsulosin 0.4 milliGRAM(s) Oral at bedtime    MEDICATIONS  (PRN):  acetaminophen   Tablet .. 650 milliGRAM(s) Oral every 6 hours PRN Temp greater or equal to 38C (100.4F)      Allergies    No Known Allergies    Intolerances        REVIEW OF SYSTEMS:  CONSTITUTIONAL: No fever, weight loss, or fatigue  RESPIRATORY: No cough, wheezing, chills or hemoptysis; No shortness of breath  CARDIOVASCULAR: No chest pain, palpitations, dizziness, or leg swelling  GASTROINTESTINAL: No abdominal or epigastric pain. No nausea, vomiting, or hematemesis; No diarrhea or constipation. No melena or hematochezia.  NEUROLOGICAL: No headaches, memory loss, loss of strength, numbness, or tremors  SKIN: No itching, burning, rashes, or lesions     Vital Signs Last 24 Hrs  T(C): 36.7 (12 Mar 2019 08:21), Max: 36.9 (12 Mar 2019 05:33)  T(F): 98 (12 Mar 2019 08:21), Max: 98.5 (12 Mar 2019 05:33)  HR: 68 (12 Mar 2019 14:07) (68 - 78)  BP: 110/58 (12 Mar 2019 14:07) (100/62 - 114/63)  BP(mean): --  RR: 16 (12 Mar 2019 08:21) (16 - 16)  SpO2: 98% (12 Mar 2019 14:07) (97% - 98%)    PHYSICAL EXAM:  GENERAL: NAD, well-groomed, well-developed  HEAD:  Atraumatic, Normocephalic  EYES: EOMI, PERRLA, conjunctiva and sclera clear  NECK: Supple, No JVD, Normal thyroid  CHEST/LUNG: Clear to percussion bilaterally; No rales, rhonchi, wheezing, or rubs  HEART: Regular rate and rhythm; No murmurs, rubs, or gallops  ABDOMEN: Soft, Nontender, Nondistended; Bowel sounds present  NERVOUS SYSTEM:  Alert & Oriented X3, Good concentration; Motor Strength 5/5 B/L   EXTREMITIES:  2+ Peripheral Pulses, No clubbing, cyanosis, or edema  SKIN;    LABS:                        9.9    12.65 )-----------( 299      ( 12 Mar 2019 07:12 )             31.2     03-12    133<L>  |  100  |  15  ----------------------------<  109<H>  3.9   |  26  |  0.57    Ca    8.2<L>      12 Mar 2019 07:12          CAPILLARY BLOOD GLUCOSE      POCT Blood Glucose.: 113 mg/dL (12 Mar 2019 11:52)  POCT Blood Glucose.: 118 mg/dL (12 Mar 2019 08:46)  POCT Blood Glucose.: 141 mg/dL (11 Mar 2019 21:23)  POCT Blood Glucose.: 145 mg/dL (11 Mar 2019 16:34)      RADIOLOGY & ADDITIONAL TESTS:    Imaging Personally Reviewed:  [ ] YES  [ ] NO    Consultant(s) Notes Reviewed:  [ ] YES  [ ] NO PGY1 Note discussed with supervising resident and primary attending.    Patient is a 83y old  Male who presents with a chief complaint of weakness, lethargy (12 Mar 2019 07:28)      INTERVAL HPI/OVERNIGHT EVENTS:  pt seen and examined at bedside.  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  pt is currently undergoing sigmoidoscopy for biopsy of sigmoid colon mass.  Pt is afebrile with downtrending leukocytosis.  We are awaiting repeat blood culture.    MEDICATIONS  (STANDING):  artificial  tears Solution 1 Drop(s) Left EYE three times a day  carbidopa/levodopa  10/100 1 Tablet(s) Oral <User Schedule>  cholecalciferol 1000 Unit(s) Oral daily  erythromycin   Ointment 1 Application(s) Right EYE two times a day  insulin lispro (HumaLOG) corrective regimen sliding scale   SubCutaneous three times a day before meals  levothyroxine 100 MICROGram(s) Oral daily  piperacillin/tazobactam IVPB. 3.375 Gram(s) IV Intermittent every 8 hours  tamsulosin 0.4 milliGRAM(s) Oral at bedtime    MEDICATIONS  (PRN):  acetaminophen   Tablet .. 650 milliGRAM(s) Oral every 6 hours PRN Temp greater or equal to 38C (100.4F)      Allergies    No Known Allergies    Intolerances        REVIEW OF SYSTEMS:  CONSTITUTIONAL: No fever, weight loss, or fatigue  RESPIRATORY: No cough, wheezing, chills or hemoptysis; No shortness of breath  CARDIOVASCULAR: No chest pain, palpitations, dizziness, or leg swelling  GASTROINTESTINAL: No abdominal or epigastric pain. No nausea, vomiting, or hematemesis; No diarrhea or constipation. No melena or hematochezia.  NEUROLOGICAL: No headaches, memory loss, loss of strength, numbness, or tremors  SKIN: No itching, burning, rashes, or lesions     Vital Signs Last 24 Hrs  T(C): 36.7 (12 Mar 2019 08:21), Max: 36.9 (12 Mar 2019 05:33)  T(F): 98 (12 Mar 2019 08:21), Max: 98.5 (12 Mar 2019 05:33)  HR: 68 (12 Mar 2019 14:07) (68 - 78)  BP: 110/58 (12 Mar 2019 14:07) (100/62 - 114/63)  BP(mean): --  RR: 16 (12 Mar 2019 08:21) (16 - 16)  SpO2: 98% (12 Mar 2019 14:07) (97% - 98%)    PHYSICAL EXAM:  GENERAL: NAD, well-groomed, well-developed  HEAD:  Atraumatic, Normocephalic  EYES: EOMI, PERRLA, conjunctiva and sclera clear  NECK: Supple, No JVD, Normal thyroid  CHEST/LUNG: Clear to percussion bilaterally; No rales, rhonchi, wheezing, or rubs  HEART: Regular rate and rhythm; No murmurs, rubs, or gallops  ABDOMEN: Soft, Nontender, Nondistended; Bowel sounds present  NERVOUS SYSTEM:  Alert & Oriented X3, Good concentration; Motor Strength 5/5 B/L   EXTREMITIES:  2+ Peripheral Pulses, No clubbing, cyanosis, or edema  SKIN;    LABS:                        9.9    12.65 )-----------( 299      ( 12 Mar 2019 07:12 )             31.2     03-12    133<L>  |  100  |  15  ----------------------------<  109<H>  3.9   |  26  |  0.57    Ca    8.2<L>      12 Mar 2019 07:12          CAPILLARY BLOOD GLUCOSE      POCT Blood Glucose.: 113 mg/dL (12 Mar 2019 11:52)  POCT Blood Glucose.: 118 mg/dL (12 Mar 2019 08:46)  POCT Blood Glucose.: 141 mg/dL (11 Mar 2019 21:23)  POCT Blood Glucose.: 145 mg/dL (11 Mar 2019 16:34)      RADIOLOGY & ADDITIONAL TESTS:Surgical Pathology Report (03.11.19 @ 11:00)    Surgical Pathology Report:   ACCESSION No:  [id number]    TONE ACKERMAN                           1        Surgical Final Report          Final Diagnosis    Liver biopsy:  Spindle cell neoplasm  - Classification pending immunohistochemical  analysis    Verified by: Annemarie Pineda M.D.  (Electronic Signature)  Reported on: 03/12/19 11:03 EDT, [address] [place] [id number]  _________________________________________________________________    Comment  Specimen Adequacy:  - Diagnostic material is present.  Dr. Annemarie Pineda (3-11-19)    Clinical History  83-year-old male with history of liver metastases,HTN,CHF,  Parkinson's, liver mass    Specimen(s) Submitted  Liver, biopsy    Gross Description  The specimen is received in formalin and the specimen container  is labeled: Liver biopsy.  It consists of three cylindrical,  soft, tan-pink fragments of tissue measuring from 1.0 cm to 1.1  cm in length and each with an average diameter of 0.1 cm.  Entirely submitted.  One cassette.    In addition to other data that may appear on the specimen  container, the label has been inspected to confirm the presence  of the patient's name and date of birth.  Florentino Cash 03/11/2019 15:44        Imaging Personally Reviewed:  [x] YES  [ ] NO    Consultant(s) Notes Reviewed:  [x] YES  [ ] NO

## 2019-03-12 NOTE — PROGRESS NOTE ADULT - ASSESSMENT
· Assessment	  Patient admitted for fatigue, increased lethargy, suspected sepsis given hypothermia and clinical condition, s/p Iv fluids and Zosyn in ED, and treated for suspected hypothyroid state, patient also noted to have colonic mass with multiple masses in liver, suspected for liver metastasis. Blood culture grew gm -ve bacteria, pending final result.     Problem/Plan - 1:  ·  Problem: Bacteremia.  Plan: - p/w hypothermia and hypotension, s/p code sepsis  - leukocytosis trending up, afebrile overnight  - blood culture shows bacteroides fragilis likely due to colonic mass  - on zosyn D5  - UA and CXR, CT head negative  - repeat blood culture from 03/09 growing gram neg rods.  repeat blood cultures sent yesterday  - ID Dr. Artis.      Problem/Plan - 2:  ·  Problem: Colonic mass.  Plan: - has palpable mass in Rt. lower abdomen, mildly tender, had bloody bowel movement before 3 nights, now no bowel complaints  - CT abdomen showed Multiple liver lesions consistent with metastases and large mass in the lower abdomen/upper pelvis surrounding the sigmoid   colon which may be secondary to just tumor, lymphoma or adenocarcinoma.   -  CT chest shows no mets with Bilateral pleural effusions and subsegmental atelectasis of basilar right lower lobe.  - occult negative  - normal CEA, CA 19-9,  - GI Dr. Murdock consulted- advised sigmoidoscopy for diagnosis and biopsy on monday if family agrees, NPO after MN, fleet enema in AM  - Hem-onc Dr. Camargo consulted  - Palliative consulted- PT. DNR/DNI  Pt to have Sigmoidoscopy today.     Problem/Plan - 3:  ·  Problem: Hypothyroidism.  Plan: p/w hypothermia of  temp of 94.7 rectally and hypotension, likely 2/2 hypothyroid state precipitated by missed dose and malignancy vs sepsis  s/p Hydrocortisone 50 mg IV x 1 dose then IV synthroid 50 microgm,  c/w synthroid 100mcg daily PO, (home dose)  pt. missed dose for 4 days  TSH within normal limits  normal free T4.      Problem/Plan - 4:  ·  Problem: Anemia.  Plan: occult negative  iron profile shows anemia of chronic disease with elevated ferritin likely due to malignancy  transfuse if Hb <7.      Problem/Plan - 5:  ·  Problem: Parkinson's disease.  Plan: continue with sinemet if patient able to tolerate PO at home dose  sinemet 10/100 TID.      Problem/Plan - 6:  Problem: Hypertension. Plan: hold off antihypertensives for now   BP well controlled off meds  continue to monitor BP.     Problem/Plan - 7:  ·  Problem: CHF (congestive heart failure).  Plan: not in exacerbation  patient on atenolol at home, will hold for now in setting of hypotension  normal echo with GII DD.      Problem/Plan - 8:  ·  Problem: Sacral decubitus ulcer, stage II.  Plan: does not look infected, cover daily, clean with NS  wound care consulted.      Problem/Plan - 9:  ·  Problem: Diabetes.  Plan: - no previous history of diabetes  - A1C 6.7  - will start on hss  - diabetic education.      Problem/Plan - 10:  Problem: Need for prophylactic measure. Plan; IMPROVE VTE Individual Risk Assessment          RISK                                                          Points  [  ] Previous VTE                                                3  [  ] Thrombophilia                                             2  [  ] Lower limb paralysis                                   2        (unable to hold up >15 seconds)    [  ] Current Cancer                                             2         (within 6 months)  [  x] Immobilization > 24 hrs                              1  [  ] ICU/CCU stay > 24 hours                             1  [ x ] Age > 60                                                         1    IMPROVE VTE Score: 2    lovenox for vte prophylaxis.

## 2019-03-12 NOTE — PROGRESS NOTE ADULT - ATTENDING COMMENTS
Patient seen/evaluated at bedside 3/12/19. I agree with the resident progress note/outlined plan of care. My independent findings and conclusions are documented.    (+)bm today. no nausea/vomiting. 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 Camargo of findings. Full stained results are pending. No fevers/chills    1. Sepsis present on admission (with hypothermia, hypotension)  2. Bacteroides Fragilis Bacteremia  3. acute infectious encephalopathy  4. 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 debility/ deconditioning  6. hypothyroidism  7. DM T II  8. Parkinson's Disease    zosyn, ID consult appreciated  -repeat blood cultures pending, will f/u results  CEA not elevated. s/p liver biopsy revealing spindle cell neoplasm.   appreciate discussion with hematology-oncology and palliative care  input and management from GI and general surgery noted,  f/u sigmoidoscopy biopsy results  surgical oncology consulted  c/w home dose of synthroid and sinemet  dysphagia diet  dvt ppx, fall precautions with bed alarm  rest of plan as above .

## 2019-03-12 NOTE — CHART NOTE - NSCHARTNOTEFT_GEN_A_CORE
See colonoscopy report in hard copy chart:    Summary:     Large circumferential mass at sigmoid colon. Biopsies taken

## 2019-03-12 NOTE — PROGRESS NOTE ADULT - ASSESSMENT
84 y/o Male w/ Sigmoid colon mass, no obstruction, Multiple hepatic masses/ metastatic disease; bacteremia     -F/u sigmoidoscopy w/ Dr Abreu   -F/u Pathology liver mass bx by IR   -C/w Abx as per ID   -F/u Oncology   -Rec Palliative care

## 2019-03-13 LAB
ANION GAP SERPL CALC-SCNC: 9 MMOL/L — SIGNIFICANT CHANGE UP (ref 5–17)
BUN SERPL-MCNC: 15 MG/DL — SIGNIFICANT CHANGE UP (ref 7–18)
CALCIUM SERPL-MCNC: 8.1 MG/DL — LOW (ref 8.4–10.5)
CHLORIDE SERPL-SCNC: 98 MMOL/L — SIGNIFICANT CHANGE UP (ref 96–108)
CO2 SERPL-SCNC: 25 MMOL/L — SIGNIFICANT CHANGE UP (ref 22–31)
CREAT SERPL-MCNC: 0.57 MG/DL — SIGNIFICANT CHANGE UP (ref 0.5–1.3)
GLUCOSE SERPL-MCNC: 116 MG/DL — HIGH (ref 70–99)
HCT VFR BLD CALC: 30 % — LOW (ref 39–50)
HGB BLD-MCNC: 9.4 G/DL — LOW (ref 13–17)
MCHC RBC-ENTMCNC: 24.4 PG — LOW (ref 27–34)
MCHC RBC-ENTMCNC: 31.3 GM/DL — LOW (ref 32–36)
MCV RBC AUTO: 77.7 FL — LOW (ref 80–100)
NRBC # BLD: 0 /100 WBCS — SIGNIFICANT CHANGE UP (ref 0–0)
OSMOLALITY SERPL: 271 MOS/KG — LOW (ref 275–300)
PLATELET # BLD AUTO: 317 K/UL — SIGNIFICANT CHANGE UP (ref 150–400)
POTASSIUM SERPL-MCNC: 3.8 MMOL/L — SIGNIFICANT CHANGE UP (ref 3.5–5.3)
POTASSIUM SERPL-SCNC: 3.8 MMOL/L — SIGNIFICANT CHANGE UP (ref 3.5–5.3)
RBC # BLD: 3.86 M/UL — LOW (ref 4.2–5.8)
RBC # FLD: 15.6 % — HIGH (ref 10.3–14.5)
SODIUM SERPL-SCNC: 132 MMOL/L — LOW (ref 135–145)
WBC # BLD: 13.81 K/UL — HIGH (ref 3.8–10.5)
WBC # FLD AUTO: 13.81 K/UL — HIGH (ref 3.8–10.5)

## 2019-03-13 PROCEDURE — 99233 SBSQ HOSP IP/OBS HIGH 50: CPT | Mod: GC

## 2019-03-13 RX ORDER — ENOXAPARIN SODIUM 100 MG/ML
40 INJECTION SUBCUTANEOUS DAILY
Qty: 0 | Refills: 0 | Status: DISCONTINUED | OUTPATIENT
Start: 2019-03-13 | End: 2019-03-18

## 2019-03-13 RX ADMIN — Medication 1 APPLICATION(S): at 17:52

## 2019-03-13 RX ADMIN — Medication 1 DROP(S): at 05:57

## 2019-03-13 RX ADMIN — Medication 1 DROP(S): at 22:01

## 2019-03-13 RX ADMIN — Medication 1 APPLICATION(S): at 05:59

## 2019-03-13 RX ADMIN — PIPERACILLIN AND TAZOBACTAM 25 GRAM(S): 4; .5 INJECTION, POWDER, LYOPHILIZED, FOR SOLUTION INTRAVENOUS at 05:58

## 2019-03-13 RX ADMIN — Medication 100 MICROGRAM(S): at 05:57

## 2019-03-13 RX ADMIN — PIPERACILLIN AND TAZOBACTAM 25 GRAM(S): 4; .5 INJECTION, POWDER, LYOPHILIZED, FOR SOLUTION INTRAVENOUS at 22:02

## 2019-03-13 RX ADMIN — Medication 1: at 17:50

## 2019-03-13 RX ADMIN — Medication 1 DROP(S): at 14:30

## 2019-03-13 RX ADMIN — TAMSULOSIN HYDROCHLORIDE 0.4 MILLIGRAM(S): 0.4 CAPSULE ORAL at 22:01

## 2019-03-13 RX ADMIN — CARBIDOPA AND LEVODOPA 1 TABLET(S): 25; 100 TABLET ORAL at 05:58

## 2019-03-13 RX ADMIN — CARBIDOPA AND LEVODOPA 1 TABLET(S): 25; 100 TABLET ORAL at 12:14

## 2019-03-13 RX ADMIN — ENOXAPARIN SODIUM 40 MILLIGRAM(S): 100 INJECTION SUBCUTANEOUS at 14:27

## 2019-03-13 RX ADMIN — CARBIDOPA AND LEVODOPA 1 TABLET(S): 25; 100 TABLET ORAL at 17:51

## 2019-03-13 RX ADMIN — PIPERACILLIN AND TAZOBACTAM 25 GRAM(S): 4; .5 INJECTION, POWDER, LYOPHILIZED, FOR SOLUTION INTRAVENOUS at 14:27

## 2019-03-13 RX ADMIN — Medication 1000 UNIT(S): at 12:14

## 2019-03-13 NOTE — PROGRESS NOTE ADULT - ASSESSMENT
· Assessment	  · Assessment	  Patient admitted for fatigue, increased lethargy, suspected sepsis given hypothermia and clinical condition, s/p Iv fluids and Zosyn in ED, and treated for suspected hypothyroid state, patient also noted to have colonic mass with multiple masses in liver, suspected for liver metastasis. Blood culture grew gm -ve bacteria, pending final result.     Problem/Plan - 1:  ·  Problem: Bacteremia.  Plan: - p/w hypothermia and hypotension, s/p code sepsis  - leukocytosis trending up, afebrile overnight  - blood culture shows bacteroides fragilis likely due to colonic mass  - on zosyn D5  - UA and CXR, CT head negative  - repeat blood culture from 03/09 growing gram neg rods.  repeat blood cultures sent yesterday  - ID Dr. Artis.      Problem/Plan - 2:  ·  Problem: Colonic mass.  Plan: - has palpable mass in Rt. lower abdomen, mildly tender, had bloody bowel movement before 3 nights, now no bowel complaints  - CT abdomen showed Multiple liver lesions consistent with metastases and large mass in the lower abdomen/upper pelvis surrounding the sigmoid   colon which may be secondary to just tumor, lymphoma or adenocarcinoma.   -  CT chest shows no mets with Bilateral pleural effusions and subsegmental atelectasis of basilar right lower lobe.  - occult negative  - normal CEA, CA 19-9,  - GI Dr. Murdock consulted- advised sigmoidoscopy for diagnosis and biopsy on monday if family agrees, NPO after MN, fleet enema in AM  - Hem-onc Dr. Camargo consulted  - Palliative consulted- PT. DNR/DNI  Pt to have Sigmoidoscopy today.     Problem/Plan - 3:  ·  Problem: Hypothyroidism.  Plan: p/w hypothermia of  temp of 94.7 rectally and hypotension, likely 2/2 hypothyroid state precipitated by missed dose and malignancy vs sepsis  s/p Hydrocortisone 50 mg IV x 1 dose then IV synthroid 50 microgm,  c/w synthroid 100mcg daily PO, (home dose)  pt. missed dose for 4 days  TSH within normal limits  normal free T4.      Problem/Plan - 4:  ·  Problem: Anemia.  Plan: occult negative  iron profile shows anemia of chronic disease with elevated ferritin likely due to malignancy  transfuse if Hb <7.      Problem/Plan - 5:  ·  Problem: Parkinson's disease.  Plan: continue with sinemet if patient able to tolerate PO at home dose  sinemet 10/100 TID.      Problem/Plan - 6:  Problem: Hypertension. Plan: hold off antihypertensives for now   BP well controlled off meds  continue to monitor BP.     Problem/Plan - 7:  ·  Problem: CHF (congestive heart failure).  Plan: not in exacerbation  patient on atenolol at home, will hold for now in setting of hypotension  normal echo with GII DD.      Problem/Plan - 8:  ·  Problem: Sacral decubitus ulcer, stage II.  Plan: does not look infected, cover daily, clean with NS  wound care consulted.      Problem/Plan - 9:  ·  Problem: Diabetes.  Plan: - no previous history of diabetes  - A1C 6.7  - will start on hss  - diabetic education.      Problem/Plan - 10:  Problem: Need for prophylactic measure. Plan; IMPROVE VTE Individual Risk Assessment          RISK                                                          Points  [  ] Previous VTE                                                3  [  ] Thrombophilia                                             2  [  ] Lower limb paralysis                                   2        (unable to hold up >15 seconds)    [  ] Current Cancer                                             2         (within 6 months)  [  x] Immobilization > 24 hrs                              1  [  ] ICU/CCU stay > 24 hours                             1  [ x ] Age > 60                                                         1    IMPROVE VTE Score: 2    lovenox for vte prophylaxis. · Assessment	  · Assessment	  Patient admitted for fatigue, increased lethargy, suspected sepsis given hypothermia and clinical condition, s/p Iv fluids and Zosyn in ED, and treated for suspected hypothyroid state, patient also noted to have colonic mass with multiple masses in liver, suspected for liver metastasis. Blood culture grew gm -ve bacteria, pending final result.     Problem/Plan - 1:  ·  Problem: Bacteremia.  Plan: - p/w hypothermia and hypotension, s/p code sepsis  - leukocytosis trending up, afebrile overnight  - blood culture shows bacteroides fragilis likely due to colonic mass  - on zosyn D7  - UA and CXR, CT head negative  - repeat blood culture are negative.  - ID Dr. Artis.      Problem/Plan - 2:  ·  Problem: Colonic mass.  Plan: - has palpable mass in Rt. lower abdomen, mildly tender, had bloody bowel movement before 3 nights, now no bowel complaints  - CT abdomen showed Multiple liver lesions consistent with metastases and large mass in the lower abdomen/upper pelvis surrounding the sigmoid   colon which may be secondary to just tumor, lymphoma or adenocarcinoma.   -  CT chest shows no mets with Bilateral pleural effusions and subsegmental atelectasis of basilar right lower lobe.  - occult negative  - normal CEA, CA 19-9,  - GI Dr. Murdock consulted- advised sigmoidoscopy for diagnosis and biopsy on monday if family agrees, NPO after MN, fleet enema in AM  - Hem-onc Dr. Camargo consulted  - Palliative consulted- PT. DNR/DNI  Pt to have Sigmoidoscopy today.     Problem/Plan - 3:  ·  Problem: Hypothyroidism.  Plan: p/w hypothermia of  temp of 94.7 rectally and hypotension, likely 2/2 hypothyroid state precipitated by missed dose and malignancy vs sepsis  s/p Hydrocortisone 50 mg IV x 1 dose then IV synthroid 50 microgm,  c/w synthroid 100mcg daily PO, (home dose)  pt. missed dose for 4 days  TSH within normal limits  normal free T4.      Problem/Plan - 4:  ·  Problem: Anemia.  Plan: occult negative  iron profile shows anemia of chronic disease with elevated ferritin likely due to malignancy  transfuse if Hb <7.      Problem/Plan - 5:  ·  Problem: Parkinson's disease.  Plan: continue with sinemet if patient able to tolerate PO at home dose  sinemet 10/100 TID.      Problem/Plan - 6:  Problem: Hypertension. Plan: hold off antihypertensives for now   BP well controlled off meds  continue to monitor BP.     Problem/Plan - 7:  ·  Problem: CHF (congestive heart failure).  Plan: not in exacerbation  patient on atenolol at home, will hold for now in setting of hypotension  normal echo with GII DD.      Problem/Plan - 8:  ·  Problem: Sacral decubitus ulcer, stage II.  Plan: does not look infected, cover daily, clean with NS  wound care consulted.      Problem/Plan - 9:  ·  Problem: Diabetes.  Plan: - no previous history of diabetes  - A1C 6.7  - will start on hss  - diabetic education.      Problem/Plan - 10:  Problem: Need for prophylactic measure. Plan; IMPROVE VTE Individual Risk Assessment          RISK                                                          Points  [  ] Previous VTE                                                3  [  ] Thrombophilia                                             2  [  ] Lower limb paralysis                                   2        (unable to hold up >15 seconds)    [  ] Current Cancer                                             2         (within 6 months)  [  x] Immobilization > 24 hrs                              1  [  ] ICU/CCU stay > 24 hours                             1  [ x ] Age > 60                                                         1    IMPROVE VTE Score: 2    lovenox for vte prophylaxis.

## 2019-03-13 NOTE — PROGRESS NOTE ADULT - SUBJECTIVE AND OBJECTIVE BOX
PGY1 Note discussed with supervising resident and primary attending.    Patient is a 83y old  Male who presents with a chief complaint of weakness, lethargy (13 Mar 2019 08:10)      INTERVAL HPI/OVERNIGHT EVENTS:    MEDICATIONS  (STANDING):  artificial  tears Solution 1 Drop(s) Left EYE three times a day  carbidopa/levodopa  10/100 1 Tablet(s) Oral <User Schedule>  cholecalciferol 1000 Unit(s) Oral daily  enoxaparin Injectable 40 milliGRAM(s) SubCutaneous daily  erythromycin   Ointment 1 Application(s) Right EYE two times a day  insulin lispro (HumaLOG) corrective regimen sliding scale   SubCutaneous three times a day before meals  levothyroxine 100 MICROGram(s) Oral daily  piperacillin/tazobactam IVPB. 3.375 Gram(s) IV Intermittent every 8 hours  tamsulosin 0.4 milliGRAM(s) Oral at bedtime    MEDICATIONS  (PRN):  acetaminophen   Tablet .. 650 milliGRAM(s) Oral every 6 hours PRN Temp greater or equal to 38C (100.4F)      Allergies    No Known Allergies    Intolerances        REVIEW OF SYSTEMS:  CONSTITUTIONAL: No fever, weight loss, or fatigue  RESPIRATORY: No cough, wheezing, chills or hemoptysis; No shortness of breath  CARDIOVASCULAR: No chest pain, palpitations, dizziness, or leg swelling  GASTROINTESTINAL: No abdominal or epigastric pain. No nausea, vomiting, or hematemesis; No diarrhea or constipation. No melena or hematochezia.  NEUROLOGICAL: No headaches, memory loss, loss of strength, numbness, or tremors  SKIN: No itching, burning, rashes, or lesions     Vital Signs Last 24 Hrs  T(C): 36.4 (13 Mar 2019 08:11), Max: 36.7 (13 Mar 2019 00:01)  T(F): 97.6 (13 Mar 2019 08:11), Max: 98.1 (13 Mar 2019 00:01)  HR: 76 (13 Mar 2019 08:11) (76 - 84)  BP: 120/77 (13 Mar 2019 08:11) (113/66 - 121/60)  BP(mean): --  RR: 16 (13 Mar 2019 08:11) (16 - 16)  SpO2: 99% (13 Mar 2019 08:11) (97% - 99%)    PHYSICAL EXAM:  GENERAL: NAD, well-groomed, well-developed  HEAD:  Atraumatic, Normocephalic  EYES: EOMI, PERRLA, conjunctiva and sclera clear  NECK: Supple, No JVD, Normal thyroid  CHEST/LUNG: Clear to percussion bilaterally; No rales, rhonchi, wheezing, or rubs  HEART: Regular rate and rhythm; No murmurs, rubs, or gallops  ABDOMEN: Soft, Nontender, Nondistended; Bowel sounds present  NERVOUS SYSTEM:  Alert & Oriented X3, Good concentration; Motor Strength 5/5 B/L   EXTREMITIES:  2+ Peripheral Pulses, No clubbing, cyanosis, or edema  SKIN;    LABS:                        9.4    13.81 )-----------( 317      ( 13 Mar 2019 06:43 )             30.0     03-13    132<L>  |  98  |  15  ----------------------------<  116<H>  3.8   |  25  |  0.57    Ca    8.1<L>      13 Mar 2019 06:43          CAPILLARY BLOOD GLUCOSE      POCT Blood Glucose.: 139 mg/dL (13 Mar 2019 12:10)  POCT Blood Glucose.: 124 mg/dL (13 Mar 2019 08:30)  POCT Blood Glucose.: 136 mg/dL (12 Mar 2019 21:12)  POCT Blood Glucose.: 113 mg/dL (12 Mar 2019 17:12)      RADIOLOGY & ADDITIONAL TESTS:    Imaging Personally Reviewed:  [ ] YES  [ ] NO    Consultant(s) Notes Reviewed:  [ ] YES  [ ] NO PGY1 Note discussed with supervising resident and primary attending.    Patient is a 83y old  Male who presents with a chief complaint of weakness, lethargy (13 Mar 2019 08:10)      INTERVAL HPI/OVERNIGHT EVENTS:   pt seen and examined at bedside.  Repeat blood culture is negative.  Surgical oncology recommended no surgical intervention.  Pt serum sodium is 132. we are sending urine studies. will f.u BMP tomorrow.      MEDICATIONS  (STANDING):  artificial  tears Solution 1 Drop(s) Left EYE three times a day  carbidopa/levodopa  10/100 1 Tablet(s) Oral <User Schedule>  cholecalciferol 1000 Unit(s) Oral daily  enoxaparin Injectable 40 milliGRAM(s) SubCutaneous daily  erythromycin   Ointment 1 Application(s) Right EYE two times a day  insulin lispro (HumaLOG) corrective regimen sliding scale   SubCutaneous three times a day before meals  levothyroxine 100 MICROGram(s) Oral daily  piperacillin/tazobactam IVPB. 3.375 Gram(s) IV Intermittent every 8 hours  tamsulosin 0.4 milliGRAM(s) Oral at bedtime    MEDICATIONS  (PRN):  acetaminophen   Tablet .. 650 milliGRAM(s) Oral every 6 hours PRN Temp greater or equal to 38C (100.4F)      Allergies    No Known Allergies    Intolerances        REVIEW OF SYSTEMS:  CONSTITUTIONAL: No fever, weight loss, or fatigue  RESPIRATORY: No cough, wheezing, chills or hemoptysis; No shortness of breath  CARDIOVASCULAR: No chest pain, palpitations, dizziness, or leg swelling  GASTROINTESTINAL: No abdominal or epigastric pain. No nausea, vomiting, or hematemesis; No diarrhea or constipation. No melena or hematochezia.  NEUROLOGICAL: No headaches, memory loss, loss of strength, numbness, or tremors  SKIN: No itching, burning, rashes, or lesions     Vital Signs Last 24 Hrs  T(C): 36.4 (13 Mar 2019 08:11), Max: 36.7 (13 Mar 2019 00:01)  T(F): 97.6 (13 Mar 2019 08:11), Max: 98.1 (13 Mar 2019 00:01)  HR: 76 (13 Mar 2019 08:11) (76 - 84)  BP: 120/77 (13 Mar 2019 08:11) (113/66 - 121/60)  BP(mean): --  RR: 16 (13 Mar 2019 08:11) (16 - 16)  SpO2: 99% (13 Mar 2019 08:11) (97% - 99%)    PHYSICAL EXAM:  GENERAL: NAD, well-groomed, well-developed  HEAD:  Atraumatic, Normocephalic  EYES: EOMI, PERRLA, conjunctiva and sclera clear  NECK: Supple, No JVD, Normal thyroid  CHEST/LUNG: Clear to percussion bilaterally; No rales, rhonchi, wheezing, or rubs  HEART: Regular rate and rhythm; No murmurs, rubs, or gallops  ABDOMEN: Soft, Nontender, Nondistended; Bowel sounds present  NERVOUS SYSTEM:  Alert & Oriented X3, Good concentration; Motor Strength 5/5 B/L   EXTREMITIES:  2+ Peripheral Pulses, No clubbing, cyanosis, or edema  SKIN;    LABS:                        9.4    13.81 )-----------( 317      ( 13 Mar 2019 06:43 )             30.0     03-13    132<L>  |  98  |  15  ----------------------------<  116<H>  3.8   |  25  |  0.57    Ca    8.1<L>      13 Mar 2019 06:43          CAPILLARY BLOOD GLUCOSE      POCT Blood Glucose.: 139 mg/dL (13 Mar 2019 12:10)  POCT Blood Glucose.: 124 mg/dL (13 Mar 2019 08:30)  POCT Blood Glucose.: 136 mg/dL (12 Mar 2019 21:12)  POCT Blood Glucose.: 113 mg/dL (12 Mar 2019 17:12)        Consultant(s) Notes Reviewed:  [x] YES  [ ] NO

## 2019-03-13 NOTE — PROGRESS NOTE ADULT - ATTENDING COMMENTS
Patient seen/evaluated at bedside 3/13/2019. I agree with the resident progress note/outlined plan of care. My independent findings and conclusions are documented.    Pt w/out specific complaints. Denies pain, nausea/vomiting.  Updates provided each to marek Nelson and Terell    1. Sepsis present on admission (with hypothermia, hypotension)  2. Bacteroides Fragilis Bacteremia  3. acute infectious encephalopathy  4. 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 hyponatremia  6. debility/ deconditioning  7. hypothyroidism  7. DM T II  8. Parkinson's Disease    zosyn, ID consult appreciated  -repeat blood cultures negative thus far, will f/u results  CEA not elevated. s/p liver biopsy revealing spindle cell neoplasm.   appreciate discussion with hematology-oncology and palliative care  input and management from GI and general surgery noted,  f/u sigmoidoscopy biopsy results  surgical oncology consulted  c/w home dose of synthroid and sinemet  send urine sodium serum uric acid, urine osm, serum osm. Dextrose based fluid was discontinued yesterday  dysphagia diet  dvt ppx, fall precautions with bed alarm  rest of plan as above .

## 2019-03-13 NOTE — PROGRESS NOTE ADULT - SUBJECTIVE AND OBJECTIVE BOX
Patient seen and examined at bedside, resting in bed, in NAD. Denies N/V. Hemodynamically stable and afebrile. States he had BM yesterday, continues to pass flatus. Went for sigmoidoscopy and biopsy of large sigmoid lesion yesterday. Liver biopsy showing spindle cell neoplasm. Latest blood Cx no growth to date.     Vital Signs Last 24 Hrs  T(C): 36.7 (13 Mar 2019 00:01), Max: 36.7 (12 Mar 2019 08:21)  T(F): 98.1 (13 Mar 2019 00:01), Max: 98.1 (13 Mar 2019 00:01)  HR: 84 (13 Mar 2019 00:01) (68 - 84)  BP: 121/60 (13 Mar 2019 00:01) (110/58 - 121/60)  BP(mean): --  RR: 16 (13 Mar 2019 00:01) (16 - 16)  SpO2: 99% (13 Mar 2019 00:01) (97% - 99%)                          9.4    13.81 )-----------( 317      ( 13 Mar 2019 06:43 )             30.0   03-13    132<L>  |  98  |  15  ----------------------------<  116<H>  3.8   |  25  |  0.57    Ca    8.1<L>      13 Mar 2019 06:43    General: AAOx3, very soft spoken, NAD  Resp: Reg resp effort on NC  Abd: Soft, palpable mass in lower midline, non distended, non tender    Assessment:  84 y/o Male w/ Sigmoid colon mass, no obstruction, Multiple hepatic masses/ metastatic disease    Plan:  -F/u sigmoid biopsy path results  -Continue ABx  -Continue diet  -DVT PPx  -Pain control as needed  -Monitor vitals Patient seen and examined at bedside, resting in bed, in NAD. Denies N/V. Hemodynamically stable and afebrile. States he had BM yesterday, continues to pass flatus. Went for sigmoidoscopy and biopsy of large sigmoid lesion yesterday. Liver biopsy showing spindle cell neoplasm. Latest blood Cx no growth to date.     Vital Signs Last 24 Hrs  T(C): 36.7 (13 Mar 2019 00:01), Max: 36.7 (12 Mar 2019 08:21)  T(F): 98.1 (13 Mar 2019 00:01), Max: 98.1 (13 Mar 2019 00:01)  HR: 84 (13 Mar 2019 00:01) (68 - 84)  BP: 121/60 (13 Mar 2019 00:01) (110/58 - 121/60)  BP(mean): --  RR: 16 (13 Mar 2019 00:01) (16 - 16)  SpO2: 99% (13 Mar 2019 00:01) (97% - 99%)                          9.4    13.81 )-----------( 317      ( 13 Mar 2019 06:43 )             30.0   03-13    132<L>  |  98  |  15  ----------------------------<  116<H>  3.8   |  25  |  0.57    Ca    8.1<L>      13 Mar 2019 06:43    General: AAOx3, very soft spoken, NAD  Resp: Reg resp effort on NC  Abd: Soft, palpable mass in lower midline, non distended, non tender    Assessment:  82 y/o Male w/ Sigmoid colon mass, no obstruction, Multiple hepatic masses/ metastatic disease    Plan:  -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

## 2019-03-14 LAB
ANION GAP SERPL CALC-SCNC: 6 MMOL/L — SIGNIFICANT CHANGE UP (ref 5–17)
ANION GAP SERPL CALC-SCNC: 7 MMOL/L — SIGNIFICANT CHANGE UP (ref 5–17)
BUN SERPL-MCNC: 14 MG/DL — SIGNIFICANT CHANGE UP (ref 7–18)
BUN SERPL-MCNC: 14 MG/DL — SIGNIFICANT CHANGE UP (ref 7–18)
CALCIUM SERPL-MCNC: 7.9 MG/DL — LOW (ref 8.4–10.5)
CALCIUM SERPL-MCNC: 8.4 MG/DL — SIGNIFICANT CHANGE UP (ref 8.4–10.5)
CHLORIDE SERPL-SCNC: 101 MMOL/L — SIGNIFICANT CHANGE UP (ref 96–108)
CHLORIDE SERPL-SCNC: 98 MMOL/L — SIGNIFICANT CHANGE UP (ref 96–108)
CO2 SERPL-SCNC: 26 MMOL/L — SIGNIFICANT CHANGE UP (ref 22–31)
CO2 SERPL-SCNC: 27 MMOL/L — SIGNIFICANT CHANGE UP (ref 22–31)
CREAT SERPL-MCNC: 0.56 MG/DL — SIGNIFICANT CHANGE UP (ref 0.5–1.3)
CREAT SERPL-MCNC: 0.64 MG/DL — SIGNIFICANT CHANGE UP (ref 0.5–1.3)
GLUCOSE SERPL-MCNC: 112 MG/DL — HIGH (ref 70–99)
GLUCOSE SERPL-MCNC: 149 MG/DL — HIGH (ref 70–99)
HCT VFR BLD CALC: 31.4 % — LOW (ref 39–50)
HGB BLD-MCNC: 9.6 G/DL — LOW (ref 13–17)
MCHC RBC-ENTMCNC: 24.2 PG — LOW (ref 27–34)
MCHC RBC-ENTMCNC: 30.6 GM/DL — LOW (ref 32–36)
MCV RBC AUTO: 79.1 FL — LOW (ref 80–100)
NRBC # BLD: 0 /100 WBCS — SIGNIFICANT CHANGE UP (ref 0–0)
OSMOLALITY SERPL: 193 MOS/KG — LOW (ref 275–300)
PLATELET # BLD AUTO: 367 K/UL — SIGNIFICANT CHANGE UP (ref 150–400)
POTASSIUM SERPL-MCNC: 3.7 MMOL/L — SIGNIFICANT CHANGE UP (ref 3.5–5.3)
POTASSIUM SERPL-MCNC: 3.9 MMOL/L — SIGNIFICANT CHANGE UP (ref 3.5–5.3)
POTASSIUM SERPL-SCNC: 3.7 MMOL/L — SIGNIFICANT CHANGE UP (ref 3.5–5.3)
POTASSIUM SERPL-SCNC: 3.9 MMOL/L — SIGNIFICANT CHANGE UP (ref 3.5–5.3)
RBC # BLD: 3.97 M/UL — LOW (ref 4.2–5.8)
RBC # FLD: 15.6 % — HIGH (ref 10.3–14.5)
SODIUM SERPL-SCNC: 131 MMOL/L — LOW (ref 135–145)
SODIUM SERPL-SCNC: 134 MMOL/L — LOW (ref 135–145)
SODIUM UR-SCNC: 57 MMOL/L — SIGNIFICANT CHANGE UP (ref 40–220)
URATE UR-MCNC: 35.8 MG/DL — SIGNIFICANT CHANGE UP
WBC # BLD: 13.74 K/UL — HIGH (ref 3.8–10.5)
WBC # FLD AUTO: 13.74 K/UL — HIGH (ref 3.8–10.5)

## 2019-03-14 PROCEDURE — 99233 SBSQ HOSP IP/OBS HIGH 50: CPT | Mod: GC

## 2019-03-14 RX ORDER — SODIUM CHLORIDE 9 MG/ML
1000 INJECTION INTRAMUSCULAR; INTRAVENOUS; SUBCUTANEOUS
Qty: 0 | Refills: 0 | Status: DISCONTINUED | OUTPATIENT
Start: 2019-03-14 | End: 2019-03-19

## 2019-03-14 RX ADMIN — Medication 1 DROP(S): at 21:46

## 2019-03-14 RX ADMIN — Medication 1 APPLICATION(S): at 17:12

## 2019-03-14 RX ADMIN — Medication 1 DROP(S): at 13:25

## 2019-03-14 RX ADMIN — CARBIDOPA AND LEVODOPA 1 TABLET(S): 25; 100 TABLET ORAL at 11:26

## 2019-03-14 RX ADMIN — Medication 1: at 17:12

## 2019-03-14 RX ADMIN — Medication 100 MICROGRAM(S): at 06:02

## 2019-03-14 RX ADMIN — TAMSULOSIN HYDROCHLORIDE 0.4 MILLIGRAM(S): 0.4 CAPSULE ORAL at 21:46

## 2019-03-14 RX ADMIN — Medication 1 APPLICATION(S): at 06:03

## 2019-03-14 RX ADMIN — Medication 1000 UNIT(S): at 11:26

## 2019-03-14 RX ADMIN — CARBIDOPA AND LEVODOPA 1 TABLET(S): 25; 100 TABLET ORAL at 17:12

## 2019-03-14 RX ADMIN — PIPERACILLIN AND TAZOBACTAM 25 GRAM(S): 4; .5 INJECTION, POWDER, LYOPHILIZED, FOR SOLUTION INTRAVENOUS at 13:25

## 2019-03-14 RX ADMIN — ENOXAPARIN SODIUM 40 MILLIGRAM(S): 100 INJECTION SUBCUTANEOUS at 11:26

## 2019-03-14 RX ADMIN — PIPERACILLIN AND TAZOBACTAM 25 GRAM(S): 4; .5 INJECTION, POWDER, LYOPHILIZED, FOR SOLUTION INTRAVENOUS at 06:03

## 2019-03-14 RX ADMIN — Medication 1: at 08:58

## 2019-03-14 RX ADMIN — SODIUM CHLORIDE 75 MILLILITER(S): 9 INJECTION INTRAMUSCULAR; INTRAVENOUS; SUBCUTANEOUS at 11:25

## 2019-03-14 RX ADMIN — Medication 1 DROP(S): at 06:02

## 2019-03-14 RX ADMIN — PIPERACILLIN AND TAZOBACTAM 25 GRAM(S): 4; .5 INJECTION, POWDER, LYOPHILIZED, FOR SOLUTION INTRAVENOUS at 21:46

## 2019-03-14 RX ADMIN — CARBIDOPA AND LEVODOPA 1 TABLET(S): 25; 100 TABLET ORAL at 06:02

## 2019-03-14 NOTE — CHART NOTE - NSCHARTNOTEFT_GEN_A_CORE
Palliative Care:    Met with patient's wife and his son Terell at the bedside.  Oncologist Dr. Camargo was conferenced in via phone to update the family.  Per Dr. Camargo the final pathology is still pending, but will confirm in 2 weeks.    Family understands given how debilitated the patient is he would require rehab before starting chemotherapy.    Terell Galloway would like to wait for the final report, because the family is considering hospice.

## 2019-03-14 NOTE — PROGRESS NOTE ADULT - SUBJECTIVE AND OBJECTIVE BOX
Pt seen and examined at bedside appears in NAD. Pt reports tolerating diet and having bowel function. Denies fever, chills, nausea, vomiting. Sigmoidoscopy performed yesterday pending results of biopsy. 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     PE:  VSS  AOx3 NAD  Abd: mild distention, palpable RLQ mass, nttp, no guarding, non peritoneal     labs and imagin.6    13.74 )-----------( 367      ( 14 Mar 2019 05:41 )             31.4   Vital Signs Last 24 Hrs  T(C): 36.6 (14 Mar 2019 07:57), Max: 36.7 (13 Mar 2019 16:00)  T(F): 97.9 (14 Mar 2019 07:57), Max: 98.1 (13 Mar 2019 16:00)  HR: 64 (14 Mar 2019 07:57) (64 - 72)  BP: 115/58 (14 Mar 2019 07:57) (113/75 - 126/67)  BP(mean): --  RR: 18 (14 Mar 2019 07:57) (17 - 18)  SpO2: 97% (14 Mar 2019 07:57) (97% - 98%)    A/P: 83 M with large nonobstructing sigmoid mass with diffuse liver mets, 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

## 2019-03-14 NOTE — PROGRESS NOTE ADULT - SUBJECTIVE AND OBJECTIVE BOX
83y Male    Meds:  piperacillin/tazobactam IVPB. 3.375 Gram(s) IV Intermittent every 8 hours    Allergies    No Known Allergies    Intolerances        VITALS:  Vital Signs Last 24 Hrs  T(C): 36.8 (14 Mar 2019 15:39), Max: 36.8 (14 Mar 2019 15:39)  T(F): 98.2 (14 Mar 2019 15:39), Max: 98.2 (14 Mar 2019 15:39)  HR: 72 (14 Mar 2019 15:39) (64 - 72)  BP: 113/61 (14 Mar 2019 15:39) (113/61 - 126/67)  BP(mean): --  RR: 18 (14 Mar 2019 15:39) (18 - 18)  SpO2: 100% (14 Mar 2019 15:39) (97% - 100%)    LABS/DIAGNOSTIC TESTS:                          9.6    13.74 )-----------( 367      ( 14 Mar 2019 05:41 )             31.4         03-14    134<L>  |  101  |  14  ----------------------------<  149<H>  3.9   |  26  |  0.56    Ca    7.9<L>      14 Mar 2019 14:18            CULTURES: .Blood  03-12 @ 01:51   No growth to date.  --  --      .Blood  03-09 @ 09:56   Growth in anaerobic bottle: Bacteroides fragilis "Susceptibilities not  performed"  --    Growth in anaerobic bottle: Gram Negative Rods      .Urine  03-07 @ 00:49   No growth  --  --      .Blood  03-07 @ 00:36   Growth in anaerobic bottle: Bacteroides fragilis "Susceptibilities not  performed"              RADIOLOGY:      ROS:  [  ] UNABLE TO ELICIT 83y Male who is looking better clinically, results of his biopsies noted, his repeat blood cultures are negative, His Bacteroides is a GI bug and is coming from his colon likely secondary to his tumor invading the mucosa of bowel. He has no fevers or chills , his family is at the bedside. He is trying to eat some food.    Meds:  piperacillin/tazobactam IVPB. 3.375 Gram(s) IV Intermittent every 8 hours    Allergies    No Known Allergies    Intolerances        VITALS:  Vital Signs Last 24 Hrs  T(C): 36.8 (14 Mar 2019 15:39), Max: 36.8 (14 Mar 2019 15:39)  T(F): 98.2 (14 Mar 2019 15:39), Max: 98.2 (14 Mar 2019 15:39)  HR: 72 (14 Mar 2019 15:39) (64 - 72)  BP: 113/61 (14 Mar 2019 15:39) (113/61 - 126/67)  BP(mean): --  RR: 18 (14 Mar 2019 15:39) (18 - 18)  SpO2: 100% (14 Mar 2019 15:39) (97% - 100%)    LABS/DIAGNOSTIC TESTS:                          9.6    13.74 )-----------( 367      ( 14 Mar 2019 05:41 )             31.4         03-14    134<L>  |  101  |  14  ----------------------------<  149<H>  3.9   |  26  |  0.56    Ca    7.9<L>      14 Mar 2019 14:18            CULTURES: .Blood  03-12 @ 01:51   No growth to date.  --  --      .Blood  03-09 @ 09:56   Growth in anaerobic bottle: Bacteroides fragilis "Susceptibilities not  performed"  --    Growth in anaerobic bottle: Gram Negative Rods      .Urine  03-07 @ 00:49   No growth  --  --      .Blood  03-07 @ 00:36   Growth in anaerobic bottle: Bacteroides fragilis "Susceptibilities not  performed"              RADIOLOGY:      ROS:  [  ] UNABLE TO ELICIT

## 2019-03-14 NOTE — PROGRESS NOTE ADULT - ASSESSMENT
· Assessment	  Patient admitted for fatigue, increased lethargy, suspected sepsis given hypothermia and clinical condition, s/p Iv fluids and Zosyn in ED, and treated for suspected hypothyroid state, patient also noted to have colonic mass with multiple masses in liver, suspected for liver metastasis. Blood culture grew gm -ve bacteria, pending final result.     Problem/Plan - 1:  ·  Problem: Bacteremia.  Plan: -  resolved.   p/w hypothermia and hypotension, s/p code sepsis  - leukocytosis trending up, afebrile overnight  - blood culture shows bacteroides fragilis likely due to colonic mass  - on zosyn D8  - UA and CXR, CT head negative  - repeat blood culture are negative.  - ID Dr. Artis.      Problem/Plan - 2:  ·  Problem: Colonic mass.  Plan: - has palpable mass in Rt. lower abdomen, mildly tender, had bloody bowel movement before 3 nights, now no bowel complaints  - CT abdomen showed Multiple liver lesions consistent with metastases and large mass in the lower abdomen/upper pelvis surrounding the sigmoid   colon which may be secondary to just tumor, lymphoma or adenocarcinoma.   -  CT chest shows no mets with Bilateral pleural effusions and subsegmental atelectasis of basilar right lower lobe.  - occult negative  - normal CEA, CA 19-9,  - GI Dr. Murdock consulted- advised sigmoidoscopy for diagnosis and biopsy on monday if family agrees, NPO after MN, fleet enema in AM  - Hem-onc Dr. Camargo consulted  - Palliative consulted- PT. DNR/DNI  Pt had sigmoidoscopy on 03/12/19.     Problem/Plan - 3:  ·  Problem: Hypothyroidism.  Plan: p/w hypothermia of  temp of 94.7 rectally and hypotension, likely 2/2 hypothyroid state precipitated by missed dose and malignancy vs sepsis  s/p Hydrocortisone 50 mg IV x 1 dose then IV synthroid 50 microgm,  c/w synthroid 100mcg daily PO, (home dose)  pt. missed dose for 4 days  TSH within normal limits  normal free T4.      Problem/Plan - 4:  ·  Problem: Anemia.  Plan: occult negative  iron profile shows anemia of chronic disease with elevated ferritin likely due to malignancy  transfuse if Hb <7.        Problem/Plan - 5:  ·  Problem: Parkinson's disease.  Plan: continue with sinemet if patient able to tolerate PO at home dose  sinemet 10/100 TID.      Problem/Plan - 6:  Problem: Hypertension. Plan: hold off antihypertensives for now   BP well controlled off meds  continue to monitor BP.     Problem/Plan - 7:  ·  Problem: CHF (congestive heart failure).  Plan: not in exacerbation  patient on atenolol at home, will hold for now in setting of hypotension  normal echo with GII DD.      Problem/Plan - 8:  ·  Problem: Sacral decubitus ulcer, stage II.  Plan: does not look infected, cover daily, clean with NS  wound care nurse on board      Problem/Plan - 9:  ·  Problem: Diabetes.  Plan: - no previous history of diabetes  - A1C 6.7  - on hss  - diabetic education.      Problem/Plan - 10:  Problem: Need for prophylactic measure. Plan; IMPROVE VTE Individual Risk Assessment          RISK                                                          Points  [  ] Previous VTE                                                3  [  ] Thrombophilia                                             2  [  ] Lower limb paralysis                                   2        (unable to hold up >15 seconds)    [  ] Current Cancer                                             2         (within 6 months)  [  x] Immobilization > 24 hrs                              1  [  ] ICU/CCU stay > 24 hours                             1  [ x ] Age > 60                                                         1    IMPROVE VTE Score: 2    lovenox for vte prophylaxis.

## 2019-03-14 NOTE — PROGRESS NOTE ADULT - ASSESSMENT
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  -awaiting final pathology but if GIST is confirmed, pt would benefit from palliative systemic therapy with oral TKI agent such as imatinib with good response and improvement of symptoms and overall survival  -goals of care discussion has been initiated with pt and his sons (pt's spouse has dementia and does not have capacity to be HCP)  -it is appropriate to obtain hospiece evaluation during this hospitalization given gravity of pt's condition    Problem #2 Anemia   -multifactorial including anemia of chronic disease as well as possible GI bleed  -transfuse to keep hg > 7 during the acute infection    Problem #3 Sepsis - pt is stable with abx tx of the baceteremia but still with poor ECOG PS

## 2019-03-14 NOTE — PROGRESS NOTE ADULT - SUBJECTIVE AND OBJECTIVE BOX
MEDICAL ONCOLOGY CONSULTION f/up    S: Events noted; Pt feels weak; pt reports poor appetite    Vital Signs Last 24 Hrs  T(C): 36.6 (14 Mar 2019 07:57), Max: 36.7 (13 Mar 2019 16:00)  T(F): 97.9 (14 Mar 2019 07:57), Max: 98.1 (13 Mar 2019 16:00)  HR: 64 (14 Mar 2019 07:57) (64 - 72)  BP: 115/58 (14 Mar 2019 07:57) (113/75 - 126/67)  RR: 18 (14 Mar 2019 07:57) (17 - 18)  SpO2: 97% (14 Mar 2019 07:57) (97% - 98%)    NAD; opens eyes to voice and answers simple questions and follows directions; A and O x 2  PERRL; EOMI  supple; MMM  Nl S1 S2 RR; no  M  Ab with decreased BS; no guarding  warm and dry; + edema    Labs                        9.6    13.74 )-----------( 367      ( 14 Mar 2019 05:41 )             31.4   03-14    131<L>  |  98  |  14  ----------------------------<  112<H>  3.7   |  27  |  0.64    Ca    8.4      14 Mar 2019 05:41    Surgical Pathology Report:   ACCESSION No:  [id number]    VENTURAPA TONE                           1        Surgical Final Report          Final Diagnosis    Liver biopsy:  Spindle cell neoplasm  - Classification pending immunohistochemical  analysis    Verified by: Annemarie Pineda M.D.  (Electronic Signature)  Reported on: 03/12/19 11:03 EDT, [address] [place] [id number]  _________________________________________________________________    Comment  Specimen Adequacy:  - Diagnostic material is present.  Dr. Annemarie Pineda (3-11-19)    Clinical History  83-year-old male with history of liver metastases,HTN,CHF,  Parkinson's, liver mass    Specimen(s) Submitted  Liver, biopsy    Gross Description  The specimen is received in formalin and the specimen container  is labeled: Liver biopsy.  It consists of three cylindrical,  soft, tan-pink fragments of tissue measuring from 1.0 cm to 1.1  cm in length and each with an average diameter of 0.1 cm.  Entirely submitted.  One cassette.    In addition to other data that may appear on the specimen  container, the label has been inspected to confirm the presence  of the patient's name and date of birth.  Florentino Cash 03/11/2019 15:44 (03.11.19 @ 11:00)

## 2019-03-14 NOTE — PROGRESS NOTE ADULT - ASSESSMENT
Bacteremia     plan - cont zosyn 3.375gms iv q8hrs(D#8 of abxs)  if contemplating discharge will switch to ceftin/flagyl po at that time  he needs a total of 14 days of abxs

## 2019-03-14 NOTE — PROGRESS NOTE ADULT - SUBJECTIVE AND OBJECTIVE BOX
PGY1 Note discussed with supervising resident and primary attending.    Patient is a 83y old  Male who presents with a chief complaint of weakness, lethargy (14 Mar 2019 08:58)      INTERVAL HPI/OVERNIGHT EVENTS:   pt seen and examined at bedside.  No overnight event.  Hypernatremia is resolving, we are giving IV hydration with normal saline. We will monitor serum sodium level in am,  Case management on board for discharge to  rehab vs home.  Hematology/oncology consult appreciated. Will wait for immunohistological screening to be back before starting palliative chemotherapy.    MEDICATIONS  (STANDING):  artificial  tears Solution 1 Drop(s) Left EYE three times a day  carbidopa/levodopa  10/100 1 Tablet(s) Oral <User Schedule>  cholecalciferol 1000 Unit(s) Oral daily  enoxaparin Injectable 40 milliGRAM(s) SubCutaneous daily  erythromycin   Ointment 1 Application(s) Right EYE two times a day  insulin lispro (HumaLOG) corrective regimen sliding scale   SubCutaneous three times a day before meals  levothyroxine 100 MICROGram(s) Oral daily  piperacillin/tazobactam IVPB. 3.375 Gram(s) IV Intermittent every 8 hours  sodium chloride 0.9%. 1000 milliLiter(s) (75 mL/Hr) IV Continuous <Continuous>  tamsulosin 0.4 milliGRAM(s) Oral at bedtime    MEDICATIONS  (PRN):  acetaminophen   Tablet .. 650 milliGRAM(s) Oral every 6 hours PRN Temp greater or equal to 38C (100.4F)      Allergies    No Known Allergies    Intolerances        REVIEW OF SYSTEMS:  CONSTITUTIONAL: No fever, weight loss, or fatigue  RESPIRATORY: No cough, wheezing, chills or hemoptysis; No shortness of breath  CARDIOVASCULAR: No chest pain, palpitations, dizziness, or leg swelling  GASTROINTESTINAL: No abdominal or epigastric pain. No nausea, vomiting, or hematemesis; No diarrhea or constipation. No melena or hematochezia.  NEUROLOGICAL: No headaches, memory loss, loss of strength, numbness, or tremors  SKIN: No itching, burning, rashes, or lesions     Vital Signs Last 24 Hrs  T(C): 36.8 (14 Mar 2019 15:39), Max: 36.8 (14 Mar 2019 15:39)  T(F): 98.2 (14 Mar 2019 15:39), Max: 98.2 (14 Mar 2019 15:39)  HR: 72 (14 Mar 2019 15:39) (64 - 72)  BP: 113/61 (14 Mar 2019 15:39) (113/61 - 126/67)  BP(mean): --  RR: 18 (14 Mar 2019 15:39) (18 - 18)  SpO2: 100% (14 Mar 2019 15:39) (97% - 100%)    PHYSICAL EXAM:  GENERAL: NAD,   HEAD:  Atraumatic, Normocephalic  EYES: EOMI, PERRLA, conjunctiva and sclera clear  NECK: Supple, No JVD, Normal thyroid  CHEST/LUNG: Clear to percussion bilaterally; No rales, rhonchi, wheezing, or rubs  HEART: Regular rate and rhythm; No murmurs, rubs, or gallops  ABDOMEN: Soft, Nontender, Nondistended; Bowel sounds present  NERVOUS SYSTEM:  Alert & Oriented X3,   EXTREMITIES:  2+ Peripheral Pulses, No clubbing, cyanosis, or edema  SKIN;    LABS:                        9.6    13.74 )-----------( 367      ( 14 Mar 2019 05:41 )             31.4     03-14    134<L>  |  101  |  14  ----------------------------<  149<H>  3.9   |  26  |  0.56    Ca    7.9<L>      14 Mar 2019 14:18          CAPILLARY BLOOD GLUCOSE      POCT Blood Glucose.: 127 mg/dL (14 Mar 2019 11:53)  POCT Blood Glucose.: 154 mg/dL (14 Mar 2019 08:24)  POCT Blood Glucose.: 169 mg/dL (13 Mar 2019 21:13)  POCT Blood Glucose.: 184 mg/dL (13 Mar 2019 16:57)      RADIOLOGY & ADDITIONAL TESTS:Surgical Pathology Report (03.12.19 @ 09:59)    Surgical Pathology Report:   ACCESSION No:  [id number]    TONE ACKERMAN                           1        Surgical Final Report          Final Diagnosis    Sigmoid mass; biopsy:  - Fibrinopurulent exudate (ulcer bed) and colonic mucosa showing  lamina propria containing mixedinflammatory cell infiltrate with  prominent neutrophilic cell population, muciphages and  hemosiderin deposits and overlying  epithelium with hyperplastic and regenerative changes. See  comment.    Verified by: Fouzia Obregon MD  (Electronic Signature)  Reported on: 03/14/19 16:04 EDT, [address] [place] [id number]  _________________________________________________________________    Comment  Possible etiologies include submucosal mass (not sampled),  idiopathic inflammatory bowel disease, certain infections and  medications.  Clinical correlation is  recommended.  The findings were discussed with Dr.S. Abreu.    Clinical History  Sigmoid mass bx    Specimen(s) Submitted  Sigmoid mass bx    Gross Description  The specimen is received in formalin and the specimen container  is labeled: Sigmoid mass bx.  It  consists of four fragments of  soft, tan-pink tissue ranging from 0.1 cm to 0.3 cm in maximum  dimension.  Entirely submitted.  One cassette.    In addition to other data that may appear on the specimen  container, the label has been inspected to confirm the presence  of the patient's name and date of birth.  Florentino Cash 03/13/2019 15:17        Imaging Personally Reviewed:  [x] YES  [ ] NO    Consultant(s) Notes Reviewed:  [x] YES  [ ] NO

## 2019-03-14 NOTE — CHART NOTE - NSCHARTNOTEFT_GEN_A_CORE
Reassessment:   83yMalePatient is a 83y old  Male who presents with a chief complaint of weakness, lethargy (14 Mar 2019 08:58)      Factors impacting intake: [ ] none [ ] nausea  [ ] vomiting [ ] diarrhea [ ] constipation  [ ]chewing problems [ ] swallowing issues  [ X] other:     Diet Presciption: Diet, Mechanical Soft:   Consistent Carbohydrate {Evening Snacks}  DASH/TLC {Sodium & Cholesterol Restricted}  Supplement Feeding Modality:  Oral  Glucerna Shake Cans or Servings Per Day:  1       Frequency:  Two Times a day (19 @ 14:40)    Intake: Visited pt. asleep, d/w PCA, pt. appetite fair, at times "picky' with his meals, 40-50% & tolerating, drink Glucerna Shake, 1/2 can (8oz) at mealtime, no reports of GI distress, had BM in the am, encourage po intake with feeding assistance, rec. Diet, Mechanical Soft, consistent Carbohydrate w/evening snacks}, DASH/TLC & Add Glucerna Shake 1can bid (440kcal, 20g protein)  as medically feasible. RD available.     Daily Weight in k.9 (13 Mar 2019 23:52)  Weight in k.5 (08 Mar 2019 13:02)    % Weight Change: wt. variation noted     Pertinent Medications: MEDICATIONS  (STANDING):  artificial  tears Solution 1 Drop(s) Left EYE three times a day  carbidopa/levodopa  10/100 1 Tablet(s) Oral <User Schedule>  cholecalciferol 1000 Unit(s) Oral daily  enoxaparin Injectable 40 milliGRAM(s) SubCutaneous daily  erythromycin   Ointment 1 Application(s) Right EYE two times a day  insulin lispro (HumaLOG) corrective regimen sliding scale   SubCutaneous three times a day before meals  levothyroxine 100 MICROGram(s) Oral daily  piperacillin/tazobactam IVPB. 3.375 Gram(s) IV Intermittent every 8 hours  sodium chloride 0.9%. 1000 milliLiter(s) (75 mL/Hr) IV Continuous <Continuous>  tamsulosin 0.4 milliGRAM(s) Oral at bedtime    MEDICATIONS  (PRN):  acetaminophen   Tablet .. 650 milliGRAM(s) Oral every 6 hours PRN Temp greater or equal to 38C (100.4F)    Pertinent Labs: 03-14 Na134 mmol/L<L> Glu 149 mg/dL<H> K+ 3.9 mmol/L Cr  0.56 mg/dL BUN 14 mg/dL  Phos 1.6 mg/dL<L>  [id number] 6.7 %<H> - Chol 76 mg/dL LDL 32 mg/dL HDL 25 mg/dL<L> Trig 93 mg/dL     CAPILLARY BLOOD GLUCOSE      POCT Blood Glucose.: 127 mg/dL (14 Mar 2019 11:53)  POCT Blood Glucose.: 154 mg/dL (14 Mar 2019 08:24)  POCT Blood Glucose.: 169 mg/dL (13 Mar 2019 21:13)  POCT Blood Glucose.: 184 mg/dL (13 Mar 2019 16:57)    Skin: pressure ulcers    Estimated Needs:   [X ] no change since previous assessment  [ ] recalculated:     Previous Nutrition Diagnosis:   [X ] Inadequate Energy Intake [ ]Inadequate Oral Intake [ ] Excessive Energy Intake   [ ] Underweight [ ] Increased Nutrient Needs [ ] Overweight/Obesity   [ ] Altered GI Function [ ] Unintended Weight Loss [ ] Food & Nutrition Related Knowledge Deficit [ ] Malnutrition     Nutrition Diagnosis is [X ] ongoing  [ ] resolved [ ] not applicable     New Nutrition Diagnosis: [ ] not applicable       Interventions: To meet nutrition needs   Recommend  [ ] Change Diet To:  [ ] Nutrition Supplement  [ ] Nutrition Support  [ ] Other:     Monitoring and Evaluation:   [ X] PO intake [ x ] Tolerance to diet prescription [ x ] weights [ x ] labs[ x ] follow up per protocol  [ ] other:

## 2019-03-14 NOTE — PROGRESS NOTE ADULT - ATTENDING COMMENTS
Patient seen/evaluated at bedside 3/14/2019. I agree with the resident progress note/outlined plan of care. My independent findings and conclusions are documented.    Pt denies pain, nausea/vomiting. Case discussed with heme onc and Pt's son Terell. Tentative plan for rehab on discharge.     1. Bacteroides Fragilis Bacteremia  2. 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 Sepsis present on admission (with hypothermia, hypotension)  4. acute infectious encephalopathy (improved)  5. hyponatremia (resolved)  6. debility/ deconditioning  7. hypothyroidism  7. DM T II  8. Parkinson's Disease    zosyn, ID consult appreciated  -repeat blood cultures negative thus far--> possible discharge on flagyl and ceftin  CEA not elevated. s/p liver biopsy revealing spindle cell neoplasm. Sigmoid bx with inflammatory tissue but does not appear to include the mass. Will discuss with GI service  appreciate discussion with hematology-oncology and palliative care  input and management from GI and general surgery noted,  surgical oncology consulted--> no acute intervention  c/w home dose of synthroid and sinemet  hyponatremia responded to NS based fluid  dysphagia diet  dvt ppx, fall precautions with bed alarm  rest of plan as above .

## 2019-03-15 ENCOUNTER — APPOINTMENT (OUTPATIENT)
Dept: NEUROLOGY | Facility: CLINIC | Age: 84
End: 2019-03-15

## 2019-03-15 LAB
ANION GAP SERPL CALC-SCNC: 4 MMOL/L — LOW (ref 5–17)
BUN SERPL-MCNC: 11 MG/DL — SIGNIFICANT CHANGE UP (ref 7–18)
CALCIUM SERPL-MCNC: 8.2 MG/DL — LOW (ref 8.4–10.5)
CHLORIDE SERPL-SCNC: 103 MMOL/L — SIGNIFICANT CHANGE UP (ref 96–108)
CO2 SERPL-SCNC: 29 MMOL/L — SIGNIFICANT CHANGE UP (ref 22–31)
CREAT SERPL-MCNC: 0.7 MG/DL — SIGNIFICANT CHANGE UP (ref 0.5–1.3)
GLUCOSE SERPL-MCNC: 105 MG/DL — HIGH (ref 70–99)
HCT VFR BLD CALC: 30.2 % — LOW (ref 39–50)
HGB BLD-MCNC: 9.3 G/DL — LOW (ref 13–17)
MCHC RBC-ENTMCNC: 24.3 PG — LOW (ref 27–34)
MCHC RBC-ENTMCNC: 30.8 GM/DL — LOW (ref 32–36)
MCV RBC AUTO: 79.1 FL — LOW (ref 80–100)
NRBC # BLD: 0 /100 WBCS — SIGNIFICANT CHANGE UP (ref 0–0)
PLATELET # BLD AUTO: 401 K/UL — HIGH (ref 150–400)
POTASSIUM SERPL-MCNC: 3.9 MMOL/L — SIGNIFICANT CHANGE UP (ref 3.5–5.3)
POTASSIUM SERPL-SCNC: 3.9 MMOL/L — SIGNIFICANT CHANGE UP (ref 3.5–5.3)
RBC # BLD: 3.82 M/UL — LOW (ref 4.2–5.8)
RBC # FLD: 15.9 % — HIGH (ref 10.3–14.5)
SODIUM SERPL-SCNC: 136 MMOL/L — SIGNIFICANT CHANGE UP (ref 135–145)
WBC # BLD: 11.55 K/UL — HIGH (ref 3.8–10.5)
WBC # FLD AUTO: 11.55 K/UL — HIGH (ref 3.8–10.5)

## 2019-03-15 PROCEDURE — 99233 SBSQ HOSP IP/OBS HIGH 50: CPT | Mod: GC

## 2019-03-15 PROCEDURE — 99232 SBSQ HOSP IP/OBS MODERATE 35: CPT

## 2019-03-15 RX ORDER — KETOROLAC TROMETHAMINE 30 MG/ML
15 SYRINGE (ML) INJECTION ONCE
Qty: 0 | Refills: 0 | Status: DISCONTINUED | OUTPATIENT
Start: 2019-03-15 | End: 2019-03-15

## 2019-03-15 RX ADMIN — ENOXAPARIN SODIUM 40 MILLIGRAM(S): 100 INJECTION SUBCUTANEOUS at 11:37

## 2019-03-15 RX ADMIN — Medication 1 APPLICATION(S): at 06:20

## 2019-03-15 RX ADMIN — CARBIDOPA AND LEVODOPA 1 TABLET(S): 25; 100 TABLET ORAL at 11:37

## 2019-03-15 RX ADMIN — CARBIDOPA AND LEVODOPA 1 TABLET(S): 25; 100 TABLET ORAL at 17:30

## 2019-03-15 RX ADMIN — Medication 1 DROP(S): at 22:30

## 2019-03-15 RX ADMIN — Medication 1 APPLICATION(S): at 17:30

## 2019-03-15 RX ADMIN — PIPERACILLIN AND TAZOBACTAM 25 GRAM(S): 4; .5 INJECTION, POWDER, LYOPHILIZED, FOR SOLUTION INTRAVENOUS at 13:30

## 2019-03-15 RX ADMIN — Medication 15 MILLIGRAM(S): at 00:22

## 2019-03-15 RX ADMIN — TAMSULOSIN HYDROCHLORIDE 0.4 MILLIGRAM(S): 0.4 CAPSULE ORAL at 22:30

## 2019-03-15 RX ADMIN — PIPERACILLIN AND TAZOBACTAM 25 GRAM(S): 4; .5 INJECTION, POWDER, LYOPHILIZED, FOR SOLUTION INTRAVENOUS at 22:30

## 2019-03-15 RX ADMIN — Medication 100 MICROGRAM(S): at 06:19

## 2019-03-15 RX ADMIN — Medication 1 DROP(S): at 06:19

## 2019-03-15 RX ADMIN — Medication 1: at 11:48

## 2019-03-15 RX ADMIN — Medication 1 DROP(S): at 13:30

## 2019-03-15 RX ADMIN — Medication 1000 UNIT(S): at 11:37

## 2019-03-15 RX ADMIN — CARBIDOPA AND LEVODOPA 1 TABLET(S): 25; 100 TABLET ORAL at 06:19

## 2019-03-15 RX ADMIN — PIPERACILLIN AND TAZOBACTAM 25 GRAM(S): 4; .5 INJECTION, POWDER, LYOPHILIZED, FOR SOLUTION INTRAVENOUS at 06:22

## 2019-03-15 RX ADMIN — Medication 15 MILLIGRAM(S): at 01:01

## 2019-03-15 NOTE — PROGRESS NOTE ADULT - ASSESSMENT
Pathology was reviewed by me and with the pathologist.  I reviewed the case with IR as well. Suggest IR biopsy as the lesion was submucosal and the colon biopsy was not definitive

## 2019-03-15 NOTE — PROGRESS NOTE ADULT - SUBJECTIVE AND OBJECTIVE BOX
PGY1 Note discussed with supervising resident and primary attending.    Patient is a 83y old  Male who presents with a chief complaint of weakness, lethargy (15 Mar 2019 12:52)      INTERVAL HPI/OVERNIGHT EVENTS:  pt seen and examined at bedside.  IR guided biopsy of sigmoid leison is scheduled on monday 3/18/19.  NPO from midnight on sunday. we will hold am DVT prophylaxis dose on monday.  Pt hyponatremia has resolved.   on case and discussion with son for rehab placement. Pt recommended rehab.  No overnight event.  biopsy of mass was missed by Sigmoidoscopy, since it was intraluminal.      MEDICATIONS  (STANDING):  artificial  tears Solution 1 Drop(s) Left EYE three times a day  carbidopa/levodopa  10/100 1 Tablet(s) Oral <User Schedule>  cholecalciferol 1000 Unit(s) Oral daily  enoxaparin Injectable 40 milliGRAM(s) SubCutaneous daily  erythromycin   Ointment 1 Application(s) Right EYE two times a day  insulin lispro (HumaLOG) corrective regimen sliding scale   SubCutaneous three times a day before meals  levothyroxine 100 MICROGram(s) Oral daily  piperacillin/tazobactam IVPB. 3.375 Gram(s) IV Intermittent every 8 hours  sodium chloride 0.9%. 1000 milliLiter(s) (75 mL/Hr) IV Continuous <Continuous>  tamsulosin 0.4 milliGRAM(s) Oral at bedtime    MEDICATIONS  (PRN):  acetaminophen   Tablet .. 650 milliGRAM(s) Oral every 6 hours PRN Temp greater or equal to 38C (100.4F)      Allergies    No Known Allergies    Intolerances        REVIEW OF SYSTEMS:  CONSTITUTIONAL: No fever, weight loss, or fatigue  RESPIRATORY: No cough, wheezing, chills or hemoptysis; No shortness of breath  CARDIOVASCULAR: No chest pain, palpitations, dizziness, or leg swelling  GASTROINTESTINAL: No abdominal or epigastric pain. No nausea, vomiting, or hematemesis; No diarrhea or constipation. No melena or hematochezia.  NEUROLOGICAL: No headaches, memory loss, loss of strength, numbness, or tremors  SKIN: No itching, burning, rashes, or lesions     Vital Signs Last 24 Hrs  T(C): 36.7 (15 Mar 2019 09:29), Max: 36.9 (15 Mar 2019 00:07)  T(F): 98 (15 Mar 2019 09:29), Max: 98.4 (15 Mar 2019 00:07)  HR: 70 (15 Mar 2019 09:29) (70 - 74)  BP: 133/61 (15 Mar 2019 09:29) (113/61 - 145/69)  BP(mean): --  RR: 17 (15 Mar 2019 09:29) (17 - 18)  SpO2: 100% (15 Mar 2019 09:29) (99% - 100%)    PHYSICAL EXAM:  GENERAL: NAD, well-groomed, well-developed  HEAD:  Atraumatic, Normocephalic  EYES: EOMI, PERRLA, conjunctiva and sclera clear  NECK: Supple, No JVD, Normal thyroid  CHEST/LUNG: Clear to percussion bilaterally; No rales, rhonchi, wheezing, or rubs  HEART: Regular rate and rhythm; No murmurs, rubs, or gallops  ABDOMEN: Soft, Nontender, Nondistended; Bowel sounds present  NERVOUS SYSTEM:  Alert & Oriented X3,   EXTREMITIES:  2+ Peripheral Pulses, No clubbing, cyanosis, or edema  SKIN;    LABS:                        9.3    11.55 )-----------( 401      ( 15 Mar 2019 06:29 )             30.2     03-15    136  |  103  |  11  ----------------------------<  105<H>  3.9   |  29  |  0.70    Ca    8.2<L>      15 Mar 2019 06:29          CAPILLARY BLOOD GLUCOSE      POCT Blood Glucose.: 167 mg/dL (15 Mar 2019 11:45)  POCT Blood Glucose.: 114 mg/dL (15 Mar 2019 08:40)  POCT Blood Glucose.: 156 mg/dL (14 Mar 2019 21:06)  POCT Blood Glucose.: 159 mg/dL (14 Mar 2019 16:27)        Consultant(s) Notes Reviewed:  [x] YES  [ ] NO

## 2019-03-15 NOTE — CHART NOTE - NSCHARTNOTEFT_GEN_A_CORE
EVENT: Pt c/o of left foot pain. Pt rated his pain as a 8 out of 10 on a pain scale of 1-10 with 10 being the worse pain.    OBJECTIVE:  Vital Signs Last 24 Hrs  T(C): 36.9 (15 Mar 2019 00:07), Max: 36.9 (15 Mar 2019 00:07)  T(F): 98.4 (15 Mar 2019 00:07), Max: 98.4 (15 Mar 2019 00:07)  HR: 74 (15 Mar 2019 00:07) (64 - 74)  BP: 145/69 (15 Mar 2019 00:07) (113/61 - 145/69)  BP(mean): --  RR: 18 (15 Mar 2019 00:07) (18 - 18)  SpO2: 99% (15 Mar 2019 00:07) (97% - 100%)    FOCUSED PHYSICAL EXAM:    Left foot with no swelling, edema or injury  Lungs CTA, bilaterally    LABS:                        9.3    11.55 )-----------( 401      ( 15 Mar 2019 06:29 )             30.2     03-15    136  |  103  |  11  ----------------------------<  105<H>  3.9   |  29  |  0.70    Ca    8.2<L>      15 Mar 2019 06:29        EKG:   IMAGING:    ASSESSMENT:  HPI:  83M with PMH of HTN, HLD, hypothyroidism, CHF with preserved EF, Parkinson's disease, comes in by EMS after visiting phlebotomist called 911. Per chart, patient was noted to be very dehydrated, and very weak. Wife at bedside shares that patient has not been eating or drinking liquids for the past several days and has been having waxing/waning confusion for the past day. Patient has had decreased movement, however unable to bear weight. Pt has been able to whispering, however overall much more weak, has not taken medication for the past 2 days. ED physician on admission initiated goals of care however wife Initiated goals of care, but wife has not decided. Pt too sick to have discussion about goals of care at present, will defer until patient more improved. (06 Mar 2019 16:51)      PLAN: Toradol 15mg, IVP x 1 dose was ordered    FOLLOW UP / RESULT: Will endorse to day staff to f/u.

## 2019-03-15 NOTE — PROGRESS NOTE ADULT - SUBJECTIVE AND OBJECTIVE BOX
Pt seen and examined at bedside in NAD. Pt tolerating diet, afebrile, HD stable, minimal abdominal pain having flatus and BM. Sigmoid biopsy results showed no malignancy likely sampling error. no other issues at this time.    PE:  VSS  AOx3 NAD  Abd: mild distention, palpable RLQ mass, nttp, no guarding nonperitoneal     labs/imaging: All labs and imaging reviewed    A/P: 83 M with large nonobstructing sigmoid mass and diffuse liver mets, sigmoid biopsy results showed no malignancy likely sample error   - No acute surgical intervention  - F/u GI  - F/u Heme/onc

## 2019-03-15 NOTE — PROGRESS NOTE ADULT - ATTENDING COMMENTS
Patient seen/evaluated at bedside 3/15/2019. I agree with the resident progress note/outlined plan of care. My independent findings and conclusions are documented.    84 y/o m w/ Parkinson's Disease admitted s/p fall, found to have  1. Bacteroides Fragilis Bacteremia  2. 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 Sepsis present on admission (with hypothermia, hypotension)  4. acute infectious encephalopathy (improved)  5. hyponatremia (resolved)  6. debility/ deconditioning  7. hypothyroidism  7. DM T II  8. Parkinson's Disease    zosyn, ID consult appreciated  -repeat blood cultures negative thus far--> possible discharge on flagyl and ceftin when leaving the hospital  CEA not elevated. s/p liver biopsy revealing spindle cell neoplasm. Sigmoid bx with inflammatory tissue but does not appear to include the mass. Discussed with GI who reviewed with pathology and interventional radiology. Suspicion very high for GIST; pt recommended for IR guided biopsy which will be done on Monday. Pt initially declined the procedure but was convinced by sons to have it done. In my presence he states he does now wants to pursue this study.   appreciate discussion with hematology-oncology and palliative care  input and management from GI and general surgery noted,  surgical oncology consulted--> no acute intervention  c/w home dose of synthroid and sinemet  hyponatremia responded to NS based fluid  dysphagia diet  dvt ppx, fall precautions with bed alarm  rest of plan as above .

## 2019-03-15 NOTE — PROGRESS NOTE ADULT - SUBJECTIVE AND OBJECTIVE BOX
Subjective:     No events overnight   Objective:    MEDICATIONS  (STANDING):  artificial  tears Solution 1 Drop(s) Left EYE three times a day  carbidopa/levodopa  10/100 1 Tablet(s) Oral <User Schedule>  cholecalciferol 1000 Unit(s) Oral daily  enoxaparin Injectable 40 milliGRAM(s) SubCutaneous daily  erythromycin   Ointment 1 Application(s) Right EYE two times a day  insulin lispro (HumaLOG) corrective regimen sliding scale   SubCutaneous three times a day before meals  levothyroxine 100 MICROGram(s) Oral daily  piperacillin/tazobactam IVPB. 3.375 Gram(s) IV Intermittent every 8 hours  sodium chloride 0.9%. 1000 milliLiter(s) (75 mL/Hr) IV Continuous <Continuous>  tamsulosin 0.4 milliGRAM(s) Oral at bedtime    MEDICATIONS  (PRN):  acetaminophen   Tablet .. 650 milliGRAM(s) Oral every 6 hours PRN Temp greater or equal to 38C (100.4F)              Vital Signs Last 24 Hrs  T(C): 36.7 (15 Mar 2019 09:29), Max: 36.9 (15 Mar 2019 00:07)  T(F): 98 (15 Mar 2019 09:29), Max: 98.4 (15 Mar 2019 00:07)  HR: 70 (15 Mar 2019 09:29) (70 - 74)  BP: 133/61 (15 Mar 2019 09:29) (113/61 - 145/69)  BP(mean): --  RR: 17 (15 Mar 2019 09:29) (17 - 18)  SpO2: 100% (15 Mar 2019 09:29) (99% - 100%)      General:  Well developed, well nourished, alert and active, no pallor, NAD.  HEENT:    Normal appearance of conjunctiva, ears, nose, lips, oropharynx, and oral mucosa, anicteric.  Neck:  No masses, no asymmetry.  Lymph Nodes:  No lymphadenopathy.   Cardiovascular:  RRR normal S1/S2, no murmur.  Respiratory:  CTA B/L, normal respiratory effort.   Abdominal:   soft, no masses or tenderness, normoactive BS, NT/ND, no HSM.  Extremities:   No clubbing or cyanosis, normal capillary refill, no edema.   Skin:   No rash, jaundice, lesions, eczema.   Musculoskeletal:  No joint swelling, erythema or tenderness.   Neuro: No focal deficits.   Other:       LABS:                        9.3    11.55 )-----------( 401      ( 15 Mar 2019 06:29 )             30.2     03-15    136  |  103  |  11  ----------------------------<  105<H>  3.9   |  29  |  0.70    Ca    8.2<L>      15 Mar 2019 06:29            RADIOLOGY & ADDITIONAL TESTS:

## 2019-03-15 NOTE — PROGRESS NOTE ADULT - ASSESSMENT
· Assessment	  Patient admitted for fatigue, increased lethargy, suspected sepsis given hypothermia and clinical condition, s/p Iv fluids and Zosyn in ED, and treated for suspected hypothyroid state, patient also noted to have colonic mass with multiple masses in liver, suspected for liver metastasis. Blood culture grew gm -ve bacteria, pending final result.     Problem/Plan - 1:  ·  Problem: Bacteremia.  Plan: -  resolved.   p/w hypothermia and hypotension, s/p code sepsis  - leukocytosis trending up, afebrile overnight  - blood culture shows bacteroides fragilis likely due to colonic mass  - on zosyn D9  - UA and CXR, CT head negative  - repeat blood culture are negative.  - ID Dr. Artis.      Problem/Plan - 2:  ·  Problem: Colonic mass.  Plan: - has palpable mass in Rt. lower abdomen, mildly tender, had bloody bowel movement before 3 nights, now no bowel complaints  - CT abdomen showed Multiple liver lesions consistent with metastases and large mass in the lower abdomen/upper pelvis surrounding the sigmoid   colon which may be secondary to just tumor, lymphoma or adenocarcinoma.   -  CT chest shows no mets with Bilateral pleural effusions and subsegmental atelectasis of basilar right lower lobe.  - occult negative  - normal CEA, CA 19-9,  - GI Dr. Murdock consulted- advised sigmoidoscopy for diagnosis and biopsy on monday if family agrees, NPO after MN, fleet enema in AM  - Hem-onc Dr. Camargo consulted  - Palliative consulted- PT. DNR/DNI  Pt had sigmoidoscopy on 03/12/19.     Problem/Plan - 3:  ·  Problem: Hypothyroidism.  Plan: p/w hypothermia of  temp of 94.7 rectally and hypotension, likely 2/2 hypothyroid state precipitated by missed dose and malignancy vs sepsis  s/p Hydrocortisone 50 mg IV x 1 dose then IV synthroid 50 microgm,  c/w synthroid 100mcg daily PO, (home dose)  pt. missed dose for 4 days  TSH within normal limits  normal free T4.      Problem/Plan - 4:  ·  Problem: Anemia.  Plan: occult negative  iron profile shows anemia of chronic disease with elevated ferritin likely due to malignancy  transfuse if Hb <7.        Problem/Plan - 5:  ·  Problem: Parkinson's disease.  Plan: continue with sinemet if patient able to tolerate PO at home dose  sinemet 10/100 TID.      Problem/Plan - 6:  Problem: Hypertension. Plan: hold off antihypertensives for now   BP well controlled off meds  continue to monitor BP.     Problem/Plan - 7:  ·  Problem: CHF (congestive heart failure).  Plan: not in exacerbation  patient on atenolol at home, will hold for now in setting of hypotension  normal echo with GII DD.      Problem/Plan - 8:  ·  Problem: Sacral decubitus ulcer, stage II.  Plan: does not look infected, cover daily, clean with NS  wound care nurse on board      Problem/Plan - 9:  ·  Problem: Diabetes.  Plan: - no previous history of diabetes  - A1C 6.7  - on hss  - diabetic education.      Problem/Plan - 10:  Problem: Need for prophylactic measure. Plan; IMPROVE VTE Individual Risk Assessment          RISK                                                          Points  [  ] Previous VTE                                                3  [  ] Thrombophilia                                             2  [  ] Lower limb paralysis                                   2        (unable to hold up >15 seconds)    [  ] Current Cancer                                             2         (within 6 months)  [  x] Immobilization > 24 hrs                              1  [  ] ICU/CCU stay > 24 hours                             1  [ x ] Age > 60                                                         1    IMPROVE VTE Score: 2    lovenox for vte prophylaxis.

## 2019-03-16 LAB
ANION GAP SERPL CALC-SCNC: 6 MMOL/L — SIGNIFICANT CHANGE UP (ref 5–17)
BUN SERPL-MCNC: 13 MG/DL — SIGNIFICANT CHANGE UP (ref 7–18)
CALCIUM SERPL-MCNC: 8.3 MG/DL — LOW (ref 8.4–10.5)
CHLORIDE SERPL-SCNC: 100 MMOL/L — SIGNIFICANT CHANGE UP (ref 96–108)
CO2 SERPL-SCNC: 27 MMOL/L — SIGNIFICANT CHANGE UP (ref 22–31)
CREAT SERPL-MCNC: 0.63 MG/DL — SIGNIFICANT CHANGE UP (ref 0.5–1.3)
GLUCOSE SERPL-MCNC: 156 MG/DL — HIGH (ref 70–99)
HCT VFR BLD CALC: 30.7 % — LOW (ref 39–50)
HGB BLD-MCNC: 9.4 G/DL — LOW (ref 13–17)
MCHC RBC-ENTMCNC: 24.4 PG — LOW (ref 27–34)
MCHC RBC-ENTMCNC: 30.6 GM/DL — LOW (ref 32–36)
MCV RBC AUTO: 79.7 FL — LOW (ref 80–100)
NRBC # BLD: 0 /100 WBCS — SIGNIFICANT CHANGE UP (ref 0–0)
PLATELET # BLD AUTO: 405 K/UL — HIGH (ref 150–400)
POTASSIUM SERPL-MCNC: 3.8 MMOL/L — SIGNIFICANT CHANGE UP (ref 3.5–5.3)
POTASSIUM SERPL-SCNC: 3.8 MMOL/L — SIGNIFICANT CHANGE UP (ref 3.5–5.3)
RBC # BLD: 3.85 M/UL — LOW (ref 4.2–5.8)
RBC # FLD: 15.9 % — HIGH (ref 10.3–14.5)
SODIUM SERPL-SCNC: 133 MMOL/L — LOW (ref 135–145)
WBC # BLD: 12.07 K/UL — HIGH (ref 3.8–10.5)
WBC # FLD AUTO: 12.07 K/UL — HIGH (ref 3.8–10.5)

## 2019-03-16 PROCEDURE — 99233 SBSQ HOSP IP/OBS HIGH 50: CPT | Mod: GC

## 2019-03-16 RX ADMIN — Medication 1 APPLICATION(S): at 18:11

## 2019-03-16 RX ADMIN — Medication 1000 UNIT(S): at 12:22

## 2019-03-16 RX ADMIN — TAMSULOSIN HYDROCHLORIDE 0.4 MILLIGRAM(S): 0.4 CAPSULE ORAL at 21:40

## 2019-03-16 RX ADMIN — Medication 1 APPLICATION(S): at 05:54

## 2019-03-16 RX ADMIN — Medication 1 DROP(S): at 13:49

## 2019-03-16 RX ADMIN — Medication 100 MICROGRAM(S): at 05:54

## 2019-03-16 RX ADMIN — PIPERACILLIN AND TAZOBACTAM 25 GRAM(S): 4; .5 INJECTION, POWDER, LYOPHILIZED, FOR SOLUTION INTRAVENOUS at 05:57

## 2019-03-16 RX ADMIN — PIPERACILLIN AND TAZOBACTAM 25 GRAM(S): 4; .5 INJECTION, POWDER, LYOPHILIZED, FOR SOLUTION INTRAVENOUS at 13:49

## 2019-03-16 RX ADMIN — ENOXAPARIN SODIUM 40 MILLIGRAM(S): 100 INJECTION SUBCUTANEOUS at 12:22

## 2019-03-16 RX ADMIN — Medication 1 DROP(S): at 21:40

## 2019-03-16 RX ADMIN — Medication 1 DROP(S): at 05:54

## 2019-03-16 RX ADMIN — Medication 1: at 12:22

## 2019-03-16 RX ADMIN — CARBIDOPA AND LEVODOPA 1 TABLET(S): 25; 100 TABLET ORAL at 12:22

## 2019-03-16 RX ADMIN — CARBIDOPA AND LEVODOPA 1 TABLET(S): 25; 100 TABLET ORAL at 05:54

## 2019-03-16 RX ADMIN — PIPERACILLIN AND TAZOBACTAM 25 GRAM(S): 4; .5 INJECTION, POWDER, LYOPHILIZED, FOR SOLUTION INTRAVENOUS at 21:39

## 2019-03-16 RX ADMIN — CARBIDOPA AND LEVODOPA 1 TABLET(S): 25; 100 TABLET ORAL at 18:11

## 2019-03-16 NOTE — PROGRESS NOTE ADULT - SUBJECTIVE AND OBJECTIVE BOX
Patient seen and examined at bedside. Resting comfortably in bed, in NAD. Hemodynamically stable and afebrile. Tolerating diet, denies N/V. Denies pain. Remains on Zosyn.     Vital Signs Last 24 Hrs  T(C): 36.3 (16 Mar 2019 07:52), Max: 36.7 (15 Mar 2019 09:29)  T(F): 97.3 (16 Mar 2019 07:52), Max: 98 (15 Mar 2019 09:29)  HR: 65 (16 Mar 2019 07:52) (65 - 74)  BP: 115/58 (16 Mar 2019 07:52) (110/58 - 133/68)  BP(mean): --  RR: 17 (16 Mar 2019 07:52) (17 - 17)  SpO2: 97% (16 Mar 2019 07:52) (97% - 100%)    General: AAOx3, resting in bed, in NAD  Resp: Reg resp effort on room air  Abd: Soft, ND, palpable mass in RLQ, non tender    Assessment:  83 M with metastatic colon mass    Plan:  -Strongly recommend AGAINST IR biopsy of sigmoid mass, as there is very high likelihood of colonic perforation  -Recommend repeat sigmoidoscopy and biopsy of lesion  -If repeat sigmoidoscopy and biopsy not feasible, patient will require EUS with deep biopsy at J    Plan discussed with primary team

## 2019-03-16 NOTE — PROGRESS NOTE ADULT - SUBJECTIVE AND OBJECTIVE BOX
83y Male who is under our care with bacteremia with bacteroides. No overnight events, pt is in bed, lethargic, has no complains. Pt is afebrile, leukocytosis - slight increase since yesterday, repeat blood cultures collected on 3/12/19  with no growth. Pt was seen by surgery today, recommend against IR biopsy of sigmoid mass as there is very high likelihood of colonic perforation. Pt denies being short of breath, on supplemental O2 via nasal cannula, no chest pain, no nausea, vomiting, no diarrhea.     MEDS:  piperacillin/tazobactam IVPB. 3.375 Gram(s) IV Intermittent every 8 hours    No Known Allergies      VITALS:  Vital Signs Last 24 Hrs  T(C): 36.3 (16 Mar 2019 07:52), Max: 36.6 (15 Mar 2019 16:33)  T(F): 97.3 (16 Mar 2019 07:52), Max: 97.9 (15 Mar 2019 16:33)  HR: 65 (16 Mar 2019 07:52) (65 - 74)  BP: 115/58 (16 Mar 2019 07:52) (110/58 - 133/68)  BP(mean): --  RR: 17 (16 Mar 2019 07:52) (17 - 17)  SpO2: 97% (16 Mar 2019 07:52) (97% - 98%)    LABS/DIAGNOSTIC TESTS:                          9.4    12.07 )-----------( 405      ( 16 Mar 2019 10:29 )             30.7     WBC trend  12.07  11.55  13.74  13.81  12.65    03-15    136  |  103  |  11  ----------------------------<  105<H>  3.9   |  29  |  0.70    Ca    8.2<L>      15 Mar 2019 06:29        CULTURES:   .Blood  03-12 @ 01:51   No growth to date.  --  --      .Blood  03-09 @ 09:56   Growth in anaerobic bottle: Bacteroides fragilis "Susceptibilities not  performed"  --    Growth in anaerobic bottle: Gram Negative Rods      .Urine  03-07 @ 00:49   No growth  --  --      .Blood  03-07 @ 00:36   Growth in anaerobic bottle: Bacteroides fragilis "Susceptibilities not  performed"  "Due to technical problems, Proteus sp. will Not be reported as part of  the BCID panel until further notice"  ***Blood Panel PCR results on this specimen are available  approximately 3 hours after the Gram stain result.***  Gram stain, PCR, and/or culture results may not always  correspond due to difference in methodologies.  ************************************************************  This PCR assay was performed using [employer].  The following targets are tested for: Enterococcus,  vancomycin resistant enterococci, Listeria monocytogenes,  coagulase negative staphylococci, S. aureus,  methicillin resistant S. aureus, Streptococcus agalactiae  (Group B), S. pneumoniae, S. pyogenes (Group A),  Acinetobacter baumannii, Enterobacter cloacae, E. coli,  Klebsiella oxytoca, K. pneumoniae, Proteus sp.,  Serratia marcescens, Haemophilus influenzae,  Neisseria meningitidis, Pseudomonas aeruginosa, Candida  albicans, C. glabrata, C krusei, C parapsilosis,  C. tropicalis and the KPC resistance gene.  --  Blood Culture PCR

## 2019-03-16 NOTE — PROGRESS NOTE ADULT - ASSESSMENT
Bacteremia     plan - cont zosyn 3.375gms iv q8hrs(D#10 of abxs)  if contemplating discharge will switch to ceftin/flagyl po at that time  he needs a total of 14 days of abxs

## 2019-03-16 NOTE — PROGRESS NOTE ADULT - ATTENDING COMMENTS
82 y/o m w/ Parkinson's Disease admitted s/p fall, found to have  1. Bacteroides Fragilis Bacteremia- resolving; complete antibiotics course. ID followup ongoing  2. 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 Suggest to consider endoscopic ultrasound with deep biopsy of the sigmoid lesion. No plan for surgical resection at this time as high suspicion for  GIST. Will discuss with medical oncologist.  3. Sepsis present on admission - now resolved  4. Metabolic encephalopathy- resolved  5. Deconditioning- OOB to chair; PT as tolerated  6. Parkinson's disease- stable; continue Sinemet  7. hypothyroidism- stable continue synthroid  8. T2DM- monitor f/s; continue insulin  9. Dysphagia- continue with dysphagia diet; maintain aspiration precautions

## 2019-03-16 NOTE — PROGRESS NOTE ADULT - SUBJECTIVE AND OBJECTIVE BOX
MEDICAL ATTENDING NOTE    Patient is a 83y old  Male who presents with a chief complaint of weakness, lethargy (16 Mar 2019 10:54)      INTERVAL HPI/OVERNIGHT EVENTS: no new complaints    MEDICATIONS  (STANDING):  artificial  tears Solution 1 Drop(s) Left EYE three times a day  carbidopa/levodopa  10/100 1 Tablet(s) Oral <User Schedule>  cholecalciferol 1000 Unit(s) Oral daily  enoxaparin Injectable 40 milliGRAM(s) SubCutaneous daily  erythromycin   Ointment 1 Application(s) Right EYE two times a day  insulin lispro (HumaLOG) corrective regimen sliding scale   SubCutaneous three times a day before meals  levothyroxine 100 MICROGram(s) Oral daily  piperacillin/tazobactam IVPB. 3.375 Gram(s) IV Intermittent every 8 hours  sodium chloride 0.9%. 1000 milliLiter(s) (75 mL/Hr) IV Continuous <Continuous>  tamsulosin 0.4 milliGRAM(s) Oral at bedtime    MEDICATIONS  (PRN):  acetaminophen   Tablet .. 650 milliGRAM(s) Oral every 6 hours PRN Temp greater or equal to 38C (100.4F)      __________________________________________________  ----------------------------------------------------------------------------------  REVIEW OF SYSTEMS: no fever, no SOB, No Chest pain; feels well      Vital Signs Last 24 Hrs  T(C): 36.3 (16 Mar 2019 07:52), Max: 36.6 (15 Mar 2019 16:33)  T(F): 97.3 (16 Mar 2019 07:52), Max: 97.9 (15 Mar 2019 16:33)  HR: 65 (16 Mar 2019 07:52) (65 - 74)  BP: 115/58 (16 Mar 2019 07:52) (110/58 - 133/68)  BP(mean): --  RR: 17 (16 Mar 2019 07:52) (17 - 17)  SpO2: 97% (16 Mar 2019 07:52) (97% - 98%)    _________________  PHYSICAL EXAM:  ---------------------------   NAD; Normocephalic;   LUNGS - no wheezing  HEART: S1 S2+   ABDOMEN: Soft, Nontender, non distended; BS+  EXTREMITIES: no cyanosis; no edema  NERVOUS SYSTEM:  Awake and alert; no focal deficits; moves all extremities    _________________________________________________  LABS:                        9.4    12.07 )-----------( 405      ( 16 Mar 2019 10:29 )             30.7     03-16    133<L>  |  100  |  13  ----------------------------<  156<H>  3.8   |  27  |  0.63    Ca    8.3<L>      16 Mar 2019 10:29          CAPILLARY BLOOD GLUCOSE      POCT Blood Glucose.: 113 mg/dL (16 Mar 2019 08:38)  POCT Blood Glucose.: 157 mg/dL (15 Mar 2019 21:22)  POCT Blood Glucose.: 111 mg/dL (15 Mar 2019 16:57)  POCT Blood Glucose.: 167 mg/dL (15 Mar 2019 11:45)            Care Discussed with Consultants :     Plan of care was discussed with patient ; all questions and concerns were addressed and care was aligned with patient's wishes.

## 2019-03-17 LAB
ANION GAP SERPL CALC-SCNC: 5 MMOL/L — SIGNIFICANT CHANGE UP (ref 5–17)
BUN SERPL-MCNC: 13 MG/DL — SIGNIFICANT CHANGE UP (ref 7–18)
CALCIUM SERPL-MCNC: 8.8 MG/DL — SIGNIFICANT CHANGE UP (ref 8.4–10.5)
CHLORIDE SERPL-SCNC: 102 MMOL/L — SIGNIFICANT CHANGE UP (ref 96–108)
CO2 SERPL-SCNC: 27 MMOL/L — SIGNIFICANT CHANGE UP (ref 22–31)
CREAT SERPL-MCNC: 0.64 MG/DL — SIGNIFICANT CHANGE UP (ref 0.5–1.3)
CULTURE RESULTS: SIGNIFICANT CHANGE UP
CULTURE RESULTS: SIGNIFICANT CHANGE UP
GLUCOSE SERPL-MCNC: 119 MG/DL — HIGH (ref 70–99)
HCT VFR BLD CALC: 32.7 % — LOW (ref 39–50)
HGB BLD-MCNC: 10.1 G/DL — LOW (ref 13–17)
MCHC RBC-ENTMCNC: 24.5 PG — LOW (ref 27–34)
MCHC RBC-ENTMCNC: 30.9 GM/DL — LOW (ref 32–36)
MCV RBC AUTO: 79.2 FL — LOW (ref 80–100)
NRBC # BLD: 0 /100 WBCS — SIGNIFICANT CHANGE UP (ref 0–0)
PLATELET # BLD AUTO: 473 K/UL — HIGH (ref 150–400)
POTASSIUM SERPL-MCNC: 4.1 MMOL/L — SIGNIFICANT CHANGE UP (ref 3.5–5.3)
POTASSIUM SERPL-SCNC: 4.1 MMOL/L — SIGNIFICANT CHANGE UP (ref 3.5–5.3)
RBC # BLD: 4.13 M/UL — LOW (ref 4.2–5.8)
RBC # FLD: 15.9 % — HIGH (ref 10.3–14.5)
SODIUM SERPL-SCNC: 134 MMOL/L — LOW (ref 135–145)
SPECIMEN SOURCE: SIGNIFICANT CHANGE UP
SPECIMEN SOURCE: SIGNIFICANT CHANGE UP
WBC # BLD: 11.26 K/UL — HIGH (ref 3.8–10.5)
WBC # FLD AUTO: 11.26 K/UL — HIGH (ref 3.8–10.5)

## 2019-03-17 PROCEDURE — 99233 SBSQ HOSP IP/OBS HIGH 50: CPT | Mod: GC

## 2019-03-17 RX ORDER — SODIUM CHLORIDE 9 MG/ML
1000 INJECTION INTRAMUSCULAR; INTRAVENOUS; SUBCUTANEOUS
Qty: 0 | Refills: 0 | Status: DISCONTINUED | OUTPATIENT
Start: 2019-03-17 | End: 2019-03-19

## 2019-03-17 RX ADMIN — ENOXAPARIN SODIUM 40 MILLIGRAM(S): 100 INJECTION SUBCUTANEOUS at 11:32

## 2019-03-17 RX ADMIN — TAMSULOSIN HYDROCHLORIDE 0.4 MILLIGRAM(S): 0.4 CAPSULE ORAL at 22:22

## 2019-03-17 RX ADMIN — CARBIDOPA AND LEVODOPA 1 TABLET(S): 25; 100 TABLET ORAL at 17:05

## 2019-03-17 RX ADMIN — Medication 1 APPLICATION(S): at 17:05

## 2019-03-17 RX ADMIN — CARBIDOPA AND LEVODOPA 1 TABLET(S): 25; 100 TABLET ORAL at 05:16

## 2019-03-17 RX ADMIN — PIPERACILLIN AND TAZOBACTAM 25 GRAM(S): 4; .5 INJECTION, POWDER, LYOPHILIZED, FOR SOLUTION INTRAVENOUS at 22:22

## 2019-03-17 RX ADMIN — CARBIDOPA AND LEVODOPA 1 TABLET(S): 25; 100 TABLET ORAL at 11:32

## 2019-03-17 RX ADMIN — Medication 1 DROP(S): at 13:17

## 2019-03-17 RX ADMIN — Medication 100 MICROGRAM(S): at 05:16

## 2019-03-17 RX ADMIN — Medication 1 DROP(S): at 22:22

## 2019-03-17 RX ADMIN — Medication 1000 UNIT(S): at 11:32

## 2019-03-17 RX ADMIN — Medication 1 DROP(S): at 05:17

## 2019-03-17 RX ADMIN — SODIUM CHLORIDE 60 MILLILITER(S): 9 INJECTION INTRAMUSCULAR; INTRAVENOUS; SUBCUTANEOUS at 05:16

## 2019-03-17 RX ADMIN — PIPERACILLIN AND TAZOBACTAM 25 GRAM(S): 4; .5 INJECTION, POWDER, LYOPHILIZED, FOR SOLUTION INTRAVENOUS at 05:17

## 2019-03-17 RX ADMIN — PIPERACILLIN AND TAZOBACTAM 25 GRAM(S): 4; .5 INJECTION, POWDER, LYOPHILIZED, FOR SOLUTION INTRAVENOUS at 13:17

## 2019-03-17 RX ADMIN — Medication 1 APPLICATION(S): at 05:17

## 2019-03-17 NOTE — PROGRESS NOTE ADULT - NEGATIVE GASTROINTESTINAL SYMPTOMS
no nausea/no vomiting/no abdominal pain/no diarrhea
no diarrhea/no vomiting
no diarrhea
no diarrhea/no abdominal pain/no nausea/no vomiting

## 2019-03-17 NOTE — PROGRESS NOTE ADULT - SUBJECTIVE AND OBJECTIVE BOX
PGY1 Note discussed with supervising resident and primary attending.    Patient is a 83y old  Male who presents with a chief complaint of weakness, lethargy (16 Mar 2019 11:29)      INTERVAL HPI/OVERNIGHT EVENTS:  pt seen and examined at bedside.  No acute overnight event.  Pt is on schedule for IR guided biopsy on monday.  NPO after midnight.  day 11 of IV zosyn, Per ID total of 14 days of antibiotics. we can switch to oral on discharge.  Pt recommended NIDHI.  Blood sugars are controlled.  Pt again became hyponatremic to 133. we have restarted normal saline for 12 hours, will follow BMP in am.    MEDICATIONS  (STANDING):  artificial  tears Solution 1 Drop(s) Left EYE three times a day  carbidopa/levodopa  10/100 1 Tablet(s) Oral <User Schedule>  cholecalciferol 1000 Unit(s) Oral daily  enoxaparin Injectable 40 milliGRAM(s) SubCutaneous daily  erythromycin   Ointment 1 Application(s) Right EYE two times a day  insulin lispro (HumaLOG) corrective regimen sliding scale   SubCutaneous three times a day before meals  levothyroxine 100 MICROGram(s) Oral daily  piperacillin/tazobactam IVPB. 3.375 Gram(s) IV Intermittent every 8 hours  sodium chloride 0.9%. 1000 milliLiter(s) (75 mL/Hr) IV Continuous <Continuous>  tamsulosin 0.4 milliGRAM(s) Oral at bedtime    MEDICATIONS  (PRN):  acetaminophen   Tablet .. 650 milliGRAM(s) Oral every 6 hours PRN Temp greater or equal to 38C (100.4F)      Allergies    No Known Allergies    Intolerances        REVIEW OF SYSTEMS:  CONSTITUTIONAL: No fever, weight loss, or fatigue  RESPIRATORY: No cough, wheezing, chills or hemoptysis; No shortness of breath  CARDIOVASCULAR: No chest pain, palpitations, dizziness, or leg swelling  GASTROINTESTINAL: No abdominal or epigastric pain. No nausea, vomiting, or hematemesis; No diarrhea or constipation. No melena or hematochezia.  NEUROLOGICAL: No headaches, memory loss, loss of strength, numbness, or tremors  SKIN: No itching, burning, rashes, or lesions     Vital Signs Last 24 Hrs  T(C): 36.4 (17 Mar 2019 01:01), Max: 36.4 (17 Mar 2019 01:01)  T(F): 97.5 (17 Mar 2019 01:01), Max: 97.5 (17 Mar 2019 01:01)  HR: 77 (17 Mar 2019 01:01) (65 - 77)  BP: 125/63 (17 Mar 2019 01:01) (115/58 - 125/63)  BP(mean): --  RR: 17 (17 Mar 2019 01:01) (17 - 17)  SpO2: 99% (17 Mar 2019 01:01) (97% - 99%)    PHYSICAL EXAM:  GENERAL: NAD, well-groomed, well-developed  HEAD:  Atraumatic, Normocephalic  EYES: EOMI, PERRLA, conjunctiva and sclera clear  NECK: Supple, No JVD, Normal thyroid  CHEST/LUNG: Clear to percussion bilaterally; No rales, rhonchi, wheezing, or rubs  HEART: Regular rate and rhythm; No murmurs, rubs, or gallops  ABDOMEN: Soft, Nontender, Nondistended; Bowel sounds present  NERVOUS SYSTEM:  Alert & Oriented X3,   EXTREMITIES:  2+ Peripheral Pulses, No clubbing, cyanosis, or edema  SKIN;    LABS:                        9.4    12.07 )-----------( 405      ( 16 Mar 2019 10:29 )             30.7     03-16    133<L>  |  100  |  13  ----------------------------<  156<H>  3.8   |  27  |  0.63    Ca    8.3<L>      16 Mar 2019 10:29          CAPILLARY BLOOD GLUCOSE      POCT Blood Glucose.: 179 mg/dL (16 Mar 2019 21:03)  POCT Blood Glucose.: 129 mg/dL (16 Mar 2019 17:14)  POCT Blood Glucose.: 155 mg/dL (16 Mar 2019 11:57)  POCT Blood Glucose.: 113 mg/dL (16 Mar 2019 08:38)        Consultant(s) Notes Reviewed:  [x] YES  [ ] NO

## 2019-03-17 NOTE — PROGRESS NOTE ADULT - ASSESSMENT
· Assessment	  · Assessment	  Patient admitted for fatigue, increased lethargy, suspected sepsis given hypothermia and clinical condition, s/p Iv fluids and Zosyn in ED, and treated for suspected hypothyroid state, patient also noted to have colonic mass with multiple masses in liver, suspected for liver metastasis. Blood culture grew gm -ve bacteria, pending final result.     Problem/Plan - 1:  ·  Problem: Bacteremia.  Plan: -  resolved.   p/w hypothermia and hypotension, s/p code sepsis  - leukocytosis trending up, afebrile overnight  - blood culture shows bacteroides fragilis likely due to colonic mass  - on zosyn D9  - UA and CXR, CT head negative  - repeat blood culture are negative.  - ID Dr. Artis.      Problem/Plan - 2:  ·  Problem: Colonic mass.  Plan: - has palpable mass in Rt. lower abdomen, mildly tender, had bloody bowel movement before 3 nights, now no bowel complaints  - CT abdomen showed Multiple liver lesions consistent with metastases and large mass in the lower abdomen/upper pelvis surrounding the sigmoid   colon which may be secondary to just tumor, lymphoma or adenocarcinoma.   -  CT chest shows no mets with Bilateral pleural effusions and subsegmental atelectasis of basilar right lower lobe.  - occult negative  - normal CEA, CA 19-9,  - GI Dr. Murdock consulted- advised sigmoidoscopy for diagnosis and biopsy on monday if family agrees, NPO after MN, fleet enema in AM  - Hem-onc Dr. Camargo consulted  - Palliative consulted- PT. DNR/DNI  Pt had sigmoidoscopy on 03/12/19.     Problem/Plan - 3:  ·  Problem: Hypothyroidism.  Plan: p/w hypothermia of  temp of 94.7 rectally and hypotension, likely 2/2 hypothyroid state precipitated by missed dose and malignancy vs sepsis  s/p Hydrocortisone 50 mg IV x 1 dose then IV synthroid 50 microgm,  c/w synthroid 100mcg daily PO, (home dose)  pt. missed dose for 4 days  TSH within normal limits  normal free T4.      Problem/Plan - 4:  ·  Problem: Anemia.  Plan: occult negative  iron profile shows anemia of chronic disease with elevated ferritin likely due to malignancy  transfuse if Hb <7.        Problem/Plan - 5:  ·  Problem: Parkinson's disease.  Plan: continue with sinemet if patient able to tolerate PO at home dose  sinemet 10/100 TID.      Problem/Plan - 6:  Problem: Hypertension. Plan: hold off antihypertensives for now   BP well controlled off meds  continue to monitor BP.     Problem/Plan - 7:  ·  Problem: CHF (congestive heart failure).  Plan: not in exacerbation  patient on atenolol at home, will hold for now in setting of hypotension  normal echo with GII DD.      Problem/Plan - 8:  ·  Problem: Sacral decubitus ulcer, stage II.  Plan: does not look infected, cover daily, clean with NS  wound care nurse on board      Problem/Plan - 9:  ·  Problem: Diabetes.  Plan: - no previous history of diabetes  - A1C 6.7  - on hss  - diabetic education.      Problem/Plan - 10:  Problem: Need for prophylactic measure. Plan; IMPROVE VTE Individual Risk Assessment          RISK                                                          Points  [  ] Previous VTE                                                3  [  ] Thrombophilia                                             2  [  ] Lower limb paralysis                                   2        (unable to hold up >15 seconds)    [  ] Current Cancer                                             2         (within 6 months)  [  x] Immobilization > 24 hrs                              1  [  ] ICU/CCU stay > 24 hours                             1  [ x ] Age > 60                                                         1    IMPROVE VTE Score: 2    lovenox for vte prophylaxis.

## 2019-03-17 NOTE — PROGRESS NOTE ADULT - RS GEN PE MLT RESP DETAILS PC
no wheezes/clear to auscultation bilaterally/no rales/no rhonchi/good air movement
good air movement/clear to auscultation bilaterally/breath sounds equal/respirations non-labored/airway patent
no rhonchi/good air movement/no rales/no wheezes
respirations non-labored/breath sounds equal/clear to auscultation bilaterally/airway patent

## 2019-03-17 NOTE — PROGRESS NOTE ADULT - SUBJECTIVE AND OBJECTIVE BOX
MEDICAL ONCOLOGY CONSULTATION f/up    S: Events noted; pt feels weak    Vital Signs Last 24 Hrs  T(C): 36.4 (17 Mar 2019 01:01), Max: 36.4 (17 Mar 2019 01:01)  T(F): 97.5 (17 Mar 2019 01:01), Max: 97.5 (17 Mar 2019 01:01)  HR: 77 (17 Mar 2019 01:01) (69 - 77)  BP: 125/63 (17 Mar 2019 01:01) (115/64 - 125/63)  RR: 17 (17 Mar 2019 01:01) (17 - 17)  SpO2: 99% (17 Mar 2019 01:01) (99% - 99%)    NAD; opens eyes to voice and answers simple questions and follows directions; A and O x 2  PERRL; EOMI  supple; MMM  Nl S1 S2 RR; no  M  Ab with decreased BS; no guarding  warm and dry; + edema    Labs                        9.6    13.74 )-----------( 367      ( 14 Mar 2019 05:41)             31.4                             10.1   11.26 )-----------( 473      ( 17 Mar 2019 06:35 )             32.7       03-14    131<L>  |  98  |  14  ----------------------------<  112<H>  3.7   |  27  |  0.64    Ca    8.4      14 Mar 2019 05:41      03-17    134<L>  |  102  |  13  ----------------------------<  119<H>  4.1   |  27  |  0.64    Ca    8.8      17 Mar 2019 06:35

## 2019-03-17 NOTE — PROGRESS NOTE ADULT - ASSESSMENT
Bacteremia     plan - cont zosyn 3.375gms iv q8hrs(D#11 of abxs)  if contemplating discharge will switch to ceftin/flagyl po at that time  he needs a total of 14 days of abxs Bacteremia     plan - cont zosyn 3.375gms iv q8hrs(D#11 of abxs)  if contemplating discharge will switch to ceftin 500mgs po bid and flagyl 500mgs  po tid at that time to complete a total of 14 days of abxs  reconsult prn

## 2019-03-17 NOTE — PROGRESS NOTE ADULT - SUBJECTIVE AND OBJECTIVE BOX
83y Male who is under our care with bacteremia with bacteroides. No overnight events, pt is in bed, comfortable, lethargic. Pt is afebrile, leukocytosis - slight decrease since yesterday, repeat blood cultures collected on 3/12/19  with no growth.  Pt denies being short of breath, on supplemental O2 via nasal cannula, no chest pain, no nausea, vomiting, no diarrhea.   Pending GI f/up for repeat sigmoidoscopy and biopsy vs transfer to [facility] for EUS and biopsy, no IR guided sigmoid biopsy due to high risk of colonic perforation.     MEDS:  piperacillin/tazobactam IVPB. 3.375 Gram(s) IV Intermittent every 8 hours    No Known Allergies      VITALS:  Vital Signs Last 24 Hrs  T(C): 36.4 (17 Mar 2019 01:01), Max: 36.4 (17 Mar 2019 01:01)  T(F): 97.5 (17 Mar 2019 01:01), Max: 97.5 (17 Mar 2019 01:01)  HR: 77 (17 Mar 2019 01:01) (69 - 77)  BP: 125/63 (17 Mar 2019 01:01) (115/64 - 125/63)  BP(mean): --  RR: 17 (17 Mar 2019 01:01) (17 - 17)  SpO2: 99% (17 Mar 2019 01:01) (99% - 99%)    LABS/DIAGNOSTIC TESTS:                          10.1   11.26 )-----------( 473      ( 17 Mar 2019 06:35 )             32.7         03-17    134<L>  |  102  |  13  ----------------------------<  119<H>  4.1   |  27  |  0.64    Ca    8.8      17 Mar 2019 06:35        CULTURES:   .Blood  03-12 @ 01:51   No growth at 5 days.  --  --      .Blood  03-09 @ 09:56   Growth in anaerobic bottle: Bacteroides fragilis "Susceptibilities not  performed"  --    Growth in anaerobic bottle: Gram Negative Rods      .Urine  03-07 @ 00:49   No growth  --  --      .Blood  03-07 @ 00:36   Growth in anaerobic bottle: Bacteroides fragilis "Susceptibilities not  performed"  "Due to technical problems, Proteus sp. will Not be reported as part of  the BCID panel until further notice"  ***Blood Panel PCR results on this specimen are available  approximately 3 hours after the Gram stain result.***  Gram stain, PCR, and/or culture results may not always  correspond due to difference in methodologies.  ************************************************************  This PCR assay was performed using [employer].  The following targets are tested for: Enterococcus,  vancomycin resistant enterococci, Listeria monocytogenes,  coagulase negative staphylococci, S. aureus,  methicillin resistant S. aureus, Streptococcus agalactiae  (Group B), S. pneumoniae, S. pyogenes (Group A),  Acinetobacter baumannii, Enterobacter cloacae, E. coli,  Klebsiella oxytoca, K. pneumoniae, Proteus sp.,  Serratia marcescens, Haemophilus influenzae,  Neisseria meningitidis, Pseudomonas aeruginosa, Candida  albicans, C. glabrata, C krusei, C parapsilosis,  C. tropicalis and the KPC resistance gene.  --  Blood Culture PCR

## 2019-03-17 NOTE — PROGRESS NOTE ADULT - [facility] HE PE MLT D E PC
regular rate and rhythm
regular rate and rhythm/no murmur

## 2019-03-17 NOTE — PROGRESS NOTE ADULT - SUBJECTIVE AND OBJECTIVE BOX
Patient seen and examined at bedside. Resting comfortably in bed, in NAD. Hemodynamically stable and afebrile. Continues to deny pain, nausea, and vomiting.     Vital Signs Last 24 Hrs  T(C): 36.4 (17 Mar 2019 01:01), Max: 36.4 (17 Mar 2019 01:01)  T(F): 97.5 (17 Mar 2019 01:01), Max: 97.5 (17 Mar 2019 01:01)  HR: 77 (17 Mar 2019 01:01) (69 - 77)  BP: 125/63 (17 Mar 2019 01:01) (115/64 - 125/63)  BP(mean): --  RR: 17 (17 Mar 2019 01:01) (17 - 17)  SpO2: 99% (17 Mar 2019 01:01) (99% - 99%)                          10.1   11.26 )-----------( 473      ( 17 Mar 2019 06:35 )             32.7   03-17    134<L>  |  102  |  13  ----------------------------<  119<H>  4.1   |  27  |  0.64    Ca    8.8      17 Mar 2019 06:35    General: AAOx3, resting in bed, in NAD  Resp: Reg resp effort  Abd: Soft, ND, NT, palpable mass in RLQ    Assessment:  83 M with metastatic colon mass    Plan:  -F/u GI for repeat sigmoidoscopy and biopsy vs transfer to [facility] for EUS and biopsy  -No IR guided sigmoid biopsy due to high risk of colonic perforation  -Monitor vitals  -Monitor bowel function  -Mgmt per primary team

## 2019-03-17 NOTE — PROGRESS NOTE ADULT - ATTENDING COMMENTS
Patient seen/evaluated at bedside 3/18/2019. I agree with the resident progress note/outlined plan of care. My independent findings and conclusions are documented.    Case discussed with Surgical Oncology Attending Dr. Rowell and Heme onc Attending Dr. Camargo. No further biopies planned at this time as risk would not outweight benefit. Patient aware and in agreement.    82 y/o m w/ Parkinson's Disease admitted s/p fall, found to have  1. Bacteroides Fragilis Bacteremia- resolving; complete antibiotics course. ID followup ongoing  2. 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 Sepsis present on admission - now resolved  4. Metabolic encephalopathy- resolved  5. Deconditioning- OOB to chair; PT as tolerated  6. Parkinson's disease- stable; continue Sinemet  7. hypothyroidism- stable continue synthroid  8. T2DM- monitor f/s; continue insulin  9. Dysphagia- continue with dysphagia diet; maintain aspiration precautions.     appreciate discussions with GI, heme onc, surgical oncology  plan to proceed with discharge planning, follow up with oncology with imatinib  currently on day 11 of zosyn

## 2019-03-17 NOTE — PROGRESS NOTE ADULT - GASTROINTESTINAL DETAILS
no guarding/bowel sounds normal/soft/no rigidity
soft/no distention/bowel sounds normal
no guarding/soft/bowel sounds normal/no rigidity/no organomegaly
bowel sounds normal/nontender/no distention/soft

## 2019-03-17 NOTE — PROGRESS NOTE ADULT - ASSESSMENT
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; tissue specimen is adequate   -awaiting final pathology but if GIST is confirmed, pt would benefit from palliative systemic therapy with oral TKI agent such as imatinib  -however, it is unclear whether pt's ECOG performance status will improve from his sepsis  -goals of care discussion has been initiated with pt and his sons (pt's spouse has dementia and does not have capacity to be HCP); 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  -surgery input noted; I agree that there is no role for CT guided bx of the ab mass, which has high risks for unnecessary complication, given pathology department already has adequate tissue specimen from the liver bx done on 3/11/19; wait for the final pathology from the liver biopsy; I have reached out to pt's son Terell  -it is appropriate to obtain hospiece/supportive care evaluation during this hospitalization given gravity of pt's condition    Problem #2 Anemia   -multifactorial including anemia of chronic disease as well as possible GI bleed  -transfuse to keep hg > 7 during the acute infection    Problem #3 Sepsis - pt is stable with abx tx of the baceteremia but still with poor ECOG PS

## 2019-03-18 LAB
ANION GAP SERPL CALC-SCNC: 7 MMOL/L — SIGNIFICANT CHANGE UP (ref 5–17)
BUN SERPL-MCNC: 15 MG/DL — SIGNIFICANT CHANGE UP (ref 7–18)
CALCIUM SERPL-MCNC: 8.9 MG/DL — SIGNIFICANT CHANGE UP (ref 8.4–10.5)
CHLORIDE SERPL-SCNC: 99 MMOL/L — SIGNIFICANT CHANGE UP (ref 96–108)
CO2 SERPL-SCNC: 27 MMOL/L — SIGNIFICANT CHANGE UP (ref 22–31)
CREAT SERPL-MCNC: 0.63 MG/DL — SIGNIFICANT CHANGE UP (ref 0.5–1.3)
GLUCOSE SERPL-MCNC: 94 MG/DL — SIGNIFICANT CHANGE UP (ref 70–99)
POTASSIUM SERPL-MCNC: 4.5 MMOL/L — SIGNIFICANT CHANGE UP (ref 3.5–5.3)
POTASSIUM SERPL-SCNC: 4.5 MMOL/L — SIGNIFICANT CHANGE UP (ref 3.5–5.3)
SODIUM SERPL-SCNC: 133 MMOL/L — LOW (ref 135–145)

## 2019-03-18 PROCEDURE — 99233 SBSQ HOSP IP/OBS HIGH 50: CPT | Mod: GC

## 2019-03-18 RX ORDER — METOPROLOL TARTRATE 50 MG
12.5 TABLET ORAL
Qty: 0 | Refills: 0 | Status: DISCONTINUED | OUTPATIENT
Start: 2019-03-18 | End: 2019-03-19

## 2019-03-18 RX ORDER — OXYCODONE AND ACETAMINOPHEN 5; 325 MG/1; MG/1
1 TABLET ORAL ONCE
Qty: 0 | Refills: 0 | Status: DISCONTINUED | OUTPATIENT
Start: 2019-03-18 | End: 2019-03-18

## 2019-03-18 RX ORDER — APIXABAN 2.5 MG/1
5 TABLET, FILM COATED ORAL EVERY 12 HOURS
Qty: 0 | Refills: 0 | Status: DISCONTINUED | OUTPATIENT
Start: 2019-03-18 | End: 2019-03-19

## 2019-03-18 RX ORDER — METOPROLOL TARTRATE 50 MG
2.5 TABLET ORAL EVERY 6 HOURS
Qty: 0 | Refills: 0 | Status: DISCONTINUED | OUTPATIENT
Start: 2019-03-18 | End: 2019-03-19

## 2019-03-18 RX ADMIN — Medication 1: at 17:40

## 2019-03-18 RX ADMIN — Medication 1 APPLICATION(S): at 17:40

## 2019-03-18 RX ADMIN — Medication 1000 UNIT(S): at 13:05

## 2019-03-18 RX ADMIN — CARBIDOPA AND LEVODOPA 1 TABLET(S): 25; 100 TABLET ORAL at 06:02

## 2019-03-18 RX ADMIN — ENOXAPARIN SODIUM 40 MILLIGRAM(S): 100 INJECTION SUBCUTANEOUS at 13:05

## 2019-03-18 RX ADMIN — CARBIDOPA AND LEVODOPA 1 TABLET(S): 25; 100 TABLET ORAL at 17:40

## 2019-03-18 RX ADMIN — Medication 1 DROP(S): at 22:16

## 2019-03-18 RX ADMIN — Medication 1 APPLICATION(S): at 06:02

## 2019-03-18 RX ADMIN — Medication 1 DROP(S): at 06:02

## 2019-03-18 RX ADMIN — PIPERACILLIN AND TAZOBACTAM 25 GRAM(S): 4; .5 INJECTION, POWDER, LYOPHILIZED, FOR SOLUTION INTRAVENOUS at 06:03

## 2019-03-18 RX ADMIN — Medication 100 MICROGRAM(S): at 06:02

## 2019-03-18 RX ADMIN — CARBIDOPA AND LEVODOPA 1 TABLET(S): 25; 100 TABLET ORAL at 13:05

## 2019-03-18 RX ADMIN — PIPERACILLIN AND TAZOBACTAM 25 GRAM(S): 4; .5 INJECTION, POWDER, LYOPHILIZED, FOR SOLUTION INTRAVENOUS at 22:15

## 2019-03-18 RX ADMIN — Medication 1 DROP(S): at 13:05

## 2019-03-18 RX ADMIN — PIPERACILLIN AND TAZOBACTAM 25 GRAM(S): 4; .5 INJECTION, POWDER, LYOPHILIZED, FOR SOLUTION INTRAVENOUS at 13:05

## 2019-03-18 RX ADMIN — TAMSULOSIN HYDROCHLORIDE 0.4 MILLIGRAM(S): 0.4 CAPSULE ORAL at 22:16

## 2019-03-18 NOTE — PROGRESS NOTE ADULT - SUBJECTIVE AND OBJECTIVE BOX
PGY1 Note discussed with supervising resident and primary attending.    Patient is a 83y old  Male who presents with a chief complaint of weakness, lethargy (18 Mar 2019 07:52)      INTERVAL HPI/OVERNIGHT EVENTS:  pt seen and examined at bedside.  No overnight events.  Plan of sigmoid biopsy is cancelled because of risk of colon perforation per hematologist/oncologist and surgical oncologist.  Case managment on case.  Pt is too weak to go home.  Working on rehab placement.  will be discharged on oral antibiotics Ceftin and augmentin for a total antibiotic duration of 14 days per ID.    MEDICATIONS  (STANDING):  artificial  tears Solution 1 Drop(s) Left EYE three times a day  carbidopa/levodopa  10/100 1 Tablet(s) Oral <User Schedule>  cholecalciferol 1000 Unit(s) Oral daily  enoxaparin Injectable 40 milliGRAM(s) SubCutaneous daily  erythromycin   Ointment 1 Application(s) Right EYE two times a day  insulin lispro (HumaLOG) corrective regimen sliding scale   SubCutaneous three times a day before meals  levothyroxine 100 MICROGram(s) Oral daily  piperacillin/tazobactam IVPB. 3.375 Gram(s) IV Intermittent every 8 hours  sodium chloride 0.9%. 1000 milliLiter(s) (60 mL/Hr) IV Continuous <Continuous>  sodium chloride 0.9%. 1000 milliLiter(s) (75 mL/Hr) IV Continuous <Continuous>  tamsulosin 0.4 milliGRAM(s) Oral at bedtime    MEDICATIONS  (PRN):  acetaminophen   Tablet .. 650 milliGRAM(s) Oral every 6 hours PRN Temp greater or equal to 38C (100.4F)      Allergies    No Known Allergies    Intolerances        REVIEW OF SYSTEMS:  CONSTITUTIONAL: No fever, weight loss, or fatigue  RESPIRATORY: No cough, wheezing, chills or hemoptysis; No shortness of breath  CARDIOVASCULAR: No chest pain, palpitations, dizziness, or leg swelling  GASTROINTESTINAL: No abdominal or epigastric pain. No nausea, vomiting, or hematemesis; No diarrhea or constipation. No melena or hematochezia.  NEUROLOGICAL: No headaches, memory loss, loss of strength, numbness, or tremors  SKIN: No itching, burning, rashes, or lesions     Vital Signs Last 24 Hrs  T(C): 36.8 (18 Mar 2019 07:53), Max: 36.8 (18 Mar 2019 07:53)  T(F): 98.3 (18 Mar 2019 07:53), Max: 98.3 (18 Mar 2019 07:53)  HR: 79 (18 Mar 2019 07:53) (72 - 79)  BP: 113/66 (18 Mar 2019 07:53) (113/66 - 123/70)  BP(mean): --  RR: 18 (18 Mar 2019 07:53) (17 - 18)  SpO2: 97% (18 Mar 2019 07:53) (97% - 100%)    PHYSICAL EXAM:  GENERAL: NAD, well-groomed, well-developed  HEAD:  Atraumatic, Normocephalic  EYES: EOMI, PERRLA, conjunctiva and sclera clear  NECK: Supple, No JVD, Normal thyroid  CHEST/LUNG: Clear to percussion bilaterally; No rales, rhonchi, wheezing, or rubs  HEART: Regular rate and rhythm; No murmurs, rubs, or gallops  ABDOMEN: Soft, Nontender, Nondistended; Bowel sounds present  NERVOUS SYSTEM:  Alert & Oriented X3, Good concentration; Motor Strength 5/5 B/L   EXTREMITIES:  2+ Peripheral Pulses, No clubbing, cyanosis, or edema  SKIN;    LABS:                        10.1   11.26 )-----------( 473      ( 17 Mar 2019 06:35 )             32.7     03-18    133<L>  |  99  |  15  ----------------------------<  94  4.5   |  27  |  0.63    Ca    8.9      18 Mar 2019 07:51          CAPILLARY BLOOD GLUCOSE      POCT Blood Glucose.: 144 mg/dL (18 Mar 2019 11:48)  POCT Blood Glucose.: 139 mg/dL (18 Mar 2019 08:48)  POCT Blood Glucose.: 114 mg/dL (17 Mar 2019 21:26)  POCT Blood Glucose.: 105 mg/dL (17 Mar 2019 16:59)      RADIOLOGY & ADDITIONAL TESTS:    Imaging Personally Reviewed:  [ ] YES  [ ] NO    Consultant(s) Notes Reviewed:  [ ] YES  [ ] NO

## 2019-03-18 NOTE — CHART NOTE - NSCHARTNOTEFT_GEN_A_CORE
Pt noted with new onset atrial fib w/ RVR. Low dose beta blocker ordered with transfer to telemetry planned. Case was discussed with the cardiologist, Dr. Sims.  Met with son and pt at bedside after his new diagnosis of atrial fibrillation with RVR. Discussed recommendation for Telemetry to assist in obtaining the correct medication dose for rate control of atrial fibrillation and to monitor for/ prevent any cardiac decompensation/ CHF.  They both at this point are adamant that he not be transferred to another floor. Patient is currently AAOx3 and understands the ramifications of remaining on non monitored cardiac floor. Pt's son Omar is at bedside states he feels frustrated that he was not told the patient would require authorization from his insurance for discharge planning and that the NIDHI request was submitted today.  Earlier this am, I spoke with the patient's other son, Terell, and explained that NIDHI would occur as soon as possible (insurance approval, bed availability etc). It was explained to Mr. Nelson that the list of choices was received late Friday afternoon and today was in-fact the next business day for all these agencies. Patient informs me that he would like to go home. Pt's son Omar indicates that if he does not have placement w/in 24 hours they want to take him home with services. They may choose the option of additional support with private pay.

## 2019-03-18 NOTE — PROGRESS NOTE ADULT - ATTENDING COMMENTS
Patient seen/evaluated at bedside 3/18/2019. I agree with the resident progress note/outlined plan of care. My independent findings and conclusions are documented.    On physical exam w/ new irregular rhythm. Noted w/ new onset atrial fibrillation on EKG. No chest pain, sob, lightheadedness    84 y/o m w/ Parkinson's Disease admitted s/p fall, found to have  1. Bacteroides Fragilis Bacteremia- resolving; complete antibiotics course. ID followup ongoing  2. 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 atrial fibrillation  4. Sepsis present on admission - now resolved  6. Metabolic encephalopathy- resolved  7. Deconditioning- OOB to chair; PT as tolerated  8. Parkinson's disease- stable; continue [id number]. hypothyroidism- stable continue synthroid  9. T2DM- monitor f/s; continue insulin  10 Dysphagia- continue with dysphagia diet; maintain aspiration precautions.     new cardiology event w/ atrial tachycardia, started low dose metoprolol, monitor bp as tolerated, for telemetry  appreciate discussions with GI, heme onc, surgical oncology  plan to proceed with discharge planning, follow up with oncology with imatinib  currently on day 12 of zon   discharge planning in progress

## 2019-03-18 NOTE — PROGRESS NOTE ADULT - REASON FOR ADMISSION
weakness, lethargy
Stage IV cancer
weakness, lethargy
sepsis and metastatic disease
weakness, lethargy

## 2019-03-18 NOTE — PROGRESS NOTE ADULT - SUBJECTIVE AND OBJECTIVE BOX
Pt seen and examined at bedside appears in NAD. Pt tolerating diet, reports no BM or flatus however denies any abdominal pain. Colonoscopy biopsy was negative for malignancy likely sampling error. Plan to rebiopsy here or at [facility]. No other acute events.    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

## 2019-03-18 NOTE — CHART NOTE - NSCHARTNOTEFT_GEN_A_CORE
Now informed by the pt's son, Omar that he feels as though  I am a "used care salesman" who has been working towards keeping his father in the hospital...and as such he would like to discuss this with patient relations    will refer to patient relations/case mgt administration staff Now informed by the pt's son, Omar that he feels as though I am a "used " who has been working towards keeping his father in the hospital...and as such he would like to discuss this with patient relations    will refer to patient relations/case mgt administration staff

## 2019-03-18 NOTE — PROGRESS NOTE ADULT - ASSESSMENT
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 Blood culture grew gm -ve bacteria, pending final result.     Problem/Plan - 1:  ·  Problem: Bacteremia.  Plan: -  resolved.   p/w hypothermia and hypotension, s/p code sepsis  - leukocytosis trending up, afebrile overnight  - blood culture shows bacteroides fragilis likely due to colonic mass  - on zosyn D12  - UA and CXR, CT head negative  - repeat blood culture are negative.  - ID Dr. Artis.   will be discharged on oral antibiotics Ceftin and augmentin for a total antibiotic duration of 14 days per ID.       Problem/Plan - 2:  ·  Problem: Colonic mass.  Plan: - has palpable mass in Rt. lower abdomen, mildly tender, had bloody bowel movement before 3 nights, now no bowel complaints  - CT abdomen showed Multiple liver lesions consistent with metastases and large mass in the lower abdomen/upper pelvis surrounding the sigmoid   colon which may be secondary to just tumor, lymphoma or adenocarcinoma.   -  CT chest shows no mets with Bilateral pleural effusions and subsegmental atelectasis of basilar right lower lobe.  - occult negative  - normal CEA, CA 19-9,  - GI Dr. Murdock  - Hem-onc Dr. Camargo   - Palliative consulted- PT. DNR/DNI  Awaiting immunohistological screening results for liver biopsy is waited.  No need of sigmoid colon biopsy per hematologist and surgical oncologist.     Problem/Plan - 3:  ·  Problem: Hypothyroidism.  Plan: p/w hypothermia of  temp of 94.7 rectally and hypotension, likely 2/2 hypothyroid state precipitated by missed dose and malignancy vs sepsis  s/p Hydrocortisone 50 mg IV x 1 dose then IV synthroid 50 microgm, on presentation  c/w synthroid 100mcg daily PO, (home dose)  pt. missed dose for 4 days  TSH within normal limits  normal free T4.      Problem/Plan - 4:  ·  Problem: Anemia.  Plan: occult negative  iron profile shows anemia of chronic disease with elevated ferritin likely due to malignancy  transfuse if Hb <7.        Problem/Plan - 5:  ·  Problem: Parkinson's disease.  Plan: continue with sinemet if patient able to tolerate PO at home dose  sinemet 10/100 TID.      Problem/Plan - 6:  Problem: Hypertension. Plan: hold off antihypertensives for now   BP well controlled off meds  continue to monitor BP.     Problem/Plan - 7:  ·  Problem: CHF (congestive heart failure).  Plan: not in exacerbation  patient on atenolol at home, will hold for now in setting of hypotension  normal echo with GII DD.      Problem/Plan - 8:  ·  Problem: Sacral decubitus ulcer, stage II.  Plan: does not look infected, cover daily, clean with NS  wound care nurse on board      Problem/Plan - 9:  ·  Problem: Diabetes.  Plan: - no previous history of diabetes  - A1C 6.7  - on hss  - diabetic education.      Problem/Plan - 10:  Problem: Need for prophylactic measure. Plan; IMPROVE VTE Individual Risk Assessment          RISK                                                          Points  [  ] Previous VTE                                                3  [  ] Thrombophilia                                             2  [  ] Lower limb paralysis                                   2        (unable to hold up >15 seconds)    [  ] Current Cancer                                             2         (within 6 months)  [  x] Immobilization > 24 hrs                              1  [  ] ICU/CCU stay > 24 hours                             1  [ x ] Age > 60                                                         1    IMPROVE VTE Score: 2    lovenox for vte prophylaxis.

## 2019-03-18 NOTE — PROGRESS NOTE ADULT - PROVIDER SPECIALTY LIST ADULT
Gastroenterology
Heme/Onc
Heme/Onc
Infectious Disease
Infectious Disease
Internal Medicine
Surgery
Urology
Urology
Infectious Disease
Infectious Disease

## 2019-03-19 ENCOUNTER — TRANSCRIPTION ENCOUNTER (OUTPATIENT)
Age: 84
End: 2019-03-19

## 2019-03-19 VITALS
SYSTOLIC BLOOD PRESSURE: 119 MMHG | DIASTOLIC BLOOD PRESSURE: 68 MMHG | TEMPERATURE: 97 F | HEART RATE: 75 BPM | RESPIRATION RATE: 18 BRPM | OXYGEN SATURATION: 98 %

## 2019-03-19 DIAGNOSIS — C18.7 MALIGNANT NEOPLASM OF SIGMOID COLON: ICD-10-CM

## 2019-03-19 DIAGNOSIS — R62.7 ADULT FAILURE TO THRIVE: ICD-10-CM

## 2019-03-19 LAB
ANION GAP SERPL CALC-SCNC: 4 MMOL/L — LOW (ref 5–17)
BUN SERPL-MCNC: 19 MG/DL — HIGH (ref 7–18)
CALCIUM SERPL-MCNC: 8.6 MG/DL — SIGNIFICANT CHANGE UP (ref 8.4–10.5)
CHLORIDE SERPL-SCNC: 100 MMOL/L — SIGNIFICANT CHANGE UP (ref 96–108)
CO2 SERPL-SCNC: 28 MMOL/L — SIGNIFICANT CHANGE UP (ref 22–31)
CREAT SERPL-MCNC: 0.7 MG/DL — SIGNIFICANT CHANGE UP (ref 0.5–1.3)
GLUCOSE SERPL-MCNC: 108 MG/DL — HIGH (ref 70–99)
HCT VFR BLD CALC: 32 % — LOW (ref 39–50)
HGB BLD-MCNC: 9.9 G/DL — LOW (ref 13–17)
MCHC RBC-ENTMCNC: 24.4 PG — LOW (ref 27–34)
MCHC RBC-ENTMCNC: 30.9 GM/DL — LOW (ref 32–36)
MCV RBC AUTO: 78.8 FL — LOW (ref 80–100)
NRBC # BLD: 0 /100 WBCS — SIGNIFICANT CHANGE UP (ref 0–0)
PLATELET # BLD AUTO: 481 K/UL — HIGH (ref 150–400)
POTASSIUM SERPL-MCNC: 4.1 MMOL/L — SIGNIFICANT CHANGE UP (ref 3.5–5.3)
POTASSIUM SERPL-SCNC: 4.1 MMOL/L — SIGNIFICANT CHANGE UP (ref 3.5–5.3)
RBC # BLD: 4.06 M/UL — LOW (ref 4.2–5.8)
RBC # FLD: 15.9 % — HIGH (ref 10.3–14.5)
SODIUM SERPL-SCNC: 132 MMOL/L — LOW (ref 135–145)
WBC # BLD: 12.03 K/UL — HIGH (ref 3.8–10.5)
WBC # FLD AUTO: 12.03 K/UL — HIGH (ref 3.8–10.5)

## 2019-03-19 PROCEDURE — 80048 BASIC METABOLIC PNL TOTAL CA: CPT

## 2019-03-19 PROCEDURE — 83735 ASSAY OF MAGNESIUM: CPT

## 2019-03-19 PROCEDURE — 85610 PROTHROMBIN TIME: CPT

## 2019-03-19 PROCEDURE — 36415 COLL VENOUS BLD VENIPUNCTURE: CPT

## 2019-03-19 PROCEDURE — 84145 PROCALCITONIN (PCT): CPT

## 2019-03-19 PROCEDURE — 83550 IRON BINDING TEST: CPT

## 2019-03-19 PROCEDURE — 85027 COMPLETE CBC AUTOMATED: CPT

## 2019-03-19 PROCEDURE — 83615 LACTATE (LD) (LDH) ENZYME: CPT

## 2019-03-19 PROCEDURE — 74177 CT ABD & PELVIS W/CONTRAST: CPT

## 2019-03-19 PROCEDURE — 76705 ECHO EXAM OF ABDOMEN: CPT

## 2019-03-19 PROCEDURE — 99223 1ST HOSP IP/OBS HIGH 75: CPT

## 2019-03-19 PROCEDURE — 83036 HEMOGLOBIN GLYCOSYLATED A1C: CPT

## 2019-03-19 PROCEDURE — 93005 ELECTROCARDIOGRAM TRACING: CPT

## 2019-03-19 PROCEDURE — 82803 BLOOD GASES ANY COMBINATION: CPT

## 2019-03-19 PROCEDURE — 80061 LIPID PANEL: CPT

## 2019-03-19 PROCEDURE — 87150 DNA/RNA AMPLIFIED PROBE: CPT

## 2019-03-19 PROCEDURE — 82306 VITAMIN D 25 HYDROXY: CPT

## 2019-03-19 PROCEDURE — 97530 THERAPEUTIC ACTIVITIES: CPT

## 2019-03-19 PROCEDURE — 70450 CT HEAD/BRAIN W/O DYE: CPT

## 2019-03-19 PROCEDURE — 88305 TISSUE EXAM BY PATHOLOGIST: CPT

## 2019-03-19 PROCEDURE — 83930 ASSAY OF BLOOD OSMOLALITY: CPT

## 2019-03-19 PROCEDURE — 81001 URINALYSIS AUTO W/SCOPE: CPT

## 2019-03-19 PROCEDURE — 85045 AUTOMATED RETICULOCYTE COUNT: CPT

## 2019-03-19 PROCEDURE — 84300 ASSAY OF URINE SODIUM: CPT

## 2019-03-19 PROCEDURE — 76942 ECHO GUIDE FOR BIOPSY: CPT

## 2019-03-19 PROCEDURE — 47000 NEEDLE BIOPSY OF LIVER PERQ: CPT

## 2019-03-19 PROCEDURE — 82272 OCCULT BLD FECES 1-3 TESTS: CPT

## 2019-03-19 PROCEDURE — 93306 TTE W/DOPPLER COMPLETE: CPT

## 2019-03-19 PROCEDURE — 87086 URINE CULTURE/COLONY COUNT: CPT

## 2019-03-19 PROCEDURE — 82728 ASSAY OF FERRITIN: CPT

## 2019-03-19 PROCEDURE — 83605 ASSAY OF LACTIC ACID: CPT

## 2019-03-19 PROCEDURE — 84466 ASSAY OF TRANSFERRIN: CPT

## 2019-03-19 PROCEDURE — 86301 IMMUNOASSAY TUMOR CA 19-9: CPT

## 2019-03-19 PROCEDURE — 88307 TISSUE EXAM BY PATHOLOGIST: CPT

## 2019-03-19 PROCEDURE — 80053 COMPREHEN METABOLIC PANEL: CPT

## 2019-03-19 PROCEDURE — 99285 EMERGENCY DEPT VISIT HI MDM: CPT | Mod: 25

## 2019-03-19 PROCEDURE — 83010 ASSAY OF HAPTOGLOBIN QUANT: CPT

## 2019-03-19 PROCEDURE — 82550 ASSAY OF CK (CPK): CPT

## 2019-03-19 PROCEDURE — 84443 ASSAY THYROID STIM HORMONE: CPT

## 2019-03-19 PROCEDURE — 84560 ASSAY OF URINE/URIC ACID: CPT

## 2019-03-19 PROCEDURE — 82962 GLUCOSE BLOOD TEST: CPT

## 2019-03-19 PROCEDURE — 71250 CT THORAX DX C-: CPT

## 2019-03-19 PROCEDURE — 71045 X-RAY EXAM CHEST 1 VIEW: CPT

## 2019-03-19 PROCEDURE — 83880 ASSAY OF NATRIURETIC PEPTIDE: CPT

## 2019-03-19 PROCEDURE — 83540 ASSAY OF IRON: CPT

## 2019-03-19 PROCEDURE — 82378 CARCINOEMBRYONIC ANTIGEN: CPT

## 2019-03-19 PROCEDURE — 97162 PT EVAL MOD COMPLEX 30 MIN: CPT

## 2019-03-19 PROCEDURE — 83690 ASSAY OF LIPASE: CPT

## 2019-03-19 PROCEDURE — 87040 BLOOD CULTURE FOR BACTERIA: CPT

## 2019-03-19 PROCEDURE — 84100 ASSAY OF PHOSPHORUS: CPT

## 2019-03-19 PROCEDURE — 84439 ASSAY OF FREE THYROXINE: CPT

## 2019-03-19 PROCEDURE — 97110 THERAPEUTIC EXERCISES: CPT

## 2019-03-19 PROCEDURE — 82607 VITAMIN B-12: CPT

## 2019-03-19 PROCEDURE — 82533 TOTAL CORTISOL: CPT

## 2019-03-19 PROCEDURE — 99239 HOSP IP/OBS DSCHRG MGMT >30: CPT

## 2019-03-19 PROCEDURE — 84484 ASSAY OF TROPONIN QUANT: CPT

## 2019-03-19 RX ORDER — METRONIDAZOLE 500 MG
1 TABLET ORAL
Qty: 6 | Refills: 0 | OUTPATIENT
Start: 2019-03-19 | End: 2019-03-20

## 2019-03-19 RX ORDER — NYSTATIN CREAM 100000 [USP'U]/G
1 CREAM TOPICAL THREE TIMES A DAY
Qty: 0 | Refills: 0 | Status: DISCONTINUED | OUTPATIENT
Start: 2019-03-19 | End: 2019-03-19

## 2019-03-19 RX ORDER — CEFUROXIME AXETIL 250 MG
1 TABLET ORAL
Qty: 4 | Refills: 0 | OUTPATIENT
Start: 2019-03-19 | End: 2019-03-20

## 2019-03-19 RX ORDER — ENOXAPARIN SODIUM 100 MG/ML
40 INJECTION SUBCUTANEOUS DAILY
Qty: 0 | Refills: 0 | Status: DISCONTINUED | OUTPATIENT
Start: 2019-03-19 | End: 2019-03-19

## 2019-03-19 RX ORDER — SODIUM CHLORIDE 9 MG/ML
1000 INJECTION INTRAMUSCULAR; INTRAVENOUS; SUBCUTANEOUS
Qty: 0 | Refills: 0 | Status: DISCONTINUED | OUTPATIENT
Start: 2019-03-19 | End: 2019-03-19

## 2019-03-19 RX ORDER — ATENOLOL 25 MG/1
1 TABLET ORAL
Qty: 0 | Refills: 0 | COMMUNITY

## 2019-03-19 RX ORDER — METOPROLOL TARTRATE 50 MG
1 TABLET ORAL
Qty: 30 | Refills: 0 | OUTPATIENT
Start: 2019-03-19 | End: 2019-04-17

## 2019-03-19 RX ORDER — LOSARTAN POTASSIUM 100 MG/1
1 TABLET, FILM COATED ORAL
Qty: 0 | Refills: 0 | COMMUNITY

## 2019-03-19 RX ADMIN — ENOXAPARIN SODIUM 40 MILLIGRAM(S): 100 INJECTION SUBCUTANEOUS at 11:34

## 2019-03-19 RX ADMIN — Medication 12.5 MILLIGRAM(S): at 06:25

## 2019-03-19 RX ADMIN — Medication 1 DROP(S): at 14:36

## 2019-03-19 RX ADMIN — CARBIDOPA AND LEVODOPA 1 TABLET(S): 25; 100 TABLET ORAL at 11:34

## 2019-03-19 RX ADMIN — Medication 1 APPLICATION(S): at 06:25

## 2019-03-19 RX ADMIN — Medication 1 DROP(S): at 06:25

## 2019-03-19 RX ADMIN — PIPERACILLIN AND TAZOBACTAM 25 GRAM(S): 4; .5 INJECTION, POWDER, LYOPHILIZED, FOR SOLUTION INTRAVENOUS at 06:32

## 2019-03-19 RX ADMIN — NYSTATIN CREAM 1 APPLICATION(S): 100000 CREAM TOPICAL at 14:40

## 2019-03-19 RX ADMIN — Medication 1000 UNIT(S): at 11:34

## 2019-03-19 RX ADMIN — Medication 100 MICROGRAM(S): at 06:48

## 2019-03-19 RX ADMIN — SODIUM CHLORIDE 60 MILLILITER(S): 9 INJECTION INTRAMUSCULAR; INTRAVENOUS; SUBCUTANEOUS at 11:34

## 2019-03-19 RX ADMIN — CARBIDOPA AND LEVODOPA 1 TABLET(S): 25; 100 TABLET ORAL at 06:25

## 2019-03-19 RX ADMIN — APIXABAN 5 MILLIGRAM(S): 2.5 TABLET, FILM COATED ORAL at 06:25

## 2019-03-19 RX ADMIN — PIPERACILLIN AND TAZOBACTAM 25 GRAM(S): 4; .5 INJECTION, POWDER, LYOPHILIZED, FOR SOLUTION INTRAVENOUS at 14:36

## 2019-03-19 RX ADMIN — Medication 1: at 11:45

## 2019-03-19 NOTE — CONSULT NOTE ADULT - SUBJECTIVE AND OBJECTIVE BOX
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 Patient also developed atrial fibrillation, for which cardiology service was called. Patient    PAST MEDICAL & SURGICAL HISTORY:  As above, also Hypothyroidism, Sacral decubitus ulcer, stage II, H/O hernia repair    Allergies    No Known Allergies    MEDICATIONS  (STANDING):  apixaban 5 milliGRAM(s) Oral every 12 hours  artificial  tears Solution 1 Drop(s) Left EYE three times a day  carbidopa/levodopa  10/100 1 Tablet(s) Oral <User Schedule>  cholecalciferol 1000 Unit(s) Oral daily  erythromycin   Ointment 1 Application(s) Right EYE two times a day  insulin lispro (HumaLOG) corrective regimen sliding scale   SubCutaneous three times a day before meals  levothyroxine 100 MICROGram(s) Oral daily  metoprolol tartrate 12.5 milliGRAM(s) Oral two times a day  nystatin Powder 1 Application(s) Topical three times a day  piperacillin/tazobactam IVPB. 3.375 Gram(s) IV Intermittent every 8 hours  sodium chloride 0.9%. 1000 milliLiter(s) (60 mL/Hr) IV Continuous <Continuous>  sodium chloride 0.9%. 1000 milliLiter(s) (75 mL/Hr) IV Continuous <Continuous>  tamsulosin 0.4 milliGRAM(s) Oral at bedtime    MEDICATIONS  (PRN):  acetaminophen   Tablet .. 650 milliGRAM(s) Oral every 6 hours PRN Temp greater or equal to 38C (100.4F)  metoprolol tartrate Injectable 2.5 milliGRAM(s) IV Push every 6 hours PRN HR>150, hold for sbp<110      FAMILY HISTORY:  No pertinent family history in first degree relatives    No family history of premature coronary artery disease or sudden cardiac death    SOCIAL HISTORY:  Smoking-  Alcohol-  Illicit Drug use-    REVIEW OF SYSTEMS:  Constitutional: [ ] fever, [ ]weight loss,  [ ]fatigue  Eyes: [ ] visual changes  Respiratory: [ ]shortness of breath;  [ ] cough, [ ]wheezing, [ ]chills, [ ]hemoptysis  Cardiovascular: [ ] chest pain, [ ]palpitations, [ ]dizziness,  [ ]leg swelling [ ]syncope  Gastrointestinal: [ ] abdominal pain, [ ]nausea, [ ]vomiting,  [ ]diarrhea   Genitourinary: [ ] dysuria, [ ] hematuria  Neurologic: [ ] headaches [ ] tremors  [ ] weakness [ ] lightheadedness  Skin: [ ] itching, [ ]burning, [ ] rashes  Endocrine: [ ] heat or cold intolerance  Musculoskeletal: [ ] joint pain or swelling; [ ] muscle, back, or extremity pain  Psychiatric: [ ] depression, [ ]anxiety, [ ]mood swings, or [ ]difficulty sleeping  Hematologic: [ ] easy bruising, [ ] bleeding gums       [ x] All others negative	  [ ] Unable to obtain    Vital Signs Last 24 Hrs  T(C): 36.6 (19 Mar 2019 08:06), Max: 37 (19 Mar 2019 00:01)  T(F): 97.8 (19 Mar 2019 08:06), Max: 98.6 (19 Mar 2019 00:01)  HR: 71 (19 Mar 2019 08:06) (71 - 88)  BP: 120/53 (19 Mar 2019 08:06) (99/62 - 135/70)  BP(mean): --  RR: 18 (19 Mar 2019 08:06) (18 - 18)  SpO2: 96% (19 Mar 2019 08:06) (94% - 99%)  I&O's Summary      PHYSICAL EXAM:  General: No acute distress  HEENT: EOMI, PERRL  Neck: Supple, No JVD  Lungs: Clear to auscultation bilaterally; No rales or wheezing  Heart: Regular rate and rhythm; No murmurs, rubs, or gallops  Abdomen: Nontender, bowel sounds present  Extremities: No clubbing, cyanosis, or edema  Nervous system:  Alert & Oriented X3, no focal deficits  Psychiatric: Normal affect  Skin: No rashes or lesions      LABS:  03-19    132<L>  |  100  |  19<H>  ----------------------------<  108<H>  4.1   |  28  |  0.70    Ca    8.6      19 Mar 2019 06:34      Creatinine Trend: 0.70<--, 0.63<--, 0.64<--, 0.63<--, 0.70<--, 0.56<--                        9.9    12.03 )-----------( 481      ( 19 Mar 2019 06:34 )             32.0   03-07 [id number] 6.7    TFTs WNL    RADIOLOGY: < from: Xray Chest 1 View- PORTABLE-Urgent (03.06.19 @ 14:52) >  IMPRESSION: No evidence for focal infiltrate or lobar consolidation.   Interstitial prominence is identified bilaterally, likely related to   shallow inspiration.    < end of copied text >    < from: CT Chest No Cont (03.08.19 @ 11:27) >  IMPRESSION:     1.  No definite evidence of metastatic disease.    2.  Subcentimeter mediastinal and supraclavicular lymph nodes. These can   be further evaluated with PET/CT or follow-up chest CT in 3 months.    3.  Bilateral pleural effusions and subsegmental atelectasis of basilar   right lower lobe.    < end of copied text >    < from: CT Abdomen and Pelvis w/ IV Cont (03.06.19 @ 16:07) >  IMPRESSION:    Multiple liver lesions consistent with metastases    large mass in the lower abdomen/upper pelvis surrounding the sigmoid   colon which may be secondary to just tumor, lymphoma or adenocarcinoma.   There is no bowel obstruction.    < end of copied text >      ECG [my interpretation]:    TELEMETRY: n/a    ECHO: < from: Transthoracic Echocardiogram (03.07.19 @ 07:04) >  CONCLUSIONS:  1. Moderate posterior mitral annular calcification. Mild  mitral regurgitation.  2. Calcified trileaflet aortic valve with normal opening.  No aortic stenosis. Trace aortic regurgitation.  3. Normal aortic root.  4. Mildly dilated left atrium.  LA volume index = 38 cc/m2.  5. Normal left ventricular internal dimensions and wall  thicknesses.  6. Endocardium not well visualized; grossly normal left  ventricular systolic function.  7. Grade II diastolic dysfunction.  8. Right ventricle not well visualized. Normal RV systolic  function (TAPSE 2.4 cm).  9. RV systolic pressure is normal at  27 mm Hg.  10. Normal tricuspid valve. Moderate tricuspid  regurgitation.  11. Pulmonic valve not well seen. Trace pulmonic  insufficiency is noted.  12. No pericardial effusion.    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 Patient also developed atrial fibrillation, for which cardiology service was called. Patient    PAST MEDICAL & SURGICAL HISTORY:  As above, also Hypothyroidism, Sacral decubitus ulcer, stage II, H/O hernia repair    Allergies    No Known Allergies    MEDICATIONS  (STANDING):  apixaban 5 milliGRAM(s) Oral every 12 hours  artificial  tears Solution 1 Drop(s) Left EYE three times a day  carbidopa/levodopa  10/100 1 Tablet(s) Oral <User Schedule>  cholecalciferol 1000 Unit(s) Oral daily  erythromycin   Ointment 1 Application(s) Right EYE two times a day  insulin lispro (HumaLOG) corrective regimen sliding scale   SubCutaneous three times a day before meals  levothyroxine 100 MICROGram(s) Oral daily  metoprolol tartrate 12.5 milliGRAM(s) Oral two times a day  nystatin Powder 1 Application(s) Topical three times a day  piperacillin/tazobactam IVPB. 3.375 Gram(s) IV Intermittent every 8 hours  sodium chloride 0.9%. 1000 milliLiter(s) (60 mL/Hr) IV Continuous <Continuous>  sodium chloride 0.9%. 1000 milliLiter(s) (75 mL/Hr) IV Continuous <Continuous>  tamsulosin 0.4 milliGRAM(s) Oral at bedtime    MEDICATIONS  (PRN):  acetaminophen   Tablet .. 650 milliGRAM(s) Oral every 6 hours PRN Temp greater or equal to 38C (100.4F)  metoprolol tartrate Injectable 2.5 milliGRAM(s) IV Push every 6 hours PRN HR>150, hold for sbp<110      FAMILY HISTORY:  No pertinent family history in first degree relatives    No family history of premature coronary artery disease or sudden cardiac death    SOCIAL HISTORY:  Smoking-  Alcohol-  Illicit Drug use-    REVIEW OF SYSTEMS:  Constitutional: [ ] fever, [ ]weight loss,  [ ]fatigue  Eyes: [ ] visual changes  Respiratory: [ ]shortness of breath;  [ ] cough, [ ]wheezing, [ ]chills, [ ]hemoptysis  Cardiovascular: [ ] chest pain, [ ]palpitations, [ ]dizziness,  [ ]leg swelling [ ]syncope  Gastrointestinal: [ ] abdominal pain, [ ]nausea, [ ]vomiting,  [ ]diarrhea   Genitourinary: [ ] dysuria, [ ] hematuria  Neurologic: [ ] headaches [ ] tremors  [ ] weakness [ ] lightheadedness  Skin: [ ] itching, [ ]burning, [ ] rashes  Endocrine: [ ] heat or cold intolerance  Musculoskeletal: [ ] joint pain or swelling; [ ] muscle, back, or extremity pain  Psychiatric: [ ] depression, [ ]anxiety, [ ]mood swings, or [ ]difficulty sleeping  Hematologic: [ ] easy bruising, [ ] bleeding gums       [ x] All others negative	  [ ] Unable to obtain    Vital Signs Last 24 Hrs  T(C): 36.6 (19 Mar 2019 08:06), Max: 37 (19 Mar 2019 00:01)  T(F): 97.8 (19 Mar 2019 08:06), Max: 98.6 (19 Mar 2019 00:01)  HR: 71 (19 Mar 2019 08:06) (71 - 88)  BP: 120/53 (19 Mar 2019 08:06) (99/62 - 135/70)  BP(mean): --  RR: 18 (19 Mar 2019 08:06) (18 - 18)  SpO2: 96% (19 Mar 2019 08:06) (94% - 99%)  I&O's Summary      PHYSICAL EXAM:  General: No acute distress  HEENT: EOMI, PERRL  Neck: Supple, No JVD  Lungs: Clear to auscultation bilaterally; No rales or wheezing  Heart: Regular rate and rhythm; No murmurs, rubs, or gallops  Abdomen: Nontender, bowel sounds present  Extremities: No clubbing, cyanosis, or edema  Nervous system:  Alert & Oriented X3, no focal deficits  Psychiatric: Normal affect  Skin: No rashes or lesions      LABS:  03-19    132<L>  |  100  |  19<H>  ----------------------------<  108<H>  4.1   |  28  |  0.70    Ca    8.6      19 Mar 2019 06:34      Creatinine Trend: 0.70<--, 0.63<--, 0.64<--, 0.63<--, 0.70<--, 0.56<--                        9.9    12.03 )-----------( 481      ( 19 Mar 2019 06:34 )             32.0   03-07 [id number] 6.7    TFTs WNL    RADIOLOGY: < from: Xray Chest 1 View- PORTABLE-Urgent (03.06.19 @ 14:52) >  IMPRESSION: No evidence for focal infiltrate or lobar consolidation.   Interstitial prominence is identified bilaterally, likely related to   shallow inspiration.    < end of copied text >    < from: CT Chest No Cont (03.08.19 @ 11:27) >  IMPRESSION:     1.  No definite evidence of metastatic disease.    2.  Subcentimeter mediastinal and supraclavicular lymph nodes. These can   be further evaluated with PET/CT or follow-up chest CT in 3 months.    3.  Bilateral pleural effusions and subsegmental atelectasis of basilar   right lower lobe.    < end of copied text >    < from: CT Abdomen and Pelvis w/ IV Cont (03.06.19 @ 16:07) >  IMPRESSION:    Multiple liver lesions consistent with metastases    large mass in the lower abdomen/upper pelvis surrounding the sigmoid   colon which may be secondary to just tumor, lymphoma or adenocarcinoma.   There is no bowel obstruction.    < end of copied text >      ECG [my interpretation]: 3/18/2019 @ 18:54: Sinus rhythm with RBBB, with transition to tachycarrhtyhmia (AF vs AT)    TELEMETRY: n/a    ECHO: < from: Transthoracic Echocardiogram (03.07.19 @ 07:04) >  CONCLUSIONS:  1. Moderate posterior mitral annular calcification. Mild  mitral regurgitation.  2. Calcified trileaflet aortic valve with normal opening.  No aortic stenosis. Trace aortic regurgitation.  3. Normal aortic root.  4. Mildly dilated left atrium.  LA volume index = 38 cc/m2.  5. Normal left ventricular internal dimensions and wall  thicknesses.  6. Endocardium not well visualized; grossly normal left  ventricular systolic function.  7. Grade II diastolic dysfunction.  8. Right ventricle not well visualized. Normal RV systolic  function (TAPSE 2.4 cm).  9. RV systolic pressure is normal at  27 mm Hg.  10. Normal tricuspid valve. Moderate tricuspid  regurgitation.  11. Pulmonic valve not well seen. Trace pulmonic  insufficiency is noted.  12. No pericardial effusion.    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 Patient also developed atrial fibrillation, for which cardiology service was called. Patient    PAST MEDICAL & SURGICAL HISTORY:  As above, also Hypothyroidism, Sacral decubitus ulcer, stage II, H/O hernia repair    Allergies    No Known Allergies    MEDICATIONS  (STANDING):  apixaban 5 milliGRAM(s) Oral every 12 hours  artificial  tears Solution 1 Drop(s) Left EYE three times a day  carbidopa/levodopa  10/100 1 Tablet(s) Oral <User Schedule>  cholecalciferol 1000 Unit(s) Oral daily  erythromycin   Ointment 1 Application(s) Right EYE two times a day  insulin lispro (HumaLOG) corrective regimen sliding scale   SubCutaneous three times a day before meals  levothyroxine 100 MICROGram(s) Oral daily  metoprolol tartrate 12.5 milliGRAM(s) Oral two times a day  nystatin Powder 1 Application(s) Topical three times a day  piperacillin/tazobactam IVPB. 3.375 Gram(s) IV Intermittent every 8 hours  sodium chloride 0.9%. 1000 milliLiter(s) (60 mL/Hr) IV Continuous <Continuous>  sodium chloride 0.9%. 1000 milliLiter(s) (75 mL/Hr) IV Continuous <Continuous>  tamsulosin 0.4 milliGRAM(s) Oral at bedtime    MEDICATIONS  (PRN):  acetaminophen   Tablet .. 650 milliGRAM(s) Oral every 6 hours PRN Temp greater or equal to 38C (100.4F)  metoprolol tartrate Injectable 2.5 milliGRAM(s) IV Push every 6 hours PRN HR>150, hold for sbp<110      FAMILY HISTORY:  No pertinent family history in first degree relatives    No family history of premature coronary artery disease or sudden cardiac death    SOCIAL HISTORY:  Smoking-  Alcohol-  Illicit Drug use-    REVIEW OF SYSTEMS:  Constitutional: [ ] fever, [ ]weight loss,  [ ]fatigue  Eyes: [ ] visual changes  Respiratory: [ ]shortness of breath;  [ ] cough, [ ]wheezing, [ ]chills, [ ]hemoptysis  Cardiovascular: [ ] chest pain, [ ]palpitations, [ ]dizziness,  [ ]leg swelling [ ]syncope  Gastrointestinal: [ ] abdominal pain, [ ]nausea, [ ]vomiting,  [ ]diarrhea   Genitourinary: [ ] dysuria, [ ] hematuria  Neurologic: [ ] headaches [ ] tremors  [ ] weakness [ ] lightheadedness  Skin: [ ] itching, [ ]burning, [ ] rashes  Endocrine: [ ] heat or cold intolerance  Musculoskeletal: [ ] joint pain or swelling; [ ] muscle, back, or extremity pain  Psychiatric: [ ] depression, [ ]anxiety, [ ]mood swings, or [ ]difficulty sleeping  Hematologic: [ ] easy bruising, [ ] bleeding gums       [ x] All others negative	  [ ] Unable to obtain    Vital Signs Last 24 Hrs  T(C): 36.6 (19 Mar 2019 08:06), Max: 37 (19 Mar 2019 00:01)  T(F): 97.8 (19 Mar 2019 08:06), Max: 98.6 (19 Mar 2019 00:01)  HR: 71 (19 Mar 2019 08:06) (71 - 88)  BP: 120/53 (19 Mar 2019 08:06) (99/62 - 135/70)  BP(mean): --  RR: 18 (19 Mar 2019 08:06) (18 - 18)  SpO2: 96% (19 Mar 2019 08:06) (94% - 99%)  I&O's Summary      PHYSICAL EXAM:  General: No acute distress  HEENT: EOMI, PERRL  Neck: Supple, No JVD  Lungs: Clear to auscultation bilaterally; No rales or wheezing  Heart: Regular rate and rhythm; No murmurs, rubs, or gallops  Abdomen: Nontender, bowel sounds present  Extremities: No clubbing, cyanosis, or edema  Nervous system:  Alert & Oriented X3, no focal deficits  Psychiatric: Normal affect  Skin: No rashes or lesions      LABS:  03-19    132<L>  |  100  |  19<H>  ----------------------------<  108<H>  4.1   |  28  |  0.70    Ca    8.6      19 Mar 2019 06:34      Creatinine Trend: 0.70<--, 0.63<--, 0.64<--, 0.63<--, 0.70<--, 0.56<--                        9.9    12.03 )-----------( 481      ( 19 Mar 2019 06:34 )             32.0   03-07 [id number] 6.7    TFTs WNL    RADIOLOGY: < from: Xray Chest 1 View- PORTABLE-Urgent (03.06.19 @ 14:52) >  IMPRESSION: No evidence for focal infiltrate or lobar consolidation.   Interstitial prominence is identified bilaterally, likely related to   shallow inspiration.    < end of copied text >    < from: CT Chest No Cont (03.08.19 @ 11:27) >  IMPRESSION:     1.  No definite evidence of metastatic disease.    2.  Subcentimeter mediastinal and supraclavicular lymph nodes. These can   be further evaluated with PET/CT or follow-up chest CT in 3 months.    3.  Bilateral pleural effusions and subsegmental atelectasis of basilar   right lower lobe.    < end of copied text >    < from: CT Abdomen and Pelvis w/ IV Cont (03.06.19 @ 16:07) >  IMPRESSION:    Multiple liver lesions consistent with metastases    large mass in the lower abdomen/upper pelvis surrounding the sigmoid   colon which may be secondary to just tumor, lymphoma or adenocarcinoma.   There is no bowel obstruction.    < end of copied text >      ECG [my interpretation]: 3/18/2019 @ 18:54: Sinus rhythm with RBBB, with transition to tachycarrhtyhmia (AT)    TELEMETRY: n/a    ECHO: < from: Transthoracic Echocardiogram (03.07.19 @ 07:04) >  CONCLUSIONS:  1. Moderate posterior mitral annular calcification. Mild  mitral regurgitation.  2. Calcified trileaflet aortic valve with normal opening.  No aortic stenosis. Trace aortic regurgitation.  3. Normal aortic root.  4. Mildly dilated left atrium.  LA volume index = 38 cc/m2.  5. Normal left ventricular internal dimensions and wall  thicknesses.  6. Endocardium not well visualized; grossly normal left  ventricular systolic function.  7. Grade II diastolic dysfunction.  8. Right ventricle not well visualized. Normal RV systolic  function (TAPSE 2.4 cm).  9. RV systolic pressure is normal at  27 mm Hg.  10. Normal tricuspid valve. Moderate tricuspid  regurgitation.  11. Pulmonic valve not well seen. Trace pulmonic  insufficiency is noted.  12. No pericardial effusion.    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 Patient also developed irregular heart rate, for which cardiology service was called. Patient denies feeling any chest pain, dyspnea, palpitations, lightheadedness, or syncope.  Has not had palpitations in the past. Reports his HTN, HLD, and HFpEF are all well controlled when at home.     PAST MEDICAL & SURGICAL HISTORY:  As above, also Hypothyroidism, Sacral decubitus ulcer, stage II, H/O hernia repair    Allergies    No Known Allergies    MEDICATIONS  (STANDING):  apixaban 5 milliGRAM(s) Oral every 12 hours  artificial  tears Solution 1 Drop(s) Left EYE three times a day  carbidopa/levodopa  10/100 1 Tablet(s) Oral <User Schedule>  cholecalciferol 1000 Unit(s) Oral daily  erythromycin   Ointment 1 Application(s) Right EYE two times a day  insulin lispro (HumaLOG) corrective regimen sliding scale   SubCutaneous three times a day before meals  levothyroxine 100 MICROGram(s) Oral daily  metoprolol tartrate 12.5 milliGRAM(s) Oral two times a day  nystatin Powder 1 Application(s) Topical three times a day  piperacillin/tazobactam IVPB. 3.375 Gram(s) IV Intermittent every 8 hours  sodium chloride 0.9%. 1000 milliLiter(s) (60 mL/Hr) IV Continuous <Continuous>  sodium chloride 0.9%. 1000 milliLiter(s) (75 mL/Hr) IV Continuous <Continuous>  tamsulosin 0.4 milliGRAM(s) Oral at bedtime    MEDICATIONS  (PRN):  acetaminophen   Tablet .. 650 milliGRAM(s) Oral every 6 hours PRN Temp greater or equal to 38C (100.4F)  metoprolol tartrate Injectable 2.5 milliGRAM(s) IV Push every 6 hours PRN HR>150, hold for sbp<110      FAMILY HISTORY:  No family history of premature coronary artery disease or sudden cardiac death    SOCIAL HISTORY:  Smoking-Denies  Alcohol-Denies  Illicit Drug use-Denies    REVIEW OF SYSTEMS:  Constitutional: [ ] fever, [ ]weight loss,  [x ]fatigue  Eyes: [ ] visual changes  Respiratory: [ ]shortness of breath;  [ ] cough, [ ]wheezing, [ ]chills, [ ]hemoptysis  Cardiovascular: [ ] chest pain, [ ]palpitations, [ ]dizziness,  [ ]leg swelling [ ]syncope  Gastrointestinal: [ ] abdominal pain, [ ]nausea, [ ]vomiting,  [ ]diarrhea   Genitourinary: [ ] dysuria, [ ] hematuria  Neurologic: [ ] headaches [ ] tremors  [ ] weakness [ ] lightheadedness  Skin: [ ] itching, [ ]burning, [ ] rashes  Endocrine: [ ] heat or cold intolerance  Musculoskeletal: [ ] joint pain or swelling; [ ] muscle, back, or extremity pain  Psychiatric: [ ] depression, [ ]anxiety, [ ]mood swings, or [ ]difficulty sleeping  Hematologic: [ ] easy bruising, [ ] bleeding gums       [ x] All others negative	  [ ] Unable to obtain    Vital Signs Last 24 Hrs  T(C): 36.6 (19 Mar 2019 08:06), Max: 37 (19 Mar 2019 00:01)  T(F): 97.8 (19 Mar 2019 08:06), Max: 98.6 (19 Mar 2019 00:01)  HR: 71 (19 Mar 2019 08:06) (71 - 88)  BP: 120/53 (19 Mar 2019 08:06) (99/62 - 135/70)  BP(mean): --  RR: 18 (19 Mar 2019 08:06) (18 - 18)  SpO2: 96% (19 Mar 2019 08:06) (94% - 99%)  I&O's Summary      PHYSICAL EXAM:  General: No acute distress  HEENT: EOMI, PERRL  Neck: Supple, No JVD  Lungs: Clear to auscultation bilaterally; No rales or wheezing  Heart: Regular rate and rhythm; No murmurs, rubs, or gallops  Abdomen: Nontender, bowel sounds present  Extremities: No clubbing, cyanosis, or edema  Nervous system:  Alert & Oriented X3, no focal deficits  Psychiatric: Normal affect  Skin: No rashes or lesions    LABS:  03-19    132<L>  |  100  |  19<H>  ----------------------------<  108<H>  4.1   |  28  |  0.70    Ca    8.6      19 Mar 2019 06:34      Creatinine Trend: 0.70<--, 0.63<--, 0.64<--, 0.63<--, 0.70<--, 0.56<--                        9.9    12.03 )-----------( 481      ( 19 Mar 2019 06:34 )             32.0   03-07 [id number] 6.7    TFTs WNL    RADIOLOGY: < from: Xray Chest 1 View- PORTABLE-Urgent (03.06.19 @ 14:52) >  IMPRESSION: No evidence for focal infiltrate or lobar consolidation.   Interstitial prominence is identified bilaterally, likely related to   shallow inspiration.    < end of copied text >    < from: CT Chest No Cont (03.08.19 @ 11:27) >  IMPRESSION:     1.  No definite evidence of metastatic disease.    2.  Subcentimeter mediastinal and supraclavicular lymph nodes. These can   be further evaluated with PET/CT or follow-up chest CT in 3 months.    3.  Bilateral pleural effusions and subsegmental atelectasis of basilar   right lower lobe.    < end of copied text >    < from: CT Abdomen and Pelvis w/ IV Cont (03.06.19 @ 16:07) >  IMPRESSION:    Multiple liver lesions consistent with metastases    large mass in the lower abdomen/upper pelvis surrounding the sigmoid   colon which may be secondary to just tumor, lymphoma or adenocarcinoma.   There is no bowel obstruction.    < end of copied text >      ECG [my interpretation]: 3/18/2019 @ 18:54: Sinus rhythm with RBBB, with transition to tachyarrhythmia (Atrial Tachycardia)    TELEMETRY: n/a    ECHO: < from: Transthoracic Echocardiogram (03.07.19 @ 07:04) >  CONCLUSIONS:  1. Moderate posterior mitral annular calcification. Mild  mitral regurgitation.  2. Calcified trileaflet aortic valve with normal opening.  No aortic stenosis. Trace aortic regurgitation.  3. Normal aortic root.  4. Mildly dilated left atrium.  LA volume index = 38 cc/m2.  5. Normal left ventricular internal dimensions and wall  thicknesses.  6. Endocardium not well visualized; grossly normal left  ventricular systolic function.  7. Grade II diastolic dysfunction.  8. Right ventricle not well visualized. Normal RV systolic  function (TAPSE 2.4 cm).  9. RV systolic pressure is normal at  27 mm Hg.  10. Normal tricuspid valve. Moderate tricuspid  regurgitation.  11. Pulmonic valve not well seen. Trace pulmonic  insufficiency is noted.  12. No pericardial effusion.    < end of copied text >

## 2019-03-19 NOTE — DISCHARGE NOTE PROVIDER - CARE PROVIDER_API CALL
Flako Camargo (MD)  Hematology; Medical Oncology  [address] [facility], [address]  [place] [address]  Phone: [phone]  Fax: [phone]  Follow Up Time:

## 2019-03-19 NOTE — CONSULT NOTE ADULT - ATTENDING COMMENTS
- I have seen and examined the patient on rounds. Patient's chart, labs, images and reports reviewed.  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
Thank you for the courtesy of a consultation, please contact me for any additional questions

## 2019-03-19 NOTE — DISCHARGE NOTE NURSING/CASE MANAGEMENT/SOCIAL WORK - NSDCPEPT PROEDHF_GEN_ALL_CORE
Activities as tolerated/Low salt diet/Monitor weight daily/Report signs and symptoms to primary care provider/Call primary care provider for follow up after discharge

## 2019-03-19 NOTE — DISCHARGE NOTE NURSING/CASE MANAGEMENT/SOCIAL WORK - NSDCDPATPORTLINK_GEN_ALL_CORE
You can access the [facility] Patient Portal, offered by [facility], by registering with the following website: [email or web address]

## 2019-03-19 NOTE — DISCHARGE NOTE PROVIDER - NSDCCPCAREPLAN_GEN_ALL_CORE_FT
PRINCIPAL DISCHARGE DIAGNOSIS  Diagnosis: Malignant neoplasm of sigmoid colon  Assessment and Plan of Treatment: On CT abdomen and pelvis, patient was found to have a sigmoid colonic mass around 9cm in diameter with multiple areas of metastasis in liver.  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 Haematologist/ oncologist was involved in care. They recommended palliative care. Depending on final report of immunohistological staining, hematologist will decide about chemotherapy options. Surgical oncologist stated that tumor is unresectable.  GI was also involved in care. they did sigmoidoscopy but it failed to get enough sample for biopsy. Per hematology and oncology we only need liver biopsy to final report to start treatment.      SECONDARY DISCHARGE DIAGNOSES  Diagnosis: Sepsis  Assessment and Plan of Treatment: For sepsis, patient blood cultures were taken and started on IV antibiotics. Blood culture gram negative rods bacteroids. We switched antibiotic to zosyn. ID consult was obtained and patient is started on IV zosyn. patient repeat blood cultures are negative. per infectious disease recommendation, patient can be switch to oral flagyl and Ceftin for 2 more days.    Diagnosis: Hypothyroidism  Assessment and Plan of Treatment: Pt was admitted with concern for sepsis and severe hypothyroidism.   Pt was started on IV fluids and given IV hydrocortisone 50 mg once and IV synthroid 50 mcg once in ED and then started on started on home dose from.    Diagnosis: Pressure injury of sacral region, stage 2  Assessment and Plan of Treatment: continue wound care of stage 2 sacral ulcer.    Diagnosis: Failure to thrive in adult  Assessment and Plan of Treatment:     Diagnosis: Malignant neoplasm of sigmoid colon  Assessment and Plan of Treatment: Malignant neoplasm of sigmoid colon    Diagnosis: Malignant neoplasm of sigmoid colon  Assessment and Plan of Treatment: Malignant neoplasm of sigmoid colon PRINCIPAL DISCHARGE DIAGNOSIS  Diagnosis: Malignant neoplasm of sigmoid colon  Assessment and Plan of Treatment: On CT abdomen and pelvis, patient was found to have a sigmoid colonic mass around 9cm in diameter with multiple areas of metastasis in liver.  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 Haematologist/ oncologist was involved in care. They recommended palliative care. Depending on final report of immunohistological staining, hematologist will decide about chemotherapy options. Surgical oncologist stated that tumor is unresectable.  GI was also involved in care. they did sigmoidoscopy but it failed to get enough sample for biopsy. Per hematology and oncology we only need liver biopsy to final report to start treatment.      SECONDARY DISCHARGE DIAGNOSES  Diagnosis: Sepsis  Assessment and Plan of Treatment: For sepsis, patient blood cultures were taken and started on IV antibiotics. Blood culture gram negative rods bacteroids. We switched antibiotic to zosyn. ID consult was obtained and patient is started on IV zosyn. patient repeat blood cultures are negative. per infectious disease recommendation, patient can be switch to oral flagyl and Ceftin for 2 more days.    Diagnosis: Hypothyroidism  Assessment and Plan of Treatment: Pt was admitted with concern for sepsis and severe hypothyroidism.   Pt was started on IV fluids and given IV hydrocortisone 50 mg once and IV synthroid 50 mcg once in ED and then started on started on home dose from.    Diagnosis: Pressure injury of sacral region, stage 2  Assessment and Plan of Treatment: continue wound care of stage 2 sacral ulcer.    Diagnosis: Failure to thrive in adult  Assessment and Plan of Treatment: encourage oral intake.    Diagnosis: Malignant neoplasm of sigmoid colon  Assessment and Plan of Treatment: Malignant neoplasm of sigmoid colon    Diagnosis: Malignant neoplasm of sigmoid colon  Assessment and Plan of Treatment: Malignant neoplasm of sigmoid colon PRINCIPAL DISCHARGE DIAGNOSIS  Diagnosis: Malignant neoplasm of sigmoid colon  Assessment and Plan of Treatment: On CT abdomen and pelvis, patient was found to have a sigmoid colonic mass around 9cm in diameter with multiple areas of metastasis in liver.  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 Haematologist/ oncologist was involved in care. They recommended palliative care. Depending on final report of immunohistological staining, hematologist will decide about chemotherapy options. Surgical oncologist stated that tumor is unresectable.  GI was also involved in care. they did sigmoidoscopy but it failed to get enough sample for biopsy. Per hematology and oncology we only need liver biopsy to final report to start treatment. Family currently requesting [facility] placement with likely discharge to home on hospice with return home.      SECONDARY DISCHARGE DIAGNOSES  Diagnosis: Sepsis  Assessment and Plan of Treatment: For sepsis, patient blood cultures were taken and started on IV antibiotics. Blood culture gram negative rods bacteroids. We switched antibiotic to zosyn. ID consult was obtained and patient is started on IV zosyn. patient repeat blood cultures are negative. per infectious disease recommendation, patient can be switch to oral flagyl and Ceftin for 2 more days.    Diagnosis: Bacteremia  Assessment and Plan of Treatment: Patient found to initially have bacteroides bacteremia in 4/4 bottles from admission sample. Per input from the infectious disease service, this was felt most likely secondary to colonic tumor (with mucosal disruption). Pt treated with zosyn during hospital course and transitioned to oral regimen for discharge. Repeat blood cultures were negative.    Diagnosis: Hypothyroidism  Assessment and Plan of Treatment: Pt was admitted with concern for sepsis and severe hypothyroidism.   Pt was started on IV fluids and given IV hydrocortisone 50 mg once and IV synthroid 50 mcg once in ED and then started on started on home dose from.    Diagnosis: Parkinsons disease  Assessment and Plan of Treatment: Continue with outpatient course of sinemet. Gait training and fall precautions.    Diagnosis: Muscular deconditioning  Assessment and Plan of Treatment: Patient noted with falls/ ambulatory dysfunction on admission with noted deteriorated functional status in setting of patient with underlying Parkinson disease    Diagnosis: Pressure injury of sacral region, stage 2  Assessment and Plan of Treatment: continue wound care of stage 2 sacral ulcer.    Diagnosis: Failure to thrive in adult  Assessment and Plan of Treatment: encourage oral intake.

## 2019-03-19 NOTE — CONSULT NOTE ADULT - REASON FOR ADMISSION
weakness, lethargy
Sepsis
weakness, lethargy

## 2019-03-19 NOTE — CONSULT NOTE ADULT - ASSESSMENT
82 yo M with HTN, HLD, HFpEF who presented with weakness in the setting of bacteremia, noted to have sigmoid mass with metastases and new-onset atrial fibrillation.     1. Atrial fibrillation: Appears rate controlled on metoprolol 12.5mg Q12H; patient is not on telemetry but per regular VS checking the HR appears at target (<110 bpm per RACE II criteria)  -CHADS2-VASc score is 3 (HTN, age +2), consistent with ~3% annual risk of stroke if off systemic anticoagulation. Agree with Eliquis 5mg PO Q12H (Creatinine < 1.5, weigh > 60 kg)  -If patient develops any bleeding in setting of NOAC can hold off and re-evaluate need for anticoagulation at that time.     ***Note that this is a preliminary note and any recommendations should NOT be carried out until this note is finalized. *** 84 yo M with HTN, HLD, HFpEF who presented with weakness in the setting of bacteremia, noted to have sigmoid mass with metastases and new-onset tachyarrhythmia (atrial fibrillation vs AT).     1. Atrial fibrillation: Appears rate controlled on metoprolol 12.5mg Q12H; patient is not on telemetry but per regular VS checking the HR appears at target (<110 bpm per RACE II criteria)  -CHADS2-VASc score is 3 (HTN, age +2), consistent with ~3% annual risk of stroke if off systemic anticoagulation. Agree with Eliquis 5mg PO Q12H (Creatinine < 1.5, weigh > 60 kg)  -If patient develops any bleeding in setting of NOAC can hold off and re-evaluate need for anticoagulation at that time.   -Please repeat 12-lead EKG      ***Note that this is a preliminary note and any recommendations should NOT be carried out until this note is finalized. *** 84 yo M with HTN, HLD, HFpEF who presented with weakness in the setting of bacteremia, noted to have sigmoid mass with metastases and new-onset tachyarrhythmia (AT).     1. Arrhythmia: Appears to beside of atrial tachycardia caught on 12-lead EKG.   -Patient refuses telemetry, can repeat one more EKG to confirm  -Thus far no compelling evidence of AF, though if it is seen patient's CHADS2-VASc score would be 3 (HTN, age +2), consistent with ~3% annual risk of stroke if off systemic anticoagulation, so Eliquis 5mg PO Q12H (Creatinine < 1.5, weigh > 60 kg) would be appropriate at that time.   -Continue beta blocker for medical management of AT    2. HTN: Patient is on atenolol at home, agree with continuing beta blocker to prevent rebound    ***Note that this is a preliminary note and any recommendations should NOT be carried out until this note is finalized. *** 84 yo M with HTN, HLD, HFpEF who presented with weakness in the setting of bacteremia, noted to have sigmoid mass with metastases and new-onset tachyarrhythmia (AT).     1. Arrhythmia: Appears to be onset of atrial tachycardia caught on 12-lead EKG.   -Patient refuses telemetry, can repeat one more EKG to confirm  -Thus far no compelling evidence of AF, though if it is seen in the future, patient's CHADS2-VASc score would be 3 (HTN, age +2), consistent with ~3% annual risk of stroke if off systemic anticoagulation, so Eliquis 5mg PO Q12H (Creatinine < 1.5, weigh > 60 kg) would be appropriate at that time.   -Continue beta blocker for medical management of AT    2. HTN: Patient is on atenolol at home, agree with continuing beta blocker to prevent rebound

## 2019-03-19 NOTE — DISCHARGE NOTE PROVIDER - HOSPITAL COURSE
HPI:    83M with PMH of HTN, HLD, hypothyroidism, CHF with preserved EF, Parkinson's disease, comes in by EMS after visiting phlebotomist called 911. Per chart, patient was noted to be very dehydrated, and very weak. Wife at bedside shares that patient has not been eating or drinking liquids for the past several days and has been having waxing/waning confusion for the past day. Patient has had decreased movement, however unable to bear weight. Pt has been able to whispering, however overall much more weak, has not taken medication for the past 2 days. ED physician on admission initiated goals of care however wife Initiated goals of care, but wife has not decided. Pt too sick to have discussion about goals of care at present, will defer until patient more improved.     Pt was admitted with concern for sepsis and severe hypothyroidism.     Pt was started on IV fluids and given IV hydrocortisone 50 mg once and IV synthroid 50 mcg once in ED and then started on started on home dose from next day.    For sepsis, patient blood cultures were taken and started on IV antibiotics. Blood culture gram negative rods bacteroids. We switched antibiotic to zosyn. ID consult was obtained and patient is started on IV zosyn. patient repeat blood cultures are negative. per infectious disease recommendation, patient can be switch to oral flagyl and Ceftin for 2 more days.    On CT abdomen and pelvis, patient was found to have a sigmoid colonic mass around 9cm in diameter with multiple areas of metastasis in liver.    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 Haematologist/ oncologist was involved in care. They recommended palliative care. Depending on final report of immunohistological staining, hematologist will decide about chemotherapy options. Surgical oncologist stated that tumor is unresectable.    GI was also involved in care. they did sigmoidoscopy but it failed to get enough sample for biopsy. Per hematology and oncology we only need liver biopsy to final report for treatment planing.    Pt also had questionable atrial fibrillation on 03/18/19. Cardiology was consulted and they thinks it was atrial tachycardia. patient is on beta blocker. No anticoagulation is required.    PT recommended discharge to subacute rehab.

## 2019-03-20 ENCOUNTER — OTHER (OUTPATIENT)
Age: 84
End: 2019-03-20

## 2019-05-14 ENCOUNTER — APPOINTMENT (OUTPATIENT)
Dept: UROLOGY | Facility: CLINIC | Age: 84
End: 2019-05-14

## 2019-10-09 NOTE — CONSULT NOTE ADULT - ASSESSMENT
SELINA REYES presents today for yearly complete physical exam with complaint of dysuria, some hesitancy.    ROS: 12-point review of systems is negative    Past Medical History:   Diagnosis Date   • Essential (primary) hypertension    • FH: colon cancer    • GERD (gastroesophageal reflux disease)    • Kidney stones    • Urethral stricture      Past Surgical History:   Procedure Laterality Date   • Back surgery  10/2018    Herniated Disc   • Colonoscopy  2019    repeat 5 years/polyp   • Hand tendon surgery      right thumb   • Knee surgery      Right Arthroscopic   • Tonsillectomy and adenoidectomy  childhood   • Undescended testicle exploration     • Urethrotomy      stable urinary symptoms since     @MEDSINGROUPER(<SUBSCRIPT> error)@  ALLERGIES:   Allergen Reactions   • Codeine HIVES   • Penicillins HIVES     Family History   Problem Relation Age of Onset   • Diabetes Mother    • Cancer Father    • Heart disease Father    • Heart disease Brother      Social History     Socioeconomic History   • Marital status: /Civil Union     Spouse name: Not on file   • Number of children: Not on file   • Years of education: Not on file   • Highest education level: Not on file   Occupational History   • Not on file   Social Needs   • Financial resource strain: Not on file   • Food insecurity:     Worry: Not on file     Inability: Not on file   • Transportation needs:     Medical: Not on file     Non-medical: Not on file   Tobacco Use   • Smoking status: Former Smoker     Packs/day: 1.00     Years: 20.00     Pack years: 20.00     Types: Cigarettes     Last attempt to quit: 2005     Years since quittin.7   • Smokeless tobacco: Current User     Types: Chew   Substance and Sexual Activity   • Alcohol use: Yes     Comment: social   • Drug use: No   • Sexual activity: Not on file   Lifestyle   • Physical activity:     Days per week: Not on file     Minutes per session: Not on file   • Stress: Not  on file   Relationships   • Social connections:     Talks on phone: Not on file     Gets together: Not on file     Attends [employer] service: Not on file     Active member of club or organization: Not on file     Attends meetings of clubs or organizations: Not on file     Relationship status: Not on file   • Intimate partner violence:     Fear of current or ex partner: Not on file     Emotionally abused: Not on file     Physically abused: Not on file     Forced sexual activity: Not on file   Other Topics Concern   • Not on file   Social History Narrative     to [place]; lives in [place]; retail (flowers/produce); tobacco:  Quit 2005, 20 pack-year; still chews; alcohol:  Occasional; exercise:  No regular; 1 son       PHYSICAL EXAM  Visit Vitals  /78   Pulse 84   Temp 97.7 °F (36.5 °C)   Wt 111.5 kg   BMI 38.50 kg/m²     HEENT:  PERRLA (pupils equal, round and reactive to light and accommodation), EOMI (extraocular movements intact). The external auditory canals and tympanic membranes are normal bilaterally. Nares are clear. The oral mucosa is moist and without lesions. The pharynx is clear.  Neck:  No masses, thyromegaly, adenopathy or carotid bruit  Heart:  Regular rate and rhythm. Normal S1 and S2 without S3 or S4. No murmur, rub or click.  Lungs:  Clear to auscultation. No rales, rhonchi or wheezes.  Abdomen:  Soft, nondistended, nontender, bowel sounds are active. No hepatomegaly or splenomegaly. No masses.  Back:  Normal thoracic kyphosis and lumbar lordosis. The spine is nontender. No costovertebral angle tenderness.  Skin:  No rash or unusual nevi.  Extremities:  Normal strength and tone. No joint pain on range-of-motion. No peripheral edema.  Neurologic:  Alert and oriented x 3. Cranial nerves II through XII are intact. Motor strength is 5+ throughout all groups. Sensation is normal to light touch and monofilament. Gait and station are normal. DTR's (deep tendon reflexes) are 2+  throughout.    IMPRESSION  Encounter Diagnoses   Name Primary?   • Physical exam, routine Yes   • Lipid screening    • Benign essential HTN    • Dysuria    • Essential hypertension      Reinforced importance of yearly exam, weight loss, diet, exercise, HM lab as well as UA pending.    Next visit in 1 year, follow up sooner as needed    ADDENDUM  UA noted; for UTI start Levaquin x 10d and see me in 2-4 weeks or sooner prn   Very pleasant 83 year old gentleman with BPH  -Continue Flomax  -CT images reviewed  -No urologic intervention at this time  -Will have patient follow up as an outpatient for further urologic management  - Follow up with Dr. Bell after discharge by calling [phone] or [phone] to schedule an appointment.   [address] [facility]. [address]  [place] [address]

## 2020-02-21 NOTE — DIETITIAN INITIAL EVALUATION ADULT. - +GENDER
[facility] states pt needs to re-qualify for CPAP as 2/19/2020 notes report he is not using the CPAP machine.    Last sleep study was in 2015.   Can be home sleep study.    (Office visit note states pt willing to restart on CPAP, and that he lost the machine during a move. )    Order placed for new home sleep study for physician review and signature.  
Statement Selected

## 2023-03-10 NOTE — PROGRESS NOTE ADULT - SUBJECTIVE AND OBJECTIVE BOX
Final Anesthesia Post-op Assessment    Patient: Balbir Ryder Jr.  Procedure(s) Performed: RIGHT BUNIONECTOMY WITH OSTEOTOMY - RIGHT  Anesthesia type: MAC    Vitals Value Taken Time   Temp 36.7 °C (98.1 °F) 03/10/23 0925   Pulse 67 03/10/23 0925   Resp 16 03/10/23 0925   SpO2 97 % 03/10/23 0925   /71 03/10/23 0925         Patient Location: Phase II  Post-op Vital Signs:stable  Level of Consciousness: participates in exam, answers questions appropriately, awake, alert, oriented and return to baseline  Respiratory Status: spontaneous ventilation and room air  Cardiovascular stable  Hydration: euvolemic  Pain Management: well controlled  Vomiting: none  Nausea: None  Airway Patency:patent  Post-op Assessment: awake, alert, appropriately conversant, or baseline, no complications, patient tolerated procedure well with no complications and no evidence of recall  Comments: Doing well.      No notable events documented.    PGY 1 Note discussed with supervising resident and primary attending    Patient is a 83y old  Male who presents with a chief complaint of weakness, lethargy (08 Mar 2019 10:20)      INTERVAL HPI/OVERNIGHT EVENTS: no acute overnight events,     MEDICATIONS  (STANDING):  artificial  tears Solution 1 Drop(s) Left EYE three times a day  carbidopa/levodopa  10/100 1 Tablet(s) Oral <User Schedule>  cholecalciferol 1000 Unit(s) Oral daily  dextrose 5% + sodium chloride 0.9%. 1000 milliLiter(s) (75 mL/Hr) IV Continuous <Continuous>  enoxaparin Injectable 40 milliGRAM(s) SubCutaneous daily  erythromycin   Ointment 1 Application(s) Right EYE two times a day  insulin lispro (HumaLOG) corrective regimen sliding scale   SubCutaneous three times a day before meals  levothyroxine 100 MICROGram(s) Oral daily  piperacillin/tazobactam IVPB. 3.375 Gram(s) IV Intermittent every 8 hours  potassium phosphate IVPB 15 milliMole(s) IV Intermittent once  tamsulosin 0.4 milliGRAM(s) Oral at bedtime    MEDICATIONS  (PRN):  acetaminophen   Tablet .. 650 milliGRAM(s) Oral every 6 hours PRN Temp greater or equal to 38C (100.4F)      __________________________________________________  REVIEW OF SYSTEMS:    CONSTITUTIONAL: No fever,   EYES: no acute visual disturbances  NECK: No pain or stiffness  RESPIRATORY: No cough; No shortness of breath  CARDIOVASCULAR: No chest pain, no palpitations  GASTROINTESTINAL: No pain. No nausea or vomiting; No diarrhea   NEUROLOGICAL: No headache or numbness, no tremors  MUSCULOSKELETAL: No joint pain, no muscle pain  GENITOURINARY: no dysuria, no frequency, no hesitancy  PSYCHIATRY: no depression , no anxiety  ALL OTHER  ROS negative        Vital Signs Last 24 Hrs  T(C): 36.3 (08 Mar 2019 07:36), Max: 38.2 (08 Mar 2019 00:42)  T(F): 97.4 (08 Mar 2019 07:36), Max: 100.8 (08 Mar 2019 00:42)  HR: 65 (08 Mar 2019 07:36) (60 - 69)  BP: 106/51 (08 Mar 2019 07:36) (105/55 - 108/53)  BP(mean): --  RR: 16 (08 Mar 2019 07:36) (16 - 16)  SpO2: 100% (08 Mar 2019 07:36) (97% - 100%)    ________________________________________________  PHYSICAL EXAM:  GENERAL: NAD  HEENT: Normocephalic;  conjunctivae and sclerae clear; moist mucous membranes;   NECK : supple  CHEST/LUNG: Clear to auscultation bilaterally with good air entry   HEART: S1 S2  regular; no murmurs, gallops or rubs  ABDOMEN: Soft, Nontender, Nondistended; Bowel sounds present  EXTREMITIES: no cyanosis; no edema; no calf tenderness  SKIN: warm and dry; no rash  NERVOUS SYSTEM:  Awake and alert; Oriented  to place, person and time ; no new deficits    _________________________________________________  LABS:                        8.8    11.82 )-----------( 167      ( 08 Mar 2019 06:16 )             27.6     03-08    140  |  110<H>  |  26<H>  ----------------------------<  167<H>  3.5   |  24  |  0.71    Ca    8.3<L>      08 Mar 2019 06:16  Phos  1.6     03-08  Mg     2.3     03-08    TPro  6.3  /  Alb  2.1<L>  /  TBili  1.2  /  DBili  x   /  AST  49<H>  /  ALT  47  /  AlkPhos  277<H>  03-06    PT/INR - ( 08 Mar 2019 06:16 )   PT: 14.3 sec;   INR: 1.28 ratio           Urinalysis Basic - ( 06 Mar 2019 16:48 )    Color: Yellow / Appearance: Clear / S.010 / pH: x  Gluc: x / Ketone: Small  / Bili: Negative / Urobili: Negative   Blood: x / Protein: 30 mg/dL / Nitrite: Negative   Leuk Esterase: Negative / RBC: 10-25 /HPF / WBC 0-2 /HPF   Sq Epi: x / Non Sq Epi: Few /HPF / Bacteria: Few /HPF      CAPILLARY BLOOD GLUCOSE      POCT Blood Glucose.: 165 mg/dL (08 Mar 2019 08:15)        RADIOLOGY & ADDITIONAL TESTS:    Imaging Personally Reviewed:  YES/NO    Consultant(s) Notes Reviewed:   YES/ No    Care Discussed with Consultants :     Plan of care was discussed with patient and /or primary care giver; all questions and concerns were addressed and care was aligned with patient's wishes. PGY 1 Note discussed with supervising resident and primary attending    Patient is a 83y old  Male who presents with a chief complaint of weakness, lethargy (08 Mar 2019 10:20)      INTERVAL HPI/OVERNIGHT EVENTS: no acute overnight events, says he feels better and offers no complaints, had normal bowel movement, febrile overnight Tmax 100.8, leukocytosis trending down, awaiting final blood culture    MEDICATIONS  (STANDING):  artificial  tears Solution 1 Drop(s) Left EYE three times a day  carbidopa/levodopa  10/100 1 Tablet(s) Oral <User Schedule>  cholecalciferol 1000 Unit(s) Oral daily  dextrose 5% + sodium chloride 0.9%. 1000 milliLiter(s) (75 mL/Hr) IV Continuous <Continuous>  enoxaparin Injectable 40 milliGRAM(s) SubCutaneous daily  erythromycin   Ointment 1 Application(s) Right EYE two times a day  insulin lispro (HumaLOG) corrective regimen sliding scale   SubCutaneous three times a day before meals  levothyroxine 100 MICROGram(s) Oral daily  piperacillin/tazobactam IVPB. 3.375 Gram(s) IV Intermittent every 8 hours  potassium phosphate IVPB 15 milliMole(s) IV Intermittent once  tamsulosin 0.4 milliGRAM(s) Oral at bedtime    MEDICATIONS  (PRN):  acetaminophen   Tablet .. 650 milliGRAM(s) Oral every 6 hours PRN Temp greater or equal to 38C (100.4F)      __________________________________________________  REVIEW OF SYSTEMS:    CONSTITUTIONAL: No fever,   EYES: no acute visual disturbances  NECK: No pain or stiffness  RESPIRATORY: No cough; No shortness of breath  CARDIOVASCULAR: No chest pain, no palpitations  GASTROINTESTINAL: No pain. No nausea or vomiting; No diarrhea   NEUROLOGICAL: No headache or numbness, no tremors  MUSCULOSKELETAL: No joint pain, no muscle pain  GENITOURINARY: no dysuria, no frequency, no hesitancy  PSYCHIATRY: no depression , no anxiety  ALL OTHER  ROS negative        Vital Signs Last 24 Hrs  T(C): 36.3 (08 Mar 2019 07:36), Max: 38.2 (08 Mar 2019 00:42)  T(F): 97.4 (08 Mar 2019 07:36), Max: 100.8 (08 Mar 2019 00:42)  HR: 65 (08 Mar 2019 07:36) (60 - 69)  BP: 106/51 (08 Mar 2019 07:36) (105/55 - 108/53)  BP(mean): --  RR: 16 (08 Mar 2019 07:36) (16 - 16)  SpO2: 100% (08 Mar 2019 07:36) (97% - 100%)    ________________________________________________  PHYSICAL EXAM:  GENERAL: NAD, soft spoken  HEENT: Normocephalic;  conjunctivae and sclerae clear; moist mucous membranes;   NECK : supple  CHEST/LUNG: Clear to auscultation bilaterally with good air entry   HEART: S1 S2  regular; no murmurs, gallops or rubs  ABDOMEN: Soft, Nontender, mass in Rt. lower quadrant, Nondistended; Bowel sounds present  EXTREMITIES: no cyanosis; no edema; no calf tenderness  SKIN: warm and dry; no rash  NERVOUS SYSTEM:  Awake and alert; Oriented  to place, person and time ; no new deficits    _________________________________________________  LABS:                        8.8    11.82 )-----------( 167      ( 08 Mar 2019 06:16 )             27.6     03-08    140  |  110<H>  |  26<H>  ----------------------------<  167<H>  3.5   |  24  |  0.71    Ca    8.3<L>      08 Mar 2019 06:16  Phos  1.6     03-08  Mg     2.3     03-08    TPro  6.3  /  Alb  2.1<L>  /  TBili  1.2  /  DBili  x   /  AST  49<H>  /  ALT  47  /  AlkPhos  277<H>  03-06    PT/INR - ( 08 Mar 2019 06:16 )   PT: 14.3 sec;   INR: 1.28 ratio           Urinalysis Basic - ( 06 Mar 2019 16:48 )    Color: Yellow / Appearance: Clear / S.010 / pH: x  Gluc: x / Ketone: Small  / Bili: Negative / Urobili: Negative   Blood: x / Protein: 30 mg/dL / Nitrite: Negative   Leuk Esterase: Negative / RBC: 10-25 /HPF / WBC 0-2 /HPF   Sq Epi: x / Non Sq Epi: Few /HPF / Bacteria: Few /HPF      CAPILLARY BLOOD GLUCOSE      POCT Blood Glucose.: 165 mg/dL (08 Mar 2019 08:15)        RADIOLOGY & ADDITIONAL TESTS:    < from: CT Abdomen and Pelvis w/ IV Cont (19 @ 16:07) >  IMPRESSION:    Multiple liver lesions consistent with metastases    large mass in the lower abdomen/upper pelvis surrounding the sigmoid   colon which may be secondary to just tumor, lymphoma or adenocarcinoma.   There is no bowel obstruction.        Imaging Personally Reviewed:  YES    Consultant(s) Notes Reviewed:   YES    Care Discussed with Consultants : YES    Plan of care was discussed with patient and /or primary care giver; all questions and concerns were addressed and care was aligned with patient's wishes.

## 2023-04-19 NOTE — ED PROVIDER NOTE - PROGRESS NOTE DETAILS
soft/nondistended/nontender possible adrenal/endocrine issue. sent levels. wbc of 11.8. cxr no pna. ua pending. will empirically cover.

## 2023-10-20 NOTE — CONSULT NOTE ADULT - CONSULT REASON
colonic mass Cheek-To-Nose Interpolation Flap Text: A decision was made to reconstruct the defect utilizing an interpolation axial flap and a staged reconstruction.  A telfa template was made of the defect.  This telfa template was then used to outline the Cheek-To-Nose Interpolation flap.  The donor area for the pedicle flap was then injected with anesthesia.  The flap was excised through the skin and subcutaneous tissue down to the layer of the underlying musculature.  The interpolation flap was carefully excised within this deep plane to maintain its blood supply.  The edges of the donor site were undermined.   The donor site was closed in a primary fashion.  The pedicle was then rotated into position and sutured.  Once the tube was sutured into place, adequate blood supply was confirmed with blanching and refill.  The pedicle was then wrapped with xeroform gauze and dressed appropriately with a telfa and gauze bandage to ensure continued blood supply and protect the attached pedicle.

## 2024-02-23 NOTE — DISCHARGE NOTE PROVIDER - NSDCQMSTAIRS_GEN_ALL_CORE
Yes Bed/Stretcher in lowest position, wheels locked, appropriate side rails in place/Call bell, personal items and telephone in reach/Instruct patient to call for assistance before getting out of bed/chair/stretcher/Non-slip footwear applied when patient is off stretcher/[place] to call system/Physically safe environment - no spills, clutter or unnecessary equipment/Purposeful proactive rounding/Room/bathroom lighting operational, light cord in reach

## 2024-04-06 NOTE — PHYSICAL THERAPY INITIAL EVALUATION ADULT - FOLLOWS COMMANDS/ANSWERS QUESTIONS, REHAB EVAL
Please let mom know that the radiologist felt there might be questionable small toe fractures of the 3rd and 4th toes, otherwise the XR was negative. I would encouraged activity as tolerated, ice, and elevation as discussed in the office. 
Referral placed for Dr. Jason in [place] if they choose to pursue that.
able to follow single-step instructions